# Patient Record
Sex: FEMALE | Race: WHITE | Employment: UNEMPLOYED | URBAN - METROPOLITAN AREA
[De-identification: names, ages, dates, MRNs, and addresses within clinical notes are randomized per-mention and may not be internally consistent; named-entity substitution may affect disease eponyms.]

---

## 2017-03-03 ENCOUNTER — OFFICE VISIT (OUTPATIENT)
Dept: SURGERY | Age: 47
End: 2017-03-03

## 2017-03-03 VITALS
HEIGHT: 58 IN | HEART RATE: 77 BPM | SYSTOLIC BLOOD PRESSURE: 130 MMHG | WEIGHT: 93 LBS | DIASTOLIC BLOOD PRESSURE: 72 MMHG | BODY MASS INDEX: 19.52 KG/M2

## 2017-03-03 DIAGNOSIS — N64.52 BLOODY DISCHARGE FROM LEFT NIPPLE: Primary | ICD-10-CM

## 2017-03-03 DIAGNOSIS — C50.412 MALIGNANT NEOPLASM OF UPPER-OUTER QUADRANT OF LEFT FEMALE BREAST (HCC): ICD-10-CM

## 2017-03-03 NOTE — MR AVS SNAPSHOT
Visit Information Date & Time Provider Department Dept. Phone Encounter #  
 3/3/2017 09:81 AM Ariadna Valdez MD [address] at [address] [place] [address] [id number] Follow-up Instructions Return in about 3 months (around 6/3/2017). Follow-up and Disposition History Your Appointments 6/2/2017  8:30 AM  
Follow Up with MD [address] at [facility] Appt Note: 3 month follow up/ cp$ 3-3-17 LS  
 [address] [facility] [address]  
  
   
 [address] Upcoming Health Maintenance Date Due Pneumococcal 19-64 Highest Risk (1 of 3 - PCV13) 7/9/1991 DTaP/Tdap/Td series (1 - Tdap) 7/9/1993 PAP AKA CERVICAL CYTOLOGY 7/9/1993 INFLUENZA AGE 9 TO ADULT 8/1/2016 Allergies as of 3/3/2017  Review Complete On: 0/4/3675 By: Ariadna Valdez MD  
 No Known Allergies Current Immunizations  Never Reviewed No immunizations on file. Not reviewed this visit You Were Diagnosed With   
  
 Codes Comments Bloody discharge from left nipple    -  Primary ICD-10-CM: N64.52 
ICD-9-CM: 611.79 Malignant neoplasm of upper-outer quadrant of left female breast ([facility] [address]     ICD-10-CM: N96.467 ICD-9-CM: 174.4 Vitals BP  
  
  
  
  
  
 130/72 (BP 1 Location: Right arm, BP Patient Position: Sitting) BMI and BSA Data Body Mass Index Body Surface Area  
 19.44 kg/m 2 1.31 m 2 Preferred Pharmacy Pharmacy Name Phone [id number] [address] [phone] Your Updated Medication List  
  
   
This list is accurate as of: 3/3/17  1:20 PM.  Always use your most recent med list.  
  
  
  
  
 acetaminophen 325 mg tablet Commonly known as:  TYLENOL Take  by mouth every four (4) hours as needed for Pain. hydrALAZINE 100 mg tablet Commonly known as:  APRESOLINE Take 100 mg by mouth. LUTEIN PO Take  by mouth. omega-3 fatty acids-vitamin e 1,000 mg Cap Take 1 Cap by mouth. PROBIOTIC 4X 10-15 mg Tbec Generic drug:  B.infantis-B.ani-B.long-B.bifi Take  by mouth. Follow-up Instructions Return in about 3 months (around 6/3/2017). Please provide this summary of care documentation to your next provider. Your primary care clinician is listed as Balwinder Banegas. If you have any questions after today's visit, please call [phone].

## 2017-03-03 NOTE — COMMUNICATION BODY
HISTORY OF PRESENT ILLNESS  Dagoberto Ramos is a 42 y.o. female. HPI  ESTABLISHED patient LEFT breast mastectomy 12/15/16. She complains of LEFT breast bloody nipple discharge since 3/2/17. Patient states the breast feels more \"tight\" than usual.     09/03/2015: LEFT lumpectomy of 0.3 cm, gr1-2 IDC with gr2 DCIS. ER+95%/MS+30% Her2- (DCIS: ER+95%/MS+5%). Positive margins. pT1a pNx Mx     10/07/2015: LEFT core bx of gr2 IDC with gr2-3 DCIS. ER+100%/MS+50% Her2- (DCIS: ER+100%/MS+20%)     Mammaprint: High risk luminal B     12/01/2015: LEFT SNBx with nipple bx. 0/6 LN involved. Benign breast tissue.     12/15/2015: LEFT mastectomy for 0.4 cm, gr2 IDC with gr3 DCIS. Clear margins. Pt had right breast implant also.     BRCA 2 gene NBN +     Pt refused Tamoxifen. Has yet to f/u with a medical oncologist since last visit with Dr. Lara Ribera on 01/04/2016    Past Medical History:   Diagnosis Date    Breast cancer ([facility] [address]     LT    Cancer ([facility] [address] 9/2015    LEFT breast        Past Surgical History:   Procedure Laterality Date    HX BREAST LUMPECTOMY Left 9/3/2015    LEFT BREAST DUCTAL EXCISON performed by Mirna Solitario MD at [address] HX HEENT Left 1998 x 3    Retinal detachment    HX MASTECTOMY Bilateral 12/15/2015    LEFT BREAST TOTAL SKIN AND NIPPLE SPARING MASTECTOMY, INSERTION LEFT BREAST TISSUE EXPANDER WITH ALLOGRAFT PLACEMENT, INSERTION RIGHT BREAST IMPLANT performed by Mirna Solitario MD at [facility]       Social History     Social History    Marital status:      Spouse name: N/A    Number of children: N/A    Years of education: N/A     Occupational History    Not on file.      Social History Main Topics    Smoking status: Never Smoker    Smokeless tobacco: Never Used    Alcohol use 1.2 oz/week     2 Glasses of wine per week    Drug use: Not on file    Sexual activity: Not on file     Other Topics Concern    Not on file     Social History Narrative       Current Outpatient Prescriptions on File Prior to Visit   Medication Sig Dispense Refill    B.infantis-B.ani-B.long-B.bifi (PROBIOTIC 4X) 10-15 mg TbEC Take  by mouth.  LUTEIN PO Take  by mouth.  acetaminophen (TYLENOL) 325 mg tablet Take  by mouth every four (4) hours as needed for Pain.  omega-3 fatty acids-vitamin e 1,000 mg cap Take 1 Cap by mouth.  hydrALAZINE (APRESOLINE) 100 mg tablet Take 100 mg by mouth. No current facility-administered medications on file prior to visit. No Known Allergies    OB History     Obstetric Comments    Menarche:  8. LMP: 06/02/2015. # of Children:  0. Age at Delivery of First Child:  n/a. Hysterectomy/oophorectomy:  NO/NO. Breast Bx:  no.  Hx of Breast Feeding:  no. BCP:  yes. Hormone therapy:  no.             ROS  Constitutional: Negative    HENT: Negative. Eyes: Negative. Respiratory: Negative. Cardiovascular: Negative. Gastrointestinal: Negative. Genitourinary: Negative. Musculoskeletal: Negative. Skin: Negative. Neurological: Negative. Endo/Heme/Allergies: Negative. Psychiatric/Behavioral: Negative. Physical Exam   Cardiovascular: Normal rate and normal heart sounds. Pulmonary/Chest: Breath sounds normal. Right breast exhibits no inverted nipple, no mass, no nipple discharge, no skin change and no tenderness. Left breast exhibits nipple discharge. Left breast exhibits no inverted nipple, no mass, no skin change and no tenderness. Breasts are symmetrical.       Lymphadenopathy:        Right cervical: No superficial cervical, no deep cervical and no posterior cervical adenopathy present. Left cervical: No superficial cervical, no deep cervical and no posterior cervical adenopathy present. Right axillary: No pectoral and no lateral adenopathy present. Left axillary: No pectoral and no lateral adenopathy present. BREAST ULTRASOUND  Indication: Left  breast discharge nipple  Technique:   The area was scanned using a high-frequency linear-array near-field transducer  Findings: Hypoechoic area lateral to nipple  Impression: Possible papilloma  Disposition: Discussed option including observation and excision. Pt elected for observation. ASSESSMENT and PLAN    ICD-10-CM ICD-9-CM    1. Bloody discharge from left nipple N64.52 611.79    2. Malignant neoplasm of upper-outer quadrant of left female breast ([facility] [address] C50.412 174.4       Pt presents with LT bloody nipple discharge, possibly related to intraductal papilloma or post-op changes. Last MRI looks good. Discussed options including observation and excisional biopsy. Pt elected for observation. F/u in 2-3 months. This plan was reviewed with the patient and patient agrees. All questions were answered.     Written by Wojciech Ward, as dictated by Dr. Love Temple MD.

## 2017-03-03 NOTE — PROGRESS NOTES
HISTORY OF PRESENT ILLNESS  Karla Chauhan is a 42 y.o. female. HPI   ESTABLISHED patient LEFT breast mastectomy 12/15/16. She complains of LEFT breast bloody nipple discharge since 3/2/17. Patient states the breast feels more \"tight\" than usual.   09/03/2015: LEFT lumpectomy of 0.3 cm, gr1-2 IDC with gr2 DCIS. ER+95%/MN+30% Her2- (DCIS: ER+95%/MN+5%). Positive margins. pT1a pNx Mx    10/07/2015: LEFT core bx of gr2 IDC with gr2-3 DCIS. ER+100%/MN+50% Her2- (DCIS: ER+100%/MN+20%)    Mammaprint: High risk luminal B    12/01/2015: LEFT SNBx with nipple bx. 0/6 LN involved. Benign breast tissue. 12/15/2015: LEFT mastectomy for 0.4 cm, gr2 IDC with gr3 DCIS. Clear margins. Pt had right breast implant also. BRCA 2 gene NBN +    Pt refused Tamoxifen.  Has yet to f/u with a medical oncologist since last visit with Dr. Tamara Rodas on 01/04/2016  ROS    Physical Exam    ASSESSMENT and PLAN  {ASSESSMENT/PLAN:[id number]}

## 2017-03-03 NOTE — PROGRESS NOTES
HISTORY OF PRESENT ILLNESS  Devaughn Servin is a 42 y.o. female. HPI  ESTABLISHED patient LEFT breast mastectomy 12/15/16. She complains of LEFT breast bloody nipple discharge since 3/2/17. Patient states the breast feels more \"tight\" than usual.     09/03/2015: LEFT lumpectomy of 0.3 cm, gr1-2 IDC with gr2 DCIS. ER+95%/OH+30% Her2- (DCIS: ER+95%/OH+5%). Positive margins. pT1a pNx Mx     10/07/2015: LEFT core bx of gr2 IDC with gr2-3 DCIS. ER+100%/OH+50% Her2- (DCIS: ER+100%/OH+20%)     Mammaprint: High risk luminal B     12/01/2015: LEFT SNBx with nipple bx. 0/6 LN involved. Benign breast tissue.     12/15/2015: LEFT mastectomy for 0.4 cm, gr2 IDC with gr3 DCIS. Clear margins. Pt had right breast implant also.     BRCA 2 gene NBN +     Pt refused Tamoxifen. Has yet to f/u with a medical oncologist since last visit with Dr. Sebastián Joseph on 01/04/2016    Past Medical History:   Diagnosis Date    Breast cancer ([facility] [address]     LT    Cancer ([facility] [address] 9/2015    LEFT breast        Past Surgical History:   Procedure Laterality Date    HX BREAST LUMPECTOMY Left 9/3/2015    LEFT BREAST DUCTAL EXCISON performed by Rogelio Matute MD at [address] [facility] HEENT Left 1998 x 3    Retinal detachment    HX MASTECTOMY Bilateral 12/15/2015    LEFT BREAST TOTAL SKIN AND NIPPLE SPARING MASTECTOMY, INSERTION LEFT BREAST TISSUE EXPANDER WITH ALLOGRAFT PLACEMENT, INSERTION RIGHT BREAST IMPLANT performed by Rogelio Matute MD at [address] [facility]       Social History     Social History    Marital status:      Spouse name: N/A    Number of children: N/A    Years of education: N/A     Occupational History    Not on file.      Social History Main Topics    Smoking status: Never Smoker    Smokeless tobacco: Never Used    Alcohol use 1.2 oz/week     2 Glasses of wine per week    Drug use: Not on file    Sexual activity: Not on file     Other Topics Concern    Not on file     Social History Narrative       Current Outpatient Prescriptions on File Prior to Visit   Medication Sig Dispense Refill    B.infantis-B.ani-B.long-B.bifi (PROBIOTIC 4X) 10-15 mg TbEC Take  by mouth.  LUTEIN PO Take  by mouth.  acetaminophen (TYLENOL) 325 mg tablet Take  by mouth every four (4) hours as needed for Pain.  omega-3 fatty acids-vitamin e 1,000 mg cap Take 1 Cap by mouth.  hydrALAZINE (APRESOLINE) 100 mg tablet Take 100 mg by mouth. No current facility-administered medications on file prior to visit. No Known Allergies    OB History     Obstetric Comments    Menarche:  8. LMP: 06/02/2015. # of Children:  0. Age at Delivery of First Child:  n/a. Hysterectomy/oophorectomy:  NO/NO. Breast Bx:  no.  Hx of Breast Feeding:  no. BCP:  yes. Hormone therapy:  no.             ROS  Constitutional: Negative    HENT: Negative. Eyes: Negative. Respiratory: Negative. Cardiovascular: Negative. Gastrointestinal: Negative. Genitourinary: Negative. Musculoskeletal: Negative. Skin: Negative. Neurological: Negative. Endo/Heme/Allergies: Negative. Psychiatric/Behavioral: Negative. Physical Exam   Cardiovascular: Normal rate and normal heart sounds. Pulmonary/Chest: Breath sounds normal. Right breast exhibits no inverted nipple, no mass, no nipple discharge, no skin change and no tenderness. Left breast exhibits nipple discharge. Left breast exhibits no inverted nipple, no mass, no skin change and no tenderness. Breasts are symmetrical.       Lymphadenopathy:        Right cervical: No superficial cervical, no deep cervical and no posterior cervical adenopathy present. Left cervical: No superficial cervical, no deep cervical and no posterior cervical adenopathy present. Right axillary: No pectoral and no lateral adenopathy present. Left axillary: No pectoral and no lateral adenopathy present. BREAST ULTRASOUND  Indication: Left  breast discharge nipple  Technique:   The area was scanned using a high-frequency linear-array near-field transducer  Findings: Hypoechoic area lateral to nipple  Impression: Possible papilloma  Disposition: Discussed option including observation and excision. Pt elected for observation. ASSESSMENT and PLAN    ICD-10-CM ICD-9-CM    1. Bloody discharge from left nipple N64.52 611.79    2. Malignant neoplasm of upper-outer quadrant of left female breast ([facility] [address] C50.412 174.4       Pt presents with LT bloody nipple discharge, possibly related to intraductal papilloma or post-op changes. Last MRI looks good. Discussed options including observation and excisional biopsy. Pt elected for observation. F/u in 2-3 months. This plan was reviewed with the patient and patient agrees. All questions were answered.     Written by Gurpreet Santoro, as dictated by Dr. Gerson Arredondo MD.

## 2017-06-09 ENCOUNTER — OFFICE VISIT (OUTPATIENT)
Dept: SURGERY | Age: 47
End: 2017-06-09

## 2017-06-09 VITALS
HEART RATE: 65 BPM | WEIGHT: 94 LBS | SYSTOLIC BLOOD PRESSURE: 123 MMHG | DIASTOLIC BLOOD PRESSURE: 61 MMHG | HEIGHT: 58 IN | BODY MASS INDEX: 19.73 KG/M2

## 2017-06-09 DIAGNOSIS — C50.412 MALIGNANT NEOPLASM OF UPPER-OUTER QUADRANT OF LEFT FEMALE BREAST (HCC): Primary | ICD-10-CM

## 2017-06-09 NOTE — PROGRESS NOTES
HISTORY OF PRESENT ILLNESS  Misha Mccracken is a 42 y.o. female. HPI  ESTABLISHED patient here for 3 month follow up for LEFT breast bloody nipple discharge. Patient has not noticed anymore discharge since last visit on 3/3/17. Not having any pain. Patient is having a procedure on June 30th for uterine polyps. Her doctor is recommending that she take progesterone, but the patient wants to talk to Dr. Salma Souza about this.     09/03/2015: LEFT lumpectomy of 0.3 cm, gr1-2 IDC with gr2 DCIS. ER+95%/UT+30% Her2- (DCIS: ER+95%/UT+5%). Positive margins. pT1a pNx Mx     10/07/2015: LEFT core bx of gr2 IDC with gr2-3 DCIS. ER+100%/UT+50% Her2- (DCIS: ER+100%/UT+20%)     Mammaprint: High risk luminal B     12/01/2015: LEFT SNBx with nipple bx. 0/6 LN involved. Benign breast tissue.     12/15/2015: LEFT mastectomy for 0.4 cm, gr2 IDC with gr3 DCIS. Clear margins. Pt had right breast implant also.     BRCA 2 gene NBN +     Pt refused Tamoxifen. Has yet to f/u with a medical oncologist since last visit with Dr. Sergio Feliz on 01/04/2016    Past Medical History:   Diagnosis Date    Breast cancer ([facility] [address]     LT    Cancer ([facility] [address] 9/2015    LEFT breast        Past Surgical History:   Procedure Laterality Date    HX BREAST LUMPECTOMY Left 9/3/2015    LEFT BREAST DUCTAL EXCISON performed by Ashia Gutierrez MD at [address] HX HEENT Left 1998 x 3    Retinal detachment    HX MASTECTOMY Bilateral 12/15/2015    LEFT BREAST TOTAL SKIN AND NIPPLE SPARING MASTECTOMY, INSERTION LEFT BREAST TISSUE EXPANDER WITH ALLOGRAFT PLACEMENT, INSERTION RIGHT BREAST IMPLANT performed by Ashia Gutierrez MD at [address] [facility]       Social History     Social History    Marital status:      Spouse name: N/A    Number of children: N/A    Years of education: N/A     Occupational History    Not on file.      Social History Main Topics    Smoking status: Never Smoker    Smokeless tobacco: Never Used    Alcohol use 1.2 oz/week     2 Glasses of wine per week    Drug use: Not on file    Sexual activity: Not on file     Other Topics Concern    Not on file     Social History Narrative       Current Outpatient Prescriptions on File Prior to Visit   Medication Sig Dispense Refill    B.infantis-B.ani-B.long-B.bifi (PROBIOTIC 4X) 10-15 mg TbEC Take  by mouth.  LUTEIN PO Take  by mouth.  acetaminophen (TYLENOL) 325 mg tablet Take  by mouth every four (4) hours as needed for Pain.  omega-3 fatty acids-vitamin e 1,000 mg cap Take 1 Cap by mouth. No current facility-administered medications on file prior to visit. No Known Allergies    OB History     Obstetric Comments    Menarche:  8. LMP: 06/02/2015. # of Children:  0. Age at Delivery of First Child:  n/a. Hysterectomy/oophorectomy:  NO/NO. Breast Bx:  no.  Hx of Breast Feeding:  no. BCP:  yes. Hormone therapy:  no.             ROS  Constitutional: Negative    HENT: Negative. Eyes: Negative. Respiratory: Negative. Cardiovascular: Negative. Gastrointestinal: Negative. Genitourinary: Negative. Musculoskeletal: Negative. Skin: Negative. Neurological: Negative. Endo/Heme/Allergies: Negative. Psychiatric/Behavioral: Negative. Physical Exam   Cardiovascular: Normal rate and normal heart sounds. Pulmonary/Chest: Breath sounds normal. Right breast exhibits no inverted nipple, no mass, no nipple discharge, no skin change and no tenderness. Left breast exhibits no inverted nipple, no mass, no nipple discharge, no skin change and no tenderness. Breasts are symmetrical.       Lymphadenopathy:        Right cervical: No superficial cervical, no deep cervical and no posterior cervical adenopathy present. Left cervical: No superficial cervical, no deep cervical and no posterior cervical adenopathy present. Right axillary: No pectoral and no lateral adenopathy present.         Left axillary: No pectoral and no lateral adenopathy present. ASSESSMENT and PLAN    ICD-10-CM ICD-9-CM    1. Malignant neoplasm of upper-outer quadrant of left female breast ([facility]) C50.412 174.4      Pt here to f/u on LT nipple discharge and doing well. Pt states discharge had resolved 1 week following last exam.     Discussed progesterone therapy she was recommended for recently dx uterine polyps. Advised that short term hormone therapy shouldn't increase risk of BC recurrence significantly, while long-term progesterone administration could be worrisome. F/u in 1 year. This plan was reviewed with the patient and patient agrees. All questions were answered.     Written by Elida Hernández, as dictated by Dr. Joe Suero MD.

## 2017-06-09 NOTE — PATIENT INSTRUCTIONS
Breast Self-Exam: Care Instructions  Your Care Instructions  A breast self-exam is when you check your breasts for lumps or changes. This regular exam helps you learn how your breasts normally look and feel. Most breast problems or changes are not because of cancer. Breast self-exam is not a substitute for a mammogram. Having regular breast exams by your doctor and regular mammograms improve your chances of finding any problems with your breasts. Some women set a time each month to do a step-by-step breast self-exam. Other women like a less formal system. They might look at their breasts as they brush their teeth, or feel their breasts once in a while in the shower. If you notice a change in your breast, tell your doctor. Follow-up care is a key part of your treatment and safety. Be sure to make and go to all appointments, and call your doctor if you are having problems. Its also a good idea to know your test results and keep a list of the medicines you take. How do you do a breast self-exam?  · The best time to examine your breasts is usually one week after your menstrual period begins. Your breasts should not be tender then. If you do not have periods, you might do your exam on a day of the month that is easy to remember. · To examine your breasts:  ¨ Remove all your clothes above the waist and lie down. When you are lying down, your breast tissue spreads evenly over your chest wall, which makes it easier to feel all your breast tissue. ¨ Use the pads--not the fingertips--of the 3 middle fingers of your left hand to check your right breast. Move your fingers slowly in small coin-sized circles that overlap. ¨ Use three levels of pressure to feel of all your breast tissue. Use light pressure to feel the tissue close to the skin surface. Use medium pressure to feel a little deeper. Use firm pressure to feel your tissue close to your breastbone and ribs.  Use each pressure level to feel your breast tissue before moving on to the next spot. ¨ Check your entire breast, moving up and down as if following a strip from the collarbone to the bra line, and from the armpit to the ribs. Repeat until you have covered the entire breast.  ¨ Repeat this procedure for your left breast, using the pads of the 3 middle fingers of your right hand. · To examine your breasts while in the shower:  ¨ Place one arm over your head and lightly soap your breast on that side. ¨ Using the pads of your fingers, gently move your hand over your breast (in the strip pattern described above), feeling carefully for any lumps or changes. ¨ Repeat for the other breast.  · Have your doctor inspect anything you notice to see if you need further testing. Where can you learn more? Go to http://rafaela-micah.info/. Enter P148 in the search box to learn more about \"Breast Self-Exam: Care Instructions. \"  Current as of: July 26, 2016  Content Version: 11.2  © 7603-5399 [employer]. Care instructions adapted under license by [employer] (which disclaims liability or warranty for this information). If you have questions about a medical condition or this instruction, always ask your healthcare professional. [place] [address] any warranty or liability for your use of this information.

## 2017-06-09 NOTE — MR AVS SNAPSHOT
Visit Information Date & Time Provider Department Dept. Phone Encounter #  
 6/9/2017  2:10 AM Alexa Badillo MD [address] at [address] [place] [address] [id number] Your Appointments 6/15/2018  8:45 AM  
Follow Up with MD [address] at [facility] Appt Note: 1 year follow up Cp$0 6/9/17 tt  
 [address]  
  
   
 [address] Upcoming Health Maintenance Date Due Pneumococcal 19-64 Highest Risk (1 of 3 - PCV13) 7/9/1991 DTaP/Tdap/Td series (1 - Tdap) 7/9/1993 PAP AKA CERVICAL CYTOLOGY 7/9/1993 INFLUENZA AGE 9 TO ADULT 8/1/2017 Allergies as of 6/9/2017  Review Complete On: 6/9/2017 By: Eliana Ricketts RN No Known Allergies Current Immunizations  Never Reviewed No immunizations on file. Not reviewed this visit You Were Diagnosed With   
  
 Codes Comments Malignant neoplasm of upper-outer quadrant of left female breast ([facility]    -  Primary ICD-10-CM: K39.361 ICD-9-CM: 174.4 Vitals BP Pulse Height(growth percentile) Weight(growth percentile) BMI OB Status 123/61 65 4' 10\" (1.473 m) 94 lb (42.6 kg) 19.65 kg/m2 Having regular periods Smoking Status Never Smoker BMI and BSA Data Body Mass Index Body Surface Area 19.65 kg/m 2 1.32 m 2 Preferred Pharmacy Pharmacy Name Phone [id number] [phone] Your Updated Medication List  
  
   
This list is accurate as of: 6/9/17  9:56 AM.  Always use your most recent med list.  
  
  
  
  
 acetaminophen 325 mg tablet Commonly known as:  TYLENOL Take  by mouth every four (4) hours as needed for Pain. LUTEIN PO Take  by mouth. omega-3 fatty acids-vitamin e 1,000 mg Cap Take 1 Cap by mouth. PROBIOTIC 4X 10-15 mg Tbec Generic drug:  B.infantis-B.ani-B.long-B.bifi Take  by mouth. Patient Instructions Breast Self-Exam: Care Instructions Your Care Instructions A breast self-exam is when you check your breasts for lumps or changes. This regular exam helps you learn how your breasts normally look and feel. Most breast problems or changes are not because of cancer. Breast self-exam is not a substitute for a mammogram. Having regular breast exams by your doctor and regular mammograms improve your chances of finding any problems with your breasts. Some women set a time each month to do a step-by-step breast self-exam. Other women like a less formal system. They might look at their breasts as they brush their teeth, or feel their breasts once in a while in the shower. If you notice a change in your breast, tell your doctor. Follow-up care is a key part of your treatment and safety. Be sure to make and go to all appointments, and call your doctor if you are having problems. Its also a good idea to know your test results and keep a list of the medicines you take. How do you do a breast self-exam? 
· The best time to examine your breasts is usually one week after your menstrual period begins. Your breasts should not be tender then. If you do not have periods, you might do your exam on a day of the month that is easy to remember. · To examine your breasts: ¨ Remove all your clothes above the waist and lie down. When you are lying down, your breast tissue spreads evenly over your chest wall, which makes it easier to feel all your breast tissue. ¨ Use the padsnot the fingertipsof the 3 middle fingers of your left hand to check your right breast. Move your fingers slowly in small coin-sized circles that overlap. ¨ Use three levels of pressure to feel of all your breast tissue. Use light pressure to feel the tissue close to the skin surface.  Use medium pressure to feel a little deeper. Use firm pressure to feel your tissue close to your breastbone and ribs. Use each pressure level to feel your breast tissue before moving on to the next spot. ¨ Check your entire breast, moving up and down as if following a strip from the collarbone to the bra line, and from the armpit to the ribs. Repeat until you have covered the entire breast. 
¨ Repeat this procedure for your left breast, using the pads of the 3 middle fingers of your right hand. · To examine your breasts while in the shower: 
¨ Place one arm over your head and lightly soap your breast on that side. ¨ Using the pads of your fingers, gently move your hand over your breast (in the strip pattern described above), feeling carefully for any lumps or changes. ¨ Repeat for the other breast. 
· Have your doctor inspect anything you notice to see if you need further testing. Where can you learn more? Go to http://rafaela-micah.info/. Enter P148 in the search box to learn more about \"Breast Self-Exam: Care Instructions. \" Current as of: July 26, 2016 Content Version: 11.2 © 0476-5301 [employer]. Care instructions adapted under license by [employer] (which disclaims liability or warranty for this information). If you have questions about a medical condition or this instruction, always ask your healthcare professional. [address] any warranty or liability for your use of this information. Please provide this summary of care documentation to your next provider. Your primary care clinician is listed as Sincere Waggoner. If you have any questions after today's visit, please call [phone].

## 2017-06-09 NOTE — PROGRESS NOTES
HISTORY OF PRESENT ILLNESS  Sania Avelar is a 42 y.o. female. HPI ESTABLISHED patient here for 3 month follow up for LEFT breast bloody nipple discharge. Patient has not noticed anymore discharge since last visit on 3/3/17. Not having any pain. Patient is having a procedure on June 30th for uterine polyps. Her doctor is recommending that she take progesterone, but the patient wants to talk to Dr. Gila De La Rosa about this. 09/03/2015: LEFT lumpectomy of 0.3 cm, gr1-2 IDC with gr2 DCIS. ER+95%/IA+30% Her2- (DCIS: ER+95%/IA+5%). Positive margins. pT1a pNx Mx    10/07/2015: LEFT core bx of gr2 IDC with gr2-3 DCIS. ER+100%/IA+50% Her2- (DCIS: ER+100%/IA+20%)    Mammaprint: High risk luminal B    12/01/2015: LEFT SNBx with nipple bx. 0/6 LN involved. Benign breast tissue. 12/15/2015: LEFT mastectomy for 0.4 cm, gr2 IDC with gr3 DCIS. Clear margins. Pt had right breast implant also. BRCA 2 gene NBN +    Pt refused Tamoxifen.  Has yet to f/u with a medical oncologist since last visit with Dr. Evita Erwin on 01/04/2016    [facility]    Physical Exam    ASSESSMENT and PLAN  {ASSESSMENT/PLAN:67678}

## 2017-06-09 NOTE — COMMUNICATION BODY
HISTORY OF PRESENT ILLNESS  [place] is a 42 y.o. female. HPI  ESTABLISHED patient here for 3 month follow up for LEFT breast bloody nipple discharge. Patient has not noticed anymore discharge since last visit on 3/3/17. Not having any pain. Patient is having a procedure on June 30th for uterine polyps. Her doctor is recommending that she take progesterone, but the patient wants to talk to Dr. Thierry Be about this.     09/03/2015: LEFT lumpectomy of 0.3 cm, gr1-2 IDC with gr2 DCIS. ER+95%/WA+30% Her2- (DCIS: ER+95%/WA+5%). Positive margins. pT1a pNx Mx     10/07/2015: LEFT core bx of gr2 IDC with gr2-3 DCIS. ER+100%/WA+50% Her2- (DCIS: ER+100%/WA+20%)     Mammaprint: High risk luminal B     12/01/2015: LEFT SNBx with nipple bx. 0/6 LN involved. Benign breast tissue.     12/15/2015: LEFT mastectomy for 0.4 cm, gr2 IDC with gr3 DCIS. Clear margins. Pt had right breast implant also.     BRCA 2 gene NBN +     Pt refused Tamoxifen. Has yet to f/u with a medical oncologist since last visit with Dr. Marcelino Carrera on 01/04/2016    Past Medical History:   Diagnosis Date    [facility] ([facility]     LT    Cancer ([facility] 9/2015    LEFT breast        Past Surgical History:   Procedure Laterality Date    HX BREAST LUMPECTOMY Left 9/3/2015    LEFT BREAST DUCTAL EXCISON performed by Micheal Shaffer MD at [address] HX HEENT Left 1998 x 3    Retinal detachment    HX MASTECTOMY Bilateral 12/15/2015    LEFT BREAST TOTAL SKIN AND NIPPLE SPARING MASTECTOMY, INSERTION LEFT BREAST TISSUE EXPANDER WITH ALLOGRAFT PLACEMENT, INSERTION RIGHT BREAST IMPLANT performed by Micheal Shaffer MD at [facility]       Social History     Social History    Marital status:      Spouse name: N/A    Number of children: N/A    Years of education: N/A     Occupational History    Not on file.      Social History Main Topics    Smoking status: Never Smoker    Smokeless tobacco: Never Used    Alcohol use 1.2 oz/week     2 Glasses of wine per week    Drug use: Not on file    Sexual activity: Not on file     Other Topics Concern    Not on file     Social History Narrative       Current Outpatient Prescriptions on File Prior to Visit   Medication Sig Dispense Refill    B.infantis-B.ani-B.long-B.bifi (PROBIOTIC 4X) 10-15 mg TbEC Take  by mouth.  LUTEIN PO Take  by mouth.  acetaminophen (TYLENOL) 325 mg tablet Take  by mouth every four (4) hours as needed for Pain.  omega-3 fatty acids-vitamin e 1,000 mg cap Take 1 Cap by mouth. No current facility-administered medications on file prior to visit. No Known Allergies    OB History     Obstetric Comments    Menarche:  8. LMP: 06/02/2015. # of Children:  0. Age at Delivery of First Child:  n/a. Hysterectomy/oophorectomy:  NO/NO. Breast Bx:  no.  Hx of Breast Feeding:  no. BCP:  yes. Hormone therapy:  no.             ROS  Constitutional: Negative    HENT: Negative. Eyes: Negative. Respiratory: Negative. Cardiovascular: Negative. Gastrointestinal: Negative. Genitourinary: Negative. Musculoskeletal: Negative. Skin: Negative. Neurological: Negative. Endo/Heme/Allergies: Negative. Psychiatric/Behavioral: Negative. Physical Exam   Cardiovascular: Normal rate and normal heart sounds. Pulmonary/Chest: Breath sounds normal. Right breast exhibits no inverted nipple, no mass, no nipple discharge, no skin change and no tenderness. Left breast exhibits no inverted nipple, no mass, no nipple discharge, no skin change and no tenderness. Breasts are symmetrical.       Lymphadenopathy:        Right cervical: No superficial cervical, no deep cervical and no posterior cervical adenopathy present. Left cervical: No superficial cervical, no deep cervical and no posterior cervical adenopathy present. Right axillary: No pectoral and no lateral adenopathy present.         Left axillary: No pectoral and no lateral adenopathy present. ASSESSMENT and PLAN    ICD-10-CM ICD-9-CM    1. Malignant neoplasm of upper-outer quadrant of left female breast (HCC) C50.412 174.4      Pt here to f/u on LT nipple discharge and doing well. Pt states discharge had resolved 1 week following last exam.     Discussed progesterone therapy she was recommended for recently dx uterine polyps. Advised that short term hormone therapy shouldn't increase risk of BC recurrence significantly, while long-term progesterone administration could be worrisome. F/u in 1 year. This plan was reviewed with the patient and patient agrees. All questions were answered.     Written by Pepper Hwang, as dictated by Dr. Iam Padron MD.

## 2018-11-16 ENCOUNTER — OFFICE VISIT (OUTPATIENT)
Dept: SURGERY | Age: 48
End: 2018-11-16

## 2018-11-16 VITALS
DIASTOLIC BLOOD PRESSURE: 76 MMHG | SYSTOLIC BLOOD PRESSURE: 126 MMHG | WEIGHT: 95.2 LBS | BODY MASS INDEX: 19.98 KG/M2 | HEART RATE: 72 BPM | HEIGHT: 58 IN

## 2018-11-16 DIAGNOSIS — Z91.89 AT HIGH RISK FOR BREAST CANCER: Primary | ICD-10-CM

## 2018-11-16 DIAGNOSIS — C50.412 MALIGNANT NEOPLASM OF UPPER-OUTER QUADRANT OF LEFT BREAST IN FEMALE, ESTROGEN RECEPTOR POSITIVE (HCC): ICD-10-CM

## 2018-11-16 DIAGNOSIS — Z17.0 MALIGNANT NEOPLASM OF UPPER-OUTER QUADRANT OF LEFT BREAST IN FEMALE, ESTROGEN RECEPTOR POSITIVE (HCC): ICD-10-CM

## 2018-11-16 NOTE — PROGRESS NOTES
HISTORY OF PRESENT ILLNESS  Tabitha Polanco is a 57 y.o. female. HPI  ESTABLISHED patient here for yearly follow up of LEFT breast cancer. Denies any new palpable lumps, breast changes, skin changes, nipple drainage or inversion. She reports getting MRI's every other year instead of mammograms and is due for this. She has chosen not to take Tamoxifen or other meds and has not followed up with a med onc. She is here with her .     Breast history:  2015: LEFT lumpectomy of 0.3 cm, gr1-2 IDC with gr2 DCIS. ER+95%/CA+30% Her2- (DCIS: ER+95%/CA+5%). Positive margins. pT1a pNx Mx     10/07/2015: LEFT core bx of gr2 IDC with gr2-3 DCIS. ER+100%/CA+50% Her2- (DCIS: ER+100%/CA+20%)     Mammaprint: High risk luminal B     2015: LEFT SNBx with nipple bx. 0/6 LN involved. Benign breast tissue.     12/15/2015: LEFT mastectomy for 0.4 cm, gr2 IDC with gr3 DCIS. Clear margins. Pt had right breast implant also.     BRCA 2 gene NBN +     Pt refused Tamoxifen. Has yet to f/u with a medical oncologist since last visit with Dr. Jim Subramanian on 2016     Last imagin16-Right Breast:  1. BI-RADS Assessment Category 1: Negative. No evidence of breast carcinoma  within the right breast.  2. Intact submuscular implant, with imaging features consistent with saline.     Left Breast:  1. BI-RADS Assessment Category 2: Benign finding. Expected postsurgical changes  of left breast nipple sparing mastectomy with submuscular implant  reconstruction. 2. Intact submuscular implant, with imaging features consistent with silicone. 3. No evidence of breast carcinoma within the left breast. No MR correlate to  the physical exam finding of a left breast mass.     Past Medical History:   Diagnosis Date    Breast cancer ([address]     LT    Cancer ([facility] 2015    LEFT breast        Past Surgical History:   Procedure Laterality Date    HX HEENT Left 1998 x 3    Retinal detachment       Social History     Socioeconomic History    Marital status:      Spouse name: Not on file    Number of children: Not on file    Years of education: Not on file    Highest education level: Not on file   Social Needs    Financial resource strain: Not on file    Food insecurity - worry: Not on file    Food insecurity - inability: Not on file    Transportation needs - medical: Not on file   [employer] needs - non-medical: Not on file   Occupational History    Not on file   Tobacco Use    Smoking status: Never Smoker    Smokeless tobacco: Never Used   Substance and Sexual Activity    Alcohol use: Yes     Alcohol/week: 1.2 oz     Types: 2 Glasses of wine per week    Drug use: No    Sexual activity: Not on file   Other Topics Concern    Not on file   Social History Narrative    Not on file       Current Outpatient Medications on File Prior to Visit   Medication Sig Dispense Refill    TURMERIC PO Take 750 mg by mouth daily.  DIINDOLYLMETHANE, BULK, Take 100 mg by mouth daily.  LUTEIN PO Take 20 mg by mouth daily.  acetaminophen (TYLENOL) 325 mg tablet Take  by mouth every four (4) hours as needed for Pain.      B.infantis-B.ani-B.long-B.bifi (PROBIOTIC 4X) 10-15 mg TbEC Take  by mouth.  omega-3 fatty acids-vitamin e 1,000 mg cap Take 1 Cap by mouth. No current facility-administered medications on file prior to visit. No Known Allergies    OB History     Obstetric Comments    Menarche:  8. LMP: 06/02/2015. # of Children:  0. Age at Delivery of First Child:  n/a. Hysterectomy/oophorectomy:  NO/NO. Breast Bx:  no.  Hx of Breast Feeding:  no. BCP:  yes. Hormone therapy:  no.           ROS    Physical Exam   Cardiovascular: Normal rate and normal heart sounds. Pulmonary/Chest: Breath sounds normal. Right breast exhibits no inverted nipple, no mass, no nipple discharge, no skin change and no tenderness.  Left breast exhibits no inverted nipple, no mass, no nipple discharge, no skin change and no tenderness. Breasts are symmetrical.       Lymphadenopathy:        Right cervical: No superficial cervical, no deep cervical and no posterior cervical adenopathy present. Left cervical: No superficial cervical, no deep cervical and no posterior cervical adenopathy present. Right axillary: No pectoral and no lateral adenopathy present. Left axillary: No pectoral and no lateral adenopathy present. ASSESSMENT and PLAN    ICD-10-CM ICD-9-CM    1. At high risk for breast cancer Z91.89 V49.89 MRI BREAST BI W WO CONT   2. Malignant neoplasm of upper-outer quadrant of left breast in female, estrogen receptor positive ([facility] C50.412 174.4     Z17.0 V86.0       Patient presents to f/u on LEFT breast cancer, and is doing well overall. Physical exam today normal, well healed s/p LEFT skin and nipple sparing mastectomy with implant reconstruction, and RIGHT implant placement, no evidence of recurrence. No US today, as pt will have breast MRI in December. Pt notes recurring cervical polyps, and has procedure scheduled for this for December. Pt reports low progesterone levels, and wonders if she can safely take a pill to correct this. Because her breast cancer was ER+, she should not have prolonged estrogen, but can have short-term hormone therapy with estrogen or progesterone. Short-term course of therapy with pills is preferable to a long-term option such as IUD. Will order for MRI for December. F/U in 1 year. This plan was reviewed with the patient and patient agrees. All questions were answered.     Written by Dario Jacobsen, as dictated by Dr. Eva Mack MD.

## 2018-11-16 NOTE — PATIENT INSTRUCTIONS

## 2018-11-16 NOTE — PROGRESS NOTES
HISTORY OF PRESENT ILLNESS Eamon Lyon is a 57 y.o. female. HPI  ESTABLISHED patient here for yearly follow up of LEFT breast cancer. Denies any new palpable lumps, breast changes, skin changes, nipple drainage or inversion. She reports getting MRI's every other year instead of mammograms and is due for this. She has chosen not to take Tamoxifen or other meds and has not followed up with a med onc. She is here with her . Breast history: 
2015: LEFT lumpectomy of 0.3 cm, gr1-2 IDC with gr2 DCIS. ER+95%/[place]+30% Her2- (DCIS: ER+95%/WV+5%). Positive margins. pT1a pNx Mx 
 
10/07/2015: LEFT core bx of gr2 IDC with gr2-3 DCIS. ER+100%/WV+50% Her2- (DCIS: ER+100%/WV+20%) Mammaprint: High risk luminal B 
 
2015: LEFT SNBx with nipple bx. 0/6 LN involved. Benign breast tissue. 12/15/2015: LEFT mastectomy for 0.4 cm, gr2 IDC with gr3 DCIS. Clear margins. Pt had right breast implant also. BRCA 2 gene NBN + Pt refused Tamoxifen. Has yet to f/u with a medical oncologist since last visit with Dr. Jonah Smith on 2016 Last imagin16-Right Breast: 1. BI-RADS Assessment Category 1: Negative. No evidence of breast carcinoma 
within the right breast. 
2. Intact submuscular implant, with imaging features consistent with saline. 
  
Left Breast: 1. BI-RADS Assessment Category 2: Benign finding. Expected postsurgical changes 
of left breast nipple sparing mastectomy with submuscular implant 
reconstruction. 2. Intact submuscular implant, with imaging features consistent with silicone. 3. No evidence of breast carcinoma within the left breast. No MR correlate to 
the physical exam finding of a left breast mass. 
  
 
[facility] Physical Exam 
 
ASSESSMENT and PLAN 
{ASSESSMENT/PLAN:[id number]}

## 2018-11-30 ENCOUNTER — HOSPITAL ENCOUNTER (OUTPATIENT)
Dept: MRI IMAGING | Age: 48
Discharge: HOME | End: 2018-11-30
Attending: SURGERY
Payer: COMMERCIAL

## 2018-11-30 DIAGNOSIS — Z91.89 AT HIGH RISK FOR BREAST CANCER: ICD-10-CM

## 2018-11-30 PROCEDURE — A9585 GADOBUTROL INJECTION: HCPCS | Performed by: RADIOLOGY

## 2018-11-30 PROCEDURE — 74011250636 HC RX REV CODE- 250/636: Performed by: RADIOLOGY

## 2018-11-30 PROCEDURE — 77059 MRI BREAST BI W WO CONT: CPT

## 2018-11-30 RX ADMIN — GADOBUTROL 5 ML: 604.72 INJECTION INTRAVENOUS at 14:00

## 2018-12-05 ENCOUNTER — TELEPHONE (OUTPATIENT)
Dept: SURGERY | Age: 48
End: 2018-12-05

## 2018-12-05 DIAGNOSIS — R92.8 ABNORMAL MRI, BREAST: Primary | ICD-10-CM

## 2018-12-07 ENCOUNTER — DOCUMENTATION ONLY (OUTPATIENT)
Dept: SURGERY | Age: 48
End: 2018-12-07

## 2018-12-12 DIAGNOSIS — R92.8 ABNORMAL MRI, BREAST: Primary | ICD-10-CM

## 2018-12-20 ENCOUNTER — HOSPITAL ENCOUNTER (OUTPATIENT)
Dept: MAMMOGRAPHY | Age: 48
Discharge: HOME | End: 2018-12-20
Attending: SURGERY
Payer: COMMERCIAL

## 2018-12-20 ENCOUNTER — APPOINTMENT (OUTPATIENT)
Dept: MAMMOGRAPHY | Age: 48
End: 2018-12-20
Payer: COMMERCIAL

## 2018-12-20 DIAGNOSIS — R92.8 ABNORMAL MAMMOGRAM: ICD-10-CM

## 2018-12-20 DIAGNOSIS — R92.8 ABNORMAL MRI, BREAST: ICD-10-CM

## 2018-12-20 PROCEDURE — 76642 ULTRASOUND BREAST LIMITED: CPT

## 2019-12-06 ENCOUNTER — OFFICE VISIT (OUTPATIENT)
Dept: SURGERY | Age: 49
End: 2019-12-06

## 2019-12-06 VITALS
HEART RATE: 64 BPM | SYSTOLIC BLOOD PRESSURE: 141 MMHG | BODY MASS INDEX: 20.06 KG/M2 | DIASTOLIC BLOOD PRESSURE: 78 MMHG | WEIGHT: 96 LBS

## 2019-12-06 DIAGNOSIS — Z17.0 MALIGNANT NEOPLASM OF RIGHT BREAST, STAGE 1, ESTROGEN RECEPTOR POSITIVE (HCC): ICD-10-CM

## 2019-12-06 DIAGNOSIS — C50.911 MALIGNANT NEOPLASM OF RIGHT BREAST, STAGE 1, ESTROGEN RECEPTOR POSITIVE (HCC): ICD-10-CM

## 2019-12-06 NOTE — LETTER
December 6, 2019 [address] [address] [facility] [address] Dear Danya Resendez: 
 
Thank you for requesting access to [employer]. Please follow the instructions below to view your test results, access and download parts of your medical record, view details of your past and upcoming appointments, and view your medications online. How Do I Sign Up? 1. In your internet browser, go to [email or web address] 2. Click on the First Time User? Click Here link in the Sign In box. You will see the New Member Sign Up page. 3. Enter your [employer] Access Code exactly as it appears below. You will not need to use this code after youve completed the sign-up process. If you do not sign up before the expiration date, you must request a new code. · [employer] Access Code: [email or web address] · Expires: 1/20/2020  8:38 AM 
 
4. Enter the last four digits of your Social Security Number (xxxx) and Date of Birth (mm/dd/yyyy) as indicated and click Submit. You will be taken to the next sign-up page. 5. Create a [employer] ID. This will be your [employer] login ID and cannot be changed, so think of one that is secure and easy to remember. 6. Create a [employer] password. You can change your password at any time. 7. Enter your Password Reset Question and Answer. This can be used at a later time if you forget your password. 8. Enter your e-mail address. You will receive e-mail notification when new information is available in [address]. 9. Click Sign Up. You can now view and download portions of your medical record. 10. Click the Download Summary menu link to download a portable copy of your medical information. Additional Information: If you require any assistance or have any questions, please contact [facility] [address] at [phone], email at [email or web address] 
 
 Remember, MyChart is NOT to be used for urgent needs. For medical emergencies, dial 911. Now available from your iPhone and Android! Sincerely, Brain Madsen

## 2019-12-06 NOTE — COMMUNICATION BODY
HISTORY OF PRESENT ILLNESS  Marilyn Chamberlain is a 54 y.o. female. HPIESTABLISHED patient here for annual follow up LEFT breast cancer. She is doing well. Denies any breast lumps or skin changes. Denies pain. Patient states she will  no longer do mammograms.       09/03/2015: LEFT lumpectomy of 0.3 cm, gr1-2 IDC with gr2 DCIS. ER+95%/OK+30% Her2- (DCIS: ER+95%/OK+5%). Positive margins. pT1a pNx Mx     10/07/2015: LEFT core bx of gr2 IDC with gr2-3 DCIS. ER+100%/OK+50% Her2- (DCIS: ER+100%/OK+20%)     Mammaprint: High risk luminal B     12/01/2015: LEFT SNBx with nipple bx. 0/6 LN involved. Benign breast tissue.     12/15/2015: LEFT mastectomy for 0.4 cm, gr2 IDC with gr3 DCIS. Clear margins. Pt had right breast implant also.     BRCA 2 gene NBN +     Pt declined Tamoxifen.  Has yet to f/u with a medical oncologist since last visit with Dr. Dorian Reddy on 01/04/2016      Past Medical History:   Diagnosis Date    Breast cancer ([facility] [address]     LT    Cancer ([facility] [address] 9/2015    LEFT breast        Past Surgical History:   Procedure Laterality Date    HX BREAST LUMPECTOMY Left 9/3/2015    LEFT BREAST DUCTAL EXCISON performed by Kalpana Benavidez MD at [address] HX HEENT Left 1998 x 3    Retinal detachment    HX MASTECTOMY Bilateral 12/15/2015    LEFT BREAST TOTAL SKIN AND NIPPLE SPARING MASTECTOMY, INSERTION LEFT BREAST TISSUE EXPANDER WITH ALLOGRAFT PLACEMENT, INSERTION RIGHT BREAST IMPLANT performed by Kalpana Benavidez MD at [address] History     Socioeconomic History    Marital status:      Spouse name: Not on file    Number of children: Not on file    Years of education: Not on file    Highest education level: Not on file   Occupational History    Not on file   Social Needs    Financial resource strain: Not on file    Food insecurity:     Worry: Not on file     Inability: Not on file    Transportation needs:     Medical: Not on file     Non-medical: Not on file Tobacco Use    Smoking status: Never Smoker    Smokeless tobacco: Never Used   Substance and Sexual Activity    Alcohol use: Yes     Alcohol/week: 2.0 standard drinks     Types: 2 Glasses of wine per week    Drug use: No    Sexual activity: Not on file   Lifestyle    Physical activity:     Days per week: Not on file     Minutes per session: Not on file    Stress: Not on file   Relationships    Social connections:     Talks on phone: Not on file     Gets together: Not on file     Attends [employer] service: Not on file     Active member of club or organization: Not on file     Attends meetings of clubs or organizations: Not on file     Relationship status: Not on file    Intimate partner violence:     Fear of current or ex partner: Not on file     Emotionally abused: Not on file     Physically abused: Not on file     Forced sexual activity: Not on file   Other Topics Concern    Not on file   Social History Narrative    Not on file       Current Outpatient Medications on File Prior to Visit   Medication Sig Dispense Refill    TURMERIC PO Take 750 mg by mouth daily.  DIINDOLYLMETHANE, BULK, Take 100 mg by mouth daily.  B.infantis-B.ani-B.long-B.bifi (PROBIOTIC 4X) 10-15 mg TbEC Take  by mouth.  LUTEIN PO Take 20 mg by mouth daily.  omega-3 fatty acids-vitamin e 1,000 mg cap Take 1 Cap by mouth.  acetaminophen (TYLENOL) 325 mg tablet Take  by mouth every four (4) hours as needed for Pain. No current facility-administered medications on file prior to visit. No Known Allergies    OB History    No obstetric history on file. Obstetric Comments   Menarche:  8. LMP: 06/02/2015. # of Children:  0. Age at Delivery of First Child:  n/a. Hysterectomy/oophorectomy:  NO/NO. Breast Bx:  no.  Hx of Breast Feeding:  no. BCP:  yes. Hormone therapy:  no.                ROS    Physical Exam  Exam conducted with a chaperone present.    Cardiovascular:      Rate and Rhythm: Normal rate and regular rhythm. Heart sounds: Normal heart sounds. Pulmonary:      Breath sounds: Normal breath sounds. Chest:      Breasts: Breasts are symmetrical.         Right: Normal. No swelling, bleeding, inverted nipple, mass, nipple discharge, skin change or tenderness. Left: Normal. No swelling, bleeding, inverted nipple, mass, nipple discharge, skin change or tenderness. Lymphadenopathy:      Cervical:      Right cervical: No superficial, deep or posterior cervical adenopathy. Left cervical: No superficial, deep or posterior cervical adenopathy. Upper Body:      Right upper body: No supraclavicular or axillary adenopathy. Left upper body: No supraclavicular or axillary adenopathy. ASSESSMENT and PLAN    ICD-10-CM ICD-9-CM    1. Malignant neoplasm of right breast, stage 1, estrogen receptor positive (HCC) C50.911 174.9     Z17.0 V86.0       Patient presents to f/u on LEFT breast cancer, and is doing well overall. Well healed incision s/p mastectomy, no evidence of recurrence. Pt encouraged to continue exercise, and may use weights. Pt is 4 years LOVE. Will continue annual follow up. F/U in 1 year. This plan was reviewed with the patient and patient agrees. All questions were answered.     Written by Vashti Kraus, as dictated by Dr. Marvin Acuña MD.

## 2019-12-06 NOTE — PATIENT INSTRUCTIONS

## 2019-12-06 NOTE — PROGRESS NOTES
HISTORY OF PRESENT ILLNESS  Danica Portillo is a 54 y.o. female. HPIESTABLISHED patient here for annual follow up LEFT breast cancer. She is doing well. Denies any breast lumps or skin changes. Denies pain. Patient states she will  no longer do mammograms.       09/03/2015: LEFT lumpectomy of 0.3 cm, gr1-2 IDC with gr2 DCIS. ER+95%/AL+30% Her2- (DCIS: ER+95%/AL+5%). Positive margins. pT1a pNx Mx     10/07/2015: LEFT core bx of gr2 IDC with gr2-3 DCIS. ER+100%/AL+50% Her2- (DCIS: ER+100%/AL+20%)     Mammaprint: High risk luminal B     12/01/2015: LEFT SNBx with nipple bx. 0/6 LN involved. Benign breast tissue.     12/15/2015: LEFT mastectomy for 0.4 cm, gr2 IDC with gr3 DCIS. Clear margins. Pt had right breast implant also.     BRCA 2 gene NBN +     Pt declined Tamoxifen.  Has yet to f/u with a medical oncologist since last visit with Dr. Ryan Romano on 01/04/2016      Past Medical History:   Diagnosis Date    Breast cancer ([facility] [address]     LT    Cancer ([facility] [address] 9/2015    LEFT breast        Past Surgical History:   Procedure Laterality Date    HX BREAST LUMPECTOMY Left 9/3/2015    LEFT BREAST DUCTAL EXCISON performed by Stevie Cheek MD at [address] HX HEENT Left 1998 x 3    Retinal detachment    HX MASTECTOMY Bilateral 12/15/2015    LEFT BREAST TOTAL SKIN AND NIPPLE SPARING MASTECTOMY, INSERTION LEFT BREAST TISSUE EXPANDER WITH ALLOGRAFT PLACEMENT, INSERTION RIGHT BREAST IMPLANT performed by Stevie Cheek MD at [address] History     Socioeconomic History    Marital status:      Spouse name: Not on file    Number of children: Not on file    Years of education: Not on file    Highest education level: Not on file   Occupational History    Not on file   Social Needs    Financial resource strain: Not on file    Food insecurity:     Worry: Not on file     Inability: Not on file    Transportation needs:     Medical: Not on file     Non-medical: Not on file Tobacco Use    Smoking status: Never Smoker    Smokeless tobacco: Never Used   Substance and Sexual Activity    Alcohol use: Yes     Alcohol/week: 2.0 standard drinks     Types: 2 Glasses of wine per week    Drug use: No    Sexual activity: Not on file   Lifestyle    Physical activity:     Days per week: Not on file     Minutes per session: Not on file    Stress: Not on file   Relationships    Social connections:     Talks on phone: Not on file     Gets together: Not on file     Attends Mormon service: Not on file     Active member of club or organization: Not on file     Attends meetings of clubs or organizations: Not on file     Relationship status: Not on file    Intimate partner violence:     Fear of current or ex partner: Not on file     Emotionally abused: Not on file     Physically abused: Not on file     Forced sexual activity: Not on file   Other Topics Concern    Not on file   Social History Narrative    Not on file       Current Outpatient Medications on File Prior to Visit   Medication Sig Dispense Refill    TURMERIC PO Take 750 mg by mouth daily.  DIINDOLYLMETHANE, BULK, Take 100 mg by mouth daily.  B.infantis-B.ani-B.long-B.bifi (PROBIOTIC 4X) 10-15 mg TbEC Take  by mouth.  LUTEIN PO Take 20 mg by mouth daily.  omega-3 fatty acids-vitamin e 1,000 mg cap Take 1 Cap by mouth.  acetaminophen (TYLENOL) 325 mg tablet Take  by mouth every four (4) hours as needed for Pain. No current facility-administered medications on file prior to visit. No Known Allergies    OB History    No obstetric history on file. Obstetric Comments   Menarche:  8. LMP: 06/02/2015. # of Children:  0. Age at Delivery of First Child:  n/a. Hysterectomy/oophorectomy:  NO/NO. Breast Bx:  no.  Hx of Breast Feeding:  no. BCP:  yes. Hormone therapy:  no.                ROS    Physical Exam  Exam conducted with a chaperone present.    Cardiovascular:      Rate and Rhythm: Normal rate and regular rhythm. Heart sounds: Normal heart sounds. Pulmonary:      Breath sounds: Normal breath sounds. Chest:      Breasts: Breasts are symmetrical.         Right: Normal. No swelling, bleeding, inverted nipple, mass, nipple discharge, skin change or tenderness. Left: Normal. No swelling, bleeding, inverted nipple, mass, nipple discharge, skin change or tenderness. Lymphadenopathy:      Cervical:      Right cervical: No superficial, deep or posterior cervical adenopathy. Left cervical: No superficial, deep or posterior cervical adenopathy. Upper Body:      Right upper body: No supraclavicular or axillary adenopathy. Left upper body: No supraclavicular or axillary adenopathy. ASSESSMENT and PLAN    ICD-10-CM ICD-9-CM    1. Malignant neoplasm of right breast, stage 1, estrogen receptor positive (HCC) C50.911 174.9     Z17.0 V86.0       Patient presents to f/u on LEFT breast cancer, and is doing well overall. Well healed incision s/p mastectomy, no evidence of recurrence. Pt encouraged to continue exercise, and may use weights. Pt is 4 years LOVE. Will continue annual follow up. F/U in 1 year. This plan was reviewed with the patient and patient agrees. All questions were answered.     Written by Jen Julian, as dictated by Dr. Arleen Be MD.

## 2019-12-06 NOTE — PROGRESS NOTES
HISTORY OF PRESENT ILLNESS Hany Ortega is a 54 y.o. female. HPI  ESTABLISHED patient here for annual follow up LEFT breast cancer. She is doing well. Denies any breast lumps or skin changes. Denies pain. Patient states she will  no longer do mammograms. 09/03/2015: LEFT lumpectomy of 0.3 cm, gr1-2 IDC with gr2 DCIS. ER+95%/[place]+30% Her2- (DCIS: ER+95%/[place]+5%). Positive margins. pT1a pNx Mx 
 
10/07/2015: LEFT core bx of gr2 IDC with gr2-3 DCIS. ER+100%/[place]+50% Her2- (DCIS: ER+100%/[place]+20%) Mammaprint: High risk luminal B 
 
12/01/2015: LEFT SNBx with nipple bx. 0/6 LN involved. Benign breast tissue. 12/15/2015: LEFT mastectomy for 0.4 cm, gr2 IDC with gr3 DCIS. Clear margins. Pt had right breast implant also. BRCA 2 gene NBN + Pt refused Tamoxifen. Has yet to f/u with a medical oncologist since last visit with Dr. Loral Nageotte on 01/04/2016 ROS Physical Exam 
 
ASSESSMENT and PLAN 
{ASSESSMENT/PLAN:92791}

## 2023-05-17 ENCOUNTER — HOSPITAL ENCOUNTER (OUTPATIENT)
Facility: HOSPITAL | Age: 53
Setting detail: SPECIMEN
Discharge: HOME | End: 2023-05-20

## 2023-05-17 ENCOUNTER — OFFICE VISIT (OUTPATIENT)
Age: 53
End: 2023-05-17
Payer: COMMERCIAL

## 2023-05-17 VITALS — BODY MASS INDEX: 20.57 KG/M2 | HEIGHT: 58 IN | WEIGHT: 98 LBS

## 2023-05-17 DIAGNOSIS — N63.20 MASS OF LEFT BREAST, UNSPECIFIED QUADRANT: Primary | ICD-10-CM

## 2023-05-17 PROCEDURE — 76642 ULTRASOUND BREAST LIMITED: CPT | Performed by: SURGERY

## 2023-05-17 PROCEDURE — 99203 OFFICE O/P NEW LOW 30 MIN: CPT | Performed by: SURGERY

## 2023-05-17 PROCEDURE — 19083 BX BREAST 1ST LESION US IMAG: CPT | Performed by: SURGERY

## 2023-05-17 RX ORDER — ESCITALOPRAM OXALATE 10 MG/1
10 TABLET ORAL DAILY
COMMUNITY
Start: 2023-03-06

## 2023-05-17 RX ORDER — HYDROXYZINE HYDROCHLORIDE 25 MG/1
25 TABLET, FILM COATED ORAL 2 TIMES DAILY PRN
COMMUNITY
Start: 2023-04-10

## 2023-05-17 NOTE — PROGRESS NOTES
HISTORY OF PRESENT ILLNESS  Petr Waldron is a 50 y.o. female. HPI  NEW patient consult referred by  Dr. Elif Hassan  for LEFT  breast lump. Pt says she noticed lump in that area getting bigger. Pt denies any pain, skin changes. Family History:n/a                   Mammogram, 12/20/18, BIRADS 2  IMPRESSION:  1. Fat necrosis in the left breast.  BI-RADS Category 2 - Benign findings. 2. No visible mass or other sonographic abnormality in the inferior aspect of  the right breast. There is a small amount of tissue in this area which allowed  for a very thorough exam with technically excellent images. BI-RADS Category 2 - Benign findings                  Review of Systems   All other systems reviewed and are negative. 09/03/2015: LEFT lumpectomy of 0.3 cm, gr1-2 IDC with gr2 DCIS.   ER+95%/MO+30% Her2- (DCIS: ER+95%/MO+5%). Positive margins. pT1a pNx Mx  10/07/2015: LEFT core bx of gr2 IDC with gr2-3 DCIS. ER+100%/MO+50% Her2-   (DCIS: ER+100%/MO+20%)  Mammaprint: High risk luminal B  12/01/2015: LEFT SNBx with nipple bx. 0/6 LN involved. Benign breast   tissue. 12/15/2015: LEFT mastectomy for 0.4 cm, gr2 IDC with gr3 DCIS. Clear   margins. Pt had right breast implant also. BRCA 2 gene NBN +  Pt declined Tamoxifen.  Has yet to f/u with a medical oncologist since last   visit with Dr. Remi Ga on 01/04/2016           Past Medical History:   Diagnosis Date    Breast cancer ([address]     LT    Cancer ([facility] [address] 9/2015    LEFT breast        Past Surgical History:   Procedure Laterality Date    BREAST LUMPECTOMY Left 9/3/2015    LEFT BREAST DUCTAL EXCISON performed by Phyllis Shipman MD at [address] Left 1998 x 3    Retinal detachment    MASTECTOMY Bilateral 12/15/2015    LEFT BREAST TOTAL SKIN AND NIPPLE SPARING MASTECTOMY, INSERTION LEFT BREAST TISSUE EXPANDER WITH ALLOGRAFT PLACEMENT, INSERTION RIGHT BREAST IMPLANT performed by Phyllis Shipman MD at [facility]

## 2023-05-17 NOTE — PROGRESS NOTES
HISTORY OF PRESENT ILLNESS  Bubba Montanez is a 50 y.o. female     HPI NEW patient consult referred by  Dr. Klever Dinh  for LEFT  breast lump. Pt says she noticed lump in that area getting bigger. Pt denies any pain, skin changes. 09/03/2015: LEFT lumpectomy of 0.3 cm, gr1-2 IDC with gr2 DCIS.   ER+95%/DE+30% Her2- (DCIS: ER+95%/DE+5%). Positive margins. pT1a pNx Mx  10/07/2015: LEFT core bx of gr2 IDC with gr2-3 DCIS. ER+100%/DE+50% Her2-   (DCIS: ER+100%/DE+20%)  Mammaprint: High risk luminal B  12/01/2015: LEFT SNBx with nipple bx. 0/6 LN involved. Benign breast   tissue. 12/15/2015: LEFT mastectomy for 0.4 cm, gr2 IDC with gr3 DCIS. Clear   margins. Pt had right breast implant also. BRCA 2 gene NBN +  Pt declined Tamoxifen. Has yet to f/u with a medical oncologist since last   visit with Dr. Gabrielle Driver on 01/04/2016      Family History:n/a       Mammogram, 12/20/18, BIRADS 2  IMPRESSION:  1. Fat necrosis in the left breast.  BI-RADS Category 2 - Benign findings. 2. No visible mass or other sonographic abnormality in the inferior aspect of  the right breast. There is a small amount of tissue in this area which allowed  for a very thorough exam with technically excellent images. BI-RADS Category 2 - Benign findings      Review of Systems   All other systems reviewed and are negative.       Physical Exam       ASSESSMENT and PLAN  {Assessment and Plan Chronic Disease:[id number]}

## 2023-05-19 ENCOUNTER — TELEPHONE (OUTPATIENT)
Age: 53
End: 2023-05-19

## 2023-05-19 DIAGNOSIS — C50.912 RECURRENT BREAST CANCER, LEFT (HCC): Primary | ICD-10-CM

## 2023-05-22 ENCOUNTER — TELEPHONE (OUTPATIENT)
Age: 53
End: 2023-05-22

## 2023-05-22 NOTE — TELEPHONE ENCOUNTER
Mammaprint TRF faxed to [facility].. Confirmation fax received. Insurance letter mailed to patient.

## 2023-05-23 ENCOUNTER — TELEPHONE (OUTPATIENT)
Facility: HOSPITAL | Age: 53
End: 2023-05-23

## 2023-05-23 NOTE — TELEPHONE ENCOUNTER
[address]  Breast Cancer Nurse Navigator Encounter    Name: Lisbet Riley  Age: 50 y.o.  : 1970  Diagnosis: Left breast IDC; ER+/FL-/HER2-    Encounter type:  [x]Initial Navigator Encounter  []Patient Initiated  []Navigator Follow-up  []Other:     Narrative:   Called pt to introduce self/role of NN and to discuss next steps. No answer at this time. Left VM.        Interdisciplinary Team:  Surg-Onc: Linda Calle MD  Med-Onc:  Rad-Onc:  Plastics:  :   Nurse Navigator: ABAD Vega BSN, RN, VIA [facility]  Breast Cancer Nurse Navigator    [address]  [address]  W: [phone]  F: [phone]  [email or web address]   Good Help to Those in [facility]

## 2023-06-02 ENCOUNTER — TELEPHONE (OUTPATIENT)
Age: 53
End: 2023-06-02

## 2023-06-08 ENCOUNTER — HOSPITAL ENCOUNTER (OUTPATIENT)
Facility: HOSPITAL | Age: 53
Discharge: HOME | End: 2023-06-08
Payer: COMMERCIAL

## 2023-06-08 VITALS — BODY MASS INDEX: 20.48 KG/M2 | WEIGHT: 98 LBS

## 2023-06-08 DIAGNOSIS — C50.912 RECURRENT BREAST CANCER, LEFT (HCC): ICD-10-CM

## 2023-06-08 PROCEDURE — A9585 GADOBUTROL INJECTION: HCPCS | Performed by: RADIOLOGY

## 2023-06-08 PROCEDURE — C8908 MRI W/O FOL W/CONT, BREAST,: HCPCS

## 2023-06-08 PROCEDURE — 6360000004 HC RX CONTRAST MEDICATION: Performed by: RADIOLOGY

## 2023-06-08 RX ADMIN — GADOBUTROL 5 ML: 604.72 INJECTION INTRAVENOUS at 13:11

## 2023-06-14 ENCOUNTER — TELEPHONE (OUTPATIENT)
Age: 53
End: 2023-06-14

## 2023-06-20 ENCOUNTER — CLINICAL DOCUMENTATION (OUTPATIENT)
Age: 53
End: 2023-06-20

## 2023-06-20 ENCOUNTER — CLINICAL DOCUMENTATION (OUTPATIENT)
Facility: HOSPITAL | Age: 53
End: 2023-06-20

## 2023-06-20 ENCOUNTER — INITIAL CONSULT (OUTPATIENT)
Age: 53
End: 2023-06-20
Payer: COMMERCIAL

## 2023-06-20 VITALS
RESPIRATION RATE: 16 BRPM | HEART RATE: 71 BPM | TEMPERATURE: 98.1 F | SYSTOLIC BLOOD PRESSURE: 128 MMHG | OXYGEN SATURATION: 99 % | WEIGHT: 98 LBS | DIASTOLIC BLOOD PRESSURE: 72 MMHG | BODY MASS INDEX: 20.48 KG/M2

## 2023-06-20 DIAGNOSIS — Z17.0 MALIGNANT NEOPLASM OF CENTRAL PORTION OF LEFT BREAST IN FEMALE, ESTROGEN RECEPTOR POSITIVE (HCC): Primary | ICD-10-CM

## 2023-06-20 DIAGNOSIS — C50.112 MALIGNANT NEOPLASM OF CENTRAL PORTION OF LEFT BREAST IN FEMALE, ESTROGEN RECEPTOR POSITIVE (HCC): Primary | ICD-10-CM

## 2023-06-20 PROCEDURE — 99205 OFFICE O/P NEW HI 60 MIN: CPT | Performed by: INTERNAL MEDICINE

## 2023-06-20 NOTE — PROGRESS NOTES
Luis Alberto Maikel is a 50 y.o. female here today for a new patient breast cancer consult
Overall assessment: BI-RADS Assessment Category 4: Suspicious abnormality. 4. Recommendation: Bilateral second look ultrasound and ultrasound-guided  biopsy. MRI guided biopsy of these masses may not be possible with implants in  place. Records reviewed and summarized above. Pathology report(s) reviewed above. Radiology report(s) reviewed above. Assessment/plan:   1. Left breast recurrent cancer, ER +, SC negative, HER 2 negative, gr 1-2  rcT2 cN0 cM1, concern for metastatic disease  High risk mammprint    Discussed that I am very concerned that she may have metastatic disease to lungs and bone. Discussed with Dr. Nayeli Cuellar, he will attempt a bx of the clavicle. Discussed that a negative biopsy does not rule out metastatic disease. Discussed if metastatic disease:  lupron/goserelin + letrozole + ribociclib    We discussed ovarian suppression(such as monthly lupron 3.75 mg IM). Discussed side effects such as menopausal sx    The risks and benefits of aromatase inhibitors (anastrozole, letrozole, and exemestane) were discussed in detail and the patient was informed of the following: Risks include the development of painful muscles and joints (arthralgia/myalgia) and bone loss. Muscle and joint pain can be severe but rarely result in any tissue damage; symptoms usually resolve in several weeks when the medication is stopped. Bone loss is common and a bone density test is recommended as a baseline and then yearly to every several years depending on initial results. The risk of fractures is increased by a few percent in patients taking these drugs, but careful monitoring of bone density and using bone protecting agents when indicated can minimize these risks. Unlike tamoxifen there is no increased risk of blood clots or endometrial cancer. AIs can cause or worsen vaginal dryness. AIs can also cause or increase hot flashes. Any other symptoms should be reported.     Discussed side effects of ribociclib

## 2023-06-20 NOTE — PROGRESS NOTES
[address]  Breast Cancer Nurse Navigator Encounter    Name: Janet Toledo  Age: 50 y.o.  : 1970  Diagnosis: Left breast IDC; ER+/[place]-/HER2-    Encounter type:  [x]Initial Navigator Encounter  []Patient Initiated  []Navigator Follow-up  []Other:     Narrative:   Met with pt and her  while in clinic for her med/onc consult. Introduced self/role of NN. They are hoping to have a better idea of what is going on and what the plan will be. Assured them that they will leave here today with a lot more information than what they came in with and that  Dr. Pepper Love is very thorough. Answered questions about a PET vs bone scan/CT. Explained we don't routinely perform PET for breast cancer and why not. She will discuss with Dr. Pepper Love. They understand there are concerning lung nodules. Discussed that there will likely be discussion about biopsy to determine course of action. Again, I assure them Dr. Pepper Love will provide more information. Provided my contact info and encouraged pt to reach out as needed. Will follow-up.      Interdisciplinary Team:  Surg-Onc: Ama Costa MD  Med-Onc: Dominique Huber MD  Rad-Onc:  Plastics:  :   Nurse Navigator: Bry Davis, RN      Bry Davis, BSN, RN, [facility]IA [facility]  Breast Cancer Nurse Navigator    [address]  [address]  W: [phone]  F: [phone]  [email or web address]   Good Help to Those in [facility]

## 2023-06-21 NOTE — PROGRESS NOTES
Pharmacy Note- Chemotherapy Education    Estela Pires is a  50 y. o.female  diagnosed with breast cancer here today for chemotherapy counseling. Ms. Polk Lipann is considering treatment with lupron/goserelin + letrozole + ribociclib. Met with patient to provide handouts on each type of treatment. We discussed the method of administration for each and some of the more common side effects, such as nausea and fatigue. I also discussed that a lot has changed in the cancer world and we have better ways of managing those side effects than ever before. We reviewed the mechanism of action of each medication so patient could better understand what each is used for. Ms Schuler verbalized understanding and better comfort level after speaking to each of the providers. Patient is English-speaking.     Thank you,    Radha Dear, PharmD, BCPS    For Pharmacy Admin Tracking Only    Program: Medical Group  CPA in place:  Yes  Recommendation Provided To: Patient/Caregiver: 1 via In person  Intervention Detail: New Rx: 4, reason: Needs Additional Therapy  Intervention Accepted By: Patient/Caregiver: 1  Time Spent (min): 20

## 2023-06-30 ENCOUNTER — HOSPITAL ENCOUNTER (OUTPATIENT)
Facility: HOSPITAL | Age: 53
End: 2023-06-30
Attending: INTERNAL MEDICINE
Payer: COMMERCIAL

## 2023-06-30 VITALS
RESPIRATION RATE: 18 BRPM | TEMPERATURE: 98.2 F | WEIGHT: 95 LBS | SYSTOLIC BLOOD PRESSURE: 116 MMHG | OXYGEN SATURATION: 97 % | HEIGHT: 58 IN | BODY MASS INDEX: 19.94 KG/M2 | HEART RATE: 64 BPM | DIASTOLIC BLOOD PRESSURE: 68 MMHG

## 2023-06-30 DIAGNOSIS — C50.112 MALIGNANT NEOPLASM OF CENTRAL PORTION OF LEFT BREAST IN FEMALE, ESTROGEN RECEPTOR POSITIVE (HCC): ICD-10-CM

## 2023-06-30 DIAGNOSIS — Z17.0 MALIGNANT NEOPLASM OF CENTRAL PORTION OF LEFT BREAST IN FEMALE, ESTROGEN RECEPTOR POSITIVE (HCC): ICD-10-CM

## 2023-06-30 PROCEDURE — 88307 TISSUE EXAM BY PATHOLOGIST: CPT

## 2023-06-30 PROCEDURE — 6360000002 HC RX W HCPCS: Performed by: PHYSICAL MEDICINE & REHABILITATION

## 2023-06-30 PROCEDURE — 6360000002 HC RX W HCPCS: Performed by: RADIOLOGY

## 2023-06-30 PROCEDURE — 2580000003 HC RX 258: Performed by: RADIOLOGY

## 2023-06-30 PROCEDURE — 20220 BONE BIOPSY TROCAR/NDL SUPFC: CPT

## 2023-06-30 PROCEDURE — 88342 IMHCHEM/IMCYTCHM 1ST ANTB: CPT

## 2023-06-30 PROCEDURE — 99152 MOD SED SAME PHYS/QHP 5/>YRS: CPT

## 2023-06-30 PROCEDURE — 99153 MOD SED SAME PHYS/QHP EA: CPT

## 2023-06-30 RX ORDER — FENTANYL CITRATE 50 UG/ML
100 INJECTION, SOLUTION INTRAMUSCULAR; INTRAVENOUS AS NEEDED
Status: DISCONTINUED | OUTPATIENT
Start: 2023-06-30 | End: 2023-06-30

## 2023-06-30 RX ORDER — MIDAZOLAM HYDROCHLORIDE 1 MG/ML
5 INJECTION INTRAMUSCULAR; INTRAVENOUS ONCE
Status: COMPLETED | OUTPATIENT
Start: 2023-06-30 | End: 2023-06-30

## 2023-06-30 RX ORDER — MIDAZOLAM HYDROCHLORIDE 1 MG/ML
4 INJECTION INTRAMUSCULAR; INTRAVENOUS AS NEEDED
Status: DISCONTINUED | OUTPATIENT
Start: 2023-06-30 | End: 2023-07-04 | Stop reason: HOSPADM

## 2023-06-30 RX ORDER — SODIUM CHLORIDE 9 MG/ML
INJECTION, SOLUTION INTRAVENOUS CONTINUOUS
Status: DISCONTINUED | OUTPATIENT
Start: 2023-06-30 | End: 2023-07-04 | Stop reason: HOSPADM

## 2023-06-30 RX ORDER — MIDAZOLAM HYDROCHLORIDE 1 MG/ML
5 INJECTION INTRAMUSCULAR; INTRAVENOUS AS NEEDED
Status: DISCONTINUED | OUTPATIENT
Start: 2023-06-30 | End: 2023-06-30

## 2023-06-30 RX ORDER — FENTANYL CITRATE 50 UG/ML
100 INJECTION, SOLUTION INTRAMUSCULAR; INTRAVENOUS AS NEEDED
Status: DISCONTINUED | OUTPATIENT
Start: 2023-06-30 | End: 2023-07-04 | Stop reason: HOSPADM

## 2023-06-30 RX ADMIN — FENTANYL CITRATE 25 MCG: 0.05 INJECTION, SOLUTION INTRAMUSCULAR; INTRAVENOUS at 10:05

## 2023-06-30 RX ADMIN — FENTANYL CITRATE 25 MCG: 0.05 INJECTION, SOLUTION INTRAMUSCULAR; INTRAVENOUS at 10:02

## 2023-06-30 RX ADMIN — MIDAZOLAM HYDROCHLORIDE 1 MG: 1 INJECTION, SOLUTION INTRAMUSCULAR; INTRAVENOUS at 10:00

## 2023-06-30 RX ADMIN — MIDAZOLAM HYDROCHLORIDE 1 MG: 1 INJECTION, SOLUTION INTRAMUSCULAR; INTRAVENOUS at 10:11

## 2023-06-30 RX ADMIN — MIDAZOLAM HYDROCHLORIDE 1 MG: 1 INJECTION, SOLUTION INTRAMUSCULAR; INTRAVENOUS at 10:03

## 2023-06-30 RX ADMIN — SODIUM CHLORIDE 25 ML/HR: 9 INJECTION, SOLUTION INTRAVENOUS at 10:00

## 2023-06-30 RX ADMIN — FENTANYL CITRATE 25 MCG: 0.05 INJECTION, SOLUTION INTRAMUSCULAR; INTRAVENOUS at 10:40

## 2023-06-30 RX ADMIN — MIDAZOLAM HYDROCHLORIDE 1 MG: 1 INJECTION, SOLUTION INTRAMUSCULAR; INTRAVENOUS at 10:41

## 2023-06-30 RX ADMIN — MIDAZOLAM HYDROCHLORIDE 1 MG: 1 INJECTION, SOLUTION INTRAMUSCULAR; INTRAVENOUS at 10:17

## 2023-06-30 RX ADMIN — FENTANYL CITRATE 25 MCG: 0.05 INJECTION, SOLUTION INTRAMUSCULAR; INTRAVENOUS at 10:13

## 2023-06-30 RX ADMIN — FENTANYL CITRATE 25 MCG: 0.05 INJECTION, SOLUTION INTRAMUSCULAR; INTRAVENOUS at 10:09

## 2023-06-30 RX ADMIN — MIDAZOLAM HYDROCHLORIDE 1 MG: 1 INJECTION, SOLUTION INTRAMUSCULAR; INTRAVENOUS at 10:45

## 2023-06-30 RX ADMIN — MIDAZOLAM HYDROCHLORIDE 1 MG: 1 INJECTION, SOLUTION INTRAMUSCULAR; INTRAVENOUS at 10:07

## 2023-06-30 RX ADMIN — FENTANYL CITRATE 25 MCG: 0.05 INJECTION, SOLUTION INTRAMUSCULAR; INTRAVENOUS at 10:25

## 2023-07-03 ENCOUNTER — TELEPHONE (OUTPATIENT)
Age: 53
End: 2023-07-03

## 2023-07-03 NOTE — TELEPHONE ENCOUNTER
Lm with patient to let her know her appointment was pushed out 1 week due to waiting on biopsy results. New appt date is 7/12/23 at 10:30am, gave her office number to call back with any questions.

## 2023-07-11 ENCOUNTER — TELEPHONE (OUTPATIENT)
Age: 53
End: 2023-07-11

## 2023-07-11 NOTE — TELEPHONE ENCOUNTER
Yoan Tabares at [facility]  [phone]    07/11/23 5:25 PM - Called patient and left a message to see if she would be willing to move her appointment time back to 2:45 tomorrow 7/12/23 to help cut down on her wait time and to give her more time to speak with Dr. Kiara Go. Requested a call back.

## 2023-07-12 ENCOUNTER — CLINICAL DOCUMENTATION (OUTPATIENT)
Age: 53
End: 2023-07-12

## 2023-07-12 ENCOUNTER — OFFICE VISIT (OUTPATIENT)
Age: 53
End: 2023-07-12
Payer: COMMERCIAL

## 2023-07-12 VITALS
RESPIRATION RATE: 16 BRPM | DIASTOLIC BLOOD PRESSURE: 85 MMHG | OXYGEN SATURATION: 98 % | TEMPERATURE: 98.2 F | BODY MASS INDEX: 19.65 KG/M2 | SYSTOLIC BLOOD PRESSURE: 145 MMHG | WEIGHT: 94 LBS | HEART RATE: 70 BPM

## 2023-07-12 DIAGNOSIS — Z17.0 MALIGNANT NEOPLASM OF CENTRAL PORTION OF LEFT BREAST IN FEMALE, ESTROGEN RECEPTOR POSITIVE (HCC): Primary | ICD-10-CM

## 2023-07-12 DIAGNOSIS — C50.919 MALIGNANT NEOPLASM OF BREAST, STAGE 1, UNSPECIFIED ESTROGEN RECEPTOR STATUS, UNSPECIFIED LATERALITY (HCC): Primary | ICD-10-CM

## 2023-07-12 DIAGNOSIS — C50.112 MALIGNANT NEOPLASM OF CENTRAL PORTION OF LEFT BREAST IN FEMALE, ESTROGEN RECEPTOR POSITIVE (HCC): Primary | ICD-10-CM

## 2023-07-12 PROCEDURE — 99215 OFFICE O/P EST HI 40 MIN: CPT | Performed by: INTERNAL MEDICINE

## 2023-07-12 RX ORDER — SODIUM CHLORIDE 9 MG/ML
INJECTION, SOLUTION INTRAVENOUS CONTINUOUS
OUTPATIENT
Start: 2023-07-17

## 2023-07-12 RX ORDER — ALBUTEROL SULFATE 90 UG/1
4 AEROSOL, METERED RESPIRATORY (INHALATION) PRN
OUTPATIENT
Start: 2023-07-17

## 2023-07-12 RX ORDER — DIPHENHYDRAMINE HYDROCHLORIDE 50 MG/ML
50 INJECTION INTRAMUSCULAR; INTRAVENOUS
OUTPATIENT
Start: 2023-08-14

## 2023-07-12 RX ORDER — EPINEPHRINE 1 MG/ML
0.3 INJECTION, SOLUTION, CONCENTRATE INTRAVENOUS PRN
OUTPATIENT
Start: 2023-07-17

## 2023-07-12 RX ORDER — SODIUM CHLORIDE 9 MG/ML
INJECTION, SOLUTION INTRAVENOUS CONTINUOUS
OUTPATIENT
Start: 2023-08-14

## 2023-07-12 RX ORDER — SODIUM CHLORIDE 0.9 % (FLUSH) 0.9 %
5-40 SYRINGE (ML) INJECTION PRN
OUTPATIENT
Start: 2023-08-14

## 2023-07-12 RX ORDER — HEPARIN SODIUM 100 [USP'U]/ML
500 INJECTION, SOLUTION INTRAVENOUS PRN
OUTPATIENT
Start: 2023-08-14

## 2023-07-12 RX ORDER — SODIUM CHLORIDE 9 MG/ML
5-250 INJECTION, SOLUTION INTRAVENOUS PRN
OUTPATIENT
Start: 2023-08-14

## 2023-07-12 RX ORDER — EPINEPHRINE 1 MG/ML
0.3 INJECTION, SOLUTION, CONCENTRATE INTRAVENOUS PRN
OUTPATIENT
Start: 2023-08-14

## 2023-07-12 RX ORDER — ACETAMINOPHEN 325 MG/1
650 TABLET ORAL
OUTPATIENT
Start: 2023-08-14

## 2023-07-12 RX ORDER — ZOLEDRONIC ACID 0.04 MG/ML
4 INJECTION, SOLUTION INTRAVENOUS ONCE
OUTPATIENT
Start: 2023-08-14 | End: 2023-08-14

## 2023-07-12 RX ORDER — ONDANSETRON 2 MG/ML
8 INJECTION INTRAMUSCULAR; INTRAVENOUS
OUTPATIENT
Start: 2023-08-14

## 2023-07-12 RX ORDER — ONDANSETRON 2 MG/ML
8 INJECTION INTRAMUSCULAR; INTRAVENOUS
OUTPATIENT
Start: 2023-07-17

## 2023-07-12 RX ORDER — ACETAMINOPHEN 325 MG/1
650 TABLET ORAL
OUTPATIENT
Start: 2023-07-17

## 2023-07-12 RX ORDER — DIPHENHYDRAMINE HYDROCHLORIDE 50 MG/ML
50 INJECTION INTRAMUSCULAR; INTRAVENOUS
OUTPATIENT
Start: 2023-07-17

## 2023-07-12 RX ORDER — ALBUTEROL SULFATE 90 UG/1
4 AEROSOL, METERED RESPIRATORY (INHALATION) PRN
OUTPATIENT
Start: 2023-08-14

## 2023-07-12 NOTE — PROGRESS NOTES
[facility] [place] at [address], [address] [place] [address]  [place]: [phone]  F: [phone]      Reason for Visit:   Unruly Justice is a 48 y.o. female who is seen in consultation at the request of Dr. Dimitri Marmolejo for evaluation of therapy for breast cancer. Treatment History:   9/3/15 left breast lumpectomy:  IDC 0.3 cm, gr 1-2, ER + at 95%, IL + at 30%, HER 2 negative at [facility] 1+; DCIS present, solid, papillary, gr 2; pT1a pNx cM0  10/7/15  left breast core bx:  IDC, gr 2, ER + at 100%, IL + at 50%, HER 2 negative at [facility] 0; DCIS gr 2-3, ER + at 100%, IL + at 20%  Mammaprint shows high risk luminal B 2015  12/1/15:  SLN bx:  0/6 LN  Bruce rodriguez:  NBN pathologic mutation; BRCA2 VUS  12/15/15:  left breast mastectomy:  IDC, 0.4 cm, gr 2, HER 2 negative at [facility] 0; extensive DCIS 1.4 cm, solid, gr 3, no LVI, 0/6 LN, pT1a pN0 cM0  Declined chemotherapy and endocrine therapy  5/17/23 left breast mass core bx:  IDC, gr 1-2, 1.3 cm, ER + at 95%, IL < 1%, HER 2 negative at [facility] 0, ki67 5%; DCIS ER + at 95%, IL negative; + skeletal muscle involvement  Mammaprint shows high risk luminal B (-0.273) (2023)  6/30/23 right clavicle bone lesion:  metastatic breast cancer, ER +, weakly 1-10%, IL negative, HER 2 negative at [facility] 0    History of Present Illness: In 2015, abnormal discharge led to the original pathology. Mamogram 12/2014 was negative. Surgical bx originally. She states she noticed an abnormality in her L breast in 2016, MRI negative that year, MRI shows abnormality in 2018, US called it fat necrosis. Mass grew this year, biopsy above.     Interval history:  gr 1 insomnia      LMP 7/4/23    Past Medical History:   Diagnosis Date    Breast cancer ([address])     LT    Cancer ([address]) 9/2015    LEFT breast       Past Surgical History:   Procedure Laterality Date    BREAST LUMPECTOMY Left 9/3/2015    LEFT BREAST DUCTAL EXCISON performed by Corey Read MD at [facility]

## 2023-07-12 NOTE — PROGRESS NOTES
Ale Shah is a 48 y.o. female here today to follow up for breast cancer       1. Have you been to the ER, urgent care clinic since your last visit? Hospitalized since your last visit? no    2. Have you seen or consulted any other health care providers outside of the [address] [place] [address] since your last visit? Include any pap smears or colon screening.  no

## 2023-07-14 ENCOUNTER — TELEPHONE (OUTPATIENT)
Age: 53
End: 2023-07-14

## 2023-07-14 NOTE — PROGRESS NOTES
Pharmacy Note- Chemotherapy Education    Salbador Mcclellan is a  48 y. o.female  diagnosed with breast cancer here today for chemotherapy counseling. Ms. Martinez Ceballos is considering treatment with lupron/goserelin + letrozole + ribociclib. Side effects and % risk of selected side effects discussed in full. We also reviewed possible supportive medications, including Imodium and antinausea medications. The patient was comfortable with the expected course of treatment, medication purpose, potential side effects and delivery process at the end of our conversation. She verbalized understanding and had no additional questions/concerns.     Thank you,    Marylou Canales, PharmD, [facility]    For Pharmacy Admin Tracking Only    Program: Medical Group  CPA in place:  Yes  Recommendation Provided To: Patient/Caregiver: 1 via In person  Intervention Detail: New Rx: 4, reason: Improve Adherence, Needs Additional Therapy  Intervention Accepted By: Patient/Caregiver: 1  Time Spent (min): 60

## 2023-07-17 ENCOUNTER — HOSPITAL ENCOUNTER (OUTPATIENT)
Facility: HOSPITAL | Age: 53
Setting detail: INFUSION SERIES
End: 2023-07-17
Payer: COMMERCIAL

## 2023-07-17 VITALS
WEIGHT: 95.7 LBS | DIASTOLIC BLOOD PRESSURE: 84 MMHG | HEART RATE: 64 BPM | SYSTOLIC BLOOD PRESSURE: 139 MMHG | BODY MASS INDEX: 20.09 KG/M2 | HEIGHT: 58 IN | OXYGEN SATURATION: 98 % | TEMPERATURE: 98.9 F | RESPIRATION RATE: 16 BRPM

## 2023-07-17 DIAGNOSIS — C50.919 MALIGNANT NEOPLASM OF BREAST, STAGE 1, UNSPECIFIED ESTROGEN RECEPTOR STATUS, UNSPECIFIED LATERALITY (HCC): Primary | ICD-10-CM

## 2023-07-17 LAB
ALBUMIN SERPL-MCNC: 3.7 G/DL (ref 3.5–5)
ALBUMIN/GLOB SERPL: 1 (ref 1.1–2.2)
ALP SERPL-CCNC: 85 U/L (ref 45–117)
ALT SERPL-CCNC: 18 U/L (ref 12–78)
ANION GAP SERPL CALC-SCNC: 5 MMOL/L (ref 5–15)
AST SERPL-CCNC: 13 U/L (ref 15–37)
BASOPHILS # BLD: 0.1 K/UL (ref 0–0.1)
BASOPHILS NFR BLD: 1 % (ref 0–1)
BILIRUB SERPL-MCNC: 0.2 MG/DL (ref 0.2–1)
BUN SERPL-MCNC: 23 MG/DL (ref 6–20)
BUN/CREAT SERPL: 28 (ref 12–20)
CALCIUM SERPL-MCNC: 8.6 MG/DL (ref 8.5–10.1)
CHLORIDE SERPL-SCNC: 110 MMOL/L (ref 97–108)
CO2 SERPL-SCNC: 24 MMOL/L (ref 21–32)
CREAT SERPL-MCNC: 0.81 MG/DL (ref 0.55–1.02)
DIFFERENTIAL METHOD BLD: NORMAL
EOSINOPHIL # BLD: 0.1 K/UL (ref 0–0.4)
EOSINOPHIL NFR BLD: 2 % (ref 0–7)
ERYTHROCYTE [DISTWIDTH] IN BLOOD BY AUTOMATED COUNT: 12.3 % (ref 11.5–14.5)
GLOBULIN SER CALC-MCNC: 3.7 G/DL (ref 2–4)
GLUCOSE SERPL-MCNC: 117 MG/DL (ref 65–100)
HCT VFR BLD AUTO: 40.1 % (ref 35–47)
HGB BLD-MCNC: 13.6 G/DL (ref 11.5–16)
IMM GRANULOCYTES # BLD AUTO: 0 K/UL (ref 0–0.04)
IMM GRANULOCYTES NFR BLD AUTO: 0 % (ref 0–0.5)
LYMPHOCYTES # BLD: 2 K/UL (ref 0.8–3.5)
LYMPHOCYTES NFR BLD: 23 % (ref 12–49)
MCH RBC QN AUTO: 31.6 PG (ref 26–34)
MCHC RBC AUTO-ENTMCNC: 33.9 G/DL (ref 30–36.5)
MCV RBC AUTO: 93.3 FL (ref 80–99)
MONOCYTES # BLD: 0.5 K/UL (ref 0–1)
MONOCYTES NFR BLD: 6 % (ref 5–13)
NEUTS SEG # BLD: 6 K/UL (ref 1.8–8)
NEUTS SEG NFR BLD: 68 % (ref 32–75)
NRBC # BLD: 0 K/UL (ref 0–0.01)
NRBC BLD-RTO: 0 PER 100 WBC
PLATELET # BLD AUTO: 333 K/UL (ref 150–400)
PMV BLD AUTO: 9.7 FL (ref 8.9–12.9)
POTASSIUM SERPL-SCNC: 4.1 MMOL/L (ref 3.5–5.1)
PROT SERPL-MCNC: 7.4 G/DL (ref 6.4–8.2)
RBC # BLD AUTO: 4.3 M/UL (ref 3.8–5.2)
SODIUM SERPL-SCNC: 139 MMOL/L (ref 136–145)
WBC # BLD AUTO: 8.8 K/UL (ref 3.6–11)

## 2023-07-17 PROCEDURE — 6360000002 HC RX W HCPCS: Performed by: INTERNAL MEDICINE

## 2023-07-17 PROCEDURE — 85025 COMPLETE CBC W/AUTO DIFF WBC: CPT

## 2023-07-17 PROCEDURE — 36415 COLL VENOUS BLD VENIPUNCTURE: CPT

## 2023-07-17 PROCEDURE — 80053 COMPREHEN METABOLIC PANEL: CPT

## 2023-07-17 PROCEDURE — 96402 CHEMO HORMON ANTINEOPL SQ/IM: CPT

## 2023-07-17 RX ORDER — EPINEPHRINE 1 MG/ML
0.3 INJECTION, SOLUTION, CONCENTRATE INTRAVENOUS PRN
Status: CANCELLED | OUTPATIENT
Start: 2023-07-24

## 2023-07-17 RX ORDER — ONDANSETRON 2 MG/ML
8 INJECTION INTRAMUSCULAR; INTRAVENOUS
Status: CANCELLED | OUTPATIENT
Start: 2023-07-24

## 2023-07-17 RX ORDER — ALBUTEROL SULFATE 90 UG/1
4 AEROSOL, METERED RESPIRATORY (INHALATION) PRN
Status: CANCELLED | OUTPATIENT
Start: 2023-07-24

## 2023-07-17 RX ORDER — DIPHENHYDRAMINE HYDROCHLORIDE 50 MG/ML
50 INJECTION INTRAMUSCULAR; INTRAVENOUS
Status: CANCELLED | OUTPATIENT
Start: 2023-07-24

## 2023-07-17 RX ORDER — SODIUM CHLORIDE 9 MG/ML
INJECTION, SOLUTION INTRAVENOUS CONTINUOUS
Status: CANCELLED | OUTPATIENT
Start: 2023-07-24

## 2023-07-17 RX ORDER — ACETAMINOPHEN 325 MG/1
650 TABLET ORAL
Status: CANCELLED | OUTPATIENT
Start: 2023-07-24

## 2023-07-17 RX ADMIN — LEUPROLIDE ACETATE 3.75 MG: KIT at 13:51

## 2023-07-17 ASSESSMENT — PAIN SCALES - GENERAL: PAINLEVEL_OUTOF10: 0

## 2023-07-19 NOTE — PROGRESS NOTES
Is This A New Presentation, Or A Follow-Up?: Skin Lesion Sent message to RICKY Butler at [facility] that she can call the patient to schedule U/S and possible biopsies. What Type Of Note Output Would You Prefer (Optional)?: Bullet Format How Severe Is Your Skin Lesion?: moderate Has Your Skin Lesion Been Treated?: not been treated

## 2023-07-20 NOTE — TELEPHONE ENCOUNTER
7/20/23- Per Kriss with [facility] patients order for Rebecca Mike will be delivered to patients home address on 8/11/23 per patients request and the cost of care is $0 copay. No further financial assistance required at this time.

## 2023-08-11 ENCOUNTER — CLINICAL DOCUMENTATION (OUTPATIENT)
Age: 53
End: 2023-08-11

## 2023-08-11 NOTE — PROGRESS NOTES
For Pharmacy Admin Tracking Only    Program: Medical Group  CPA in place:  Yes  Recommendation Provided To: Patient/Caregiver: 1 via Disqus Message  Intervention Detail: Adherence Monitorin  Intervention Accepted By: Patient/Caregiver: 1  Gap Closed?: No   Time Spent (min): 15

## 2023-08-14 ENCOUNTER — HOSPITAL ENCOUNTER (OUTPATIENT)
Facility: HOSPITAL | Age: 53
Setting detail: INFUSION SERIES
End: 2023-08-14
Payer: COMMERCIAL

## 2023-08-14 ENCOUNTER — OFFICE VISIT (OUTPATIENT)
Age: 53
End: 2023-08-14
Payer: COMMERCIAL

## 2023-08-14 VITALS
BODY MASS INDEX: 20.76 KG/M2 | OXYGEN SATURATION: 95 % | DIASTOLIC BLOOD PRESSURE: 75 MMHG | SYSTOLIC BLOOD PRESSURE: 127 MMHG | WEIGHT: 98.9 LBS | TEMPERATURE: 98.7 F | HEART RATE: 62 BPM | HEIGHT: 58 IN | RESPIRATION RATE: 16 BRPM

## 2023-08-14 VITALS
OXYGEN SATURATION: 93 % | WEIGHT: 98.9 LBS | HEART RATE: 69 BPM | RESPIRATION RATE: 16 BRPM | DIASTOLIC BLOOD PRESSURE: 85 MMHG | TEMPERATURE: 98.9 F | BODY MASS INDEX: 20.76 KG/M2 | HEIGHT: 58 IN | SYSTOLIC BLOOD PRESSURE: 130 MMHG

## 2023-08-14 DIAGNOSIS — C50.112 MALIGNANT NEOPLASM OF CENTRAL PORTION OF LEFT BREAST IN FEMALE, ESTROGEN RECEPTOR POSITIVE (HCC): Primary | ICD-10-CM

## 2023-08-14 DIAGNOSIS — C50.919 MALIGNANT NEOPLASM OF BREAST, STAGE 1, UNSPECIFIED ESTROGEN RECEPTOR STATUS, UNSPECIFIED LATERALITY (HCC): Primary | ICD-10-CM

## 2023-08-14 DIAGNOSIS — Z17.0 MALIGNANT NEOPLASM OF CENTRAL PORTION OF LEFT BREAST IN FEMALE, ESTROGEN RECEPTOR POSITIVE (HCC): Primary | ICD-10-CM

## 2023-08-14 LAB
ALBUMIN SERPL-MCNC: 3.6 G/DL (ref 3.5–5)
ALBUMIN/GLOB SERPL: 1 (ref 1.1–2.2)
ALP SERPL-CCNC: 88 U/L (ref 45–117)
ALT SERPL-CCNC: 30 U/L (ref 12–78)
ANION GAP SERPL CALC-SCNC: 5 MMOL/L (ref 5–15)
AST SERPL-CCNC: 22 U/L (ref 15–37)
BASOPHILS # BLD: 0.1 K/UL (ref 0–0.1)
BASOPHILS NFR BLD: 1 % (ref 0–1)
BILIRUB SERPL-MCNC: 0.2 MG/DL (ref 0.2–1)
BUN SERPL-MCNC: 19 MG/DL (ref 6–20)
BUN/CREAT SERPL: 21 (ref 12–20)
CALCIUM SERPL-MCNC: 9 MG/DL (ref 8.5–10.1)
CHLORIDE SERPL-SCNC: 110 MMOL/L (ref 97–108)
CO2 SERPL-SCNC: 26 MMOL/L (ref 21–32)
CREAT SERPL-MCNC: 0.89 MG/DL (ref 0.55–1.02)
DIFFERENTIAL METHOD BLD: NORMAL
EOSINOPHIL # BLD: 0.1 K/UL (ref 0–0.4)
EOSINOPHIL NFR BLD: 2 % (ref 0–7)
ERYTHROCYTE [DISTWIDTH] IN BLOOD BY AUTOMATED COUNT: 12 % (ref 11.5–14.5)
GLOBULIN SER CALC-MCNC: 3.7 G/DL (ref 2–4)
GLUCOSE SERPL-MCNC: 136 MG/DL (ref 65–100)
HCT VFR BLD AUTO: 38.9 % (ref 35–47)
HGB BLD-MCNC: 13 G/DL (ref 11.5–16)
IMM GRANULOCYTES # BLD AUTO: 0 K/UL (ref 0–0.04)
IMM GRANULOCYTES NFR BLD AUTO: 0 % (ref 0–0.5)
LYMPHOCYTES # BLD: 1.8 K/UL (ref 0.8–3.5)
LYMPHOCYTES NFR BLD: 29 % (ref 12–49)
MCH RBC QN AUTO: 31.3 PG (ref 26–34)
MCHC RBC AUTO-ENTMCNC: 33.4 G/DL (ref 30–36.5)
MCV RBC AUTO: 93.7 FL (ref 80–99)
MONOCYTES # BLD: 0.4 K/UL (ref 0–1)
MONOCYTES NFR BLD: 6 % (ref 5–13)
NEUTS SEG # BLD: 3.7 K/UL (ref 1.8–8)
NEUTS SEG NFR BLD: 62 % (ref 32–75)
NRBC # BLD: 0 K/UL (ref 0–0.01)
NRBC BLD-RTO: 0 PER 100 WBC
PLATELET # BLD AUTO: 304 K/UL (ref 150–400)
PMV BLD AUTO: 9.8 FL (ref 8.9–12.9)
POTASSIUM SERPL-SCNC: 3.5 MMOL/L (ref 3.5–5.1)
PROT SERPL-MCNC: 7.3 G/DL (ref 6.4–8.2)
RBC # BLD AUTO: 4.15 M/UL (ref 3.8–5.2)
SODIUM SERPL-SCNC: 141 MMOL/L (ref 136–145)
WBC # BLD AUTO: 6.1 K/UL (ref 3.6–11)

## 2023-08-14 PROCEDURE — 96402 CHEMO HORMON ANTINEOPL SQ/IM: CPT

## 2023-08-14 PROCEDURE — 80053 COMPREHEN METABOLIC PANEL: CPT

## 2023-08-14 PROCEDURE — 96365 THER/PROPH/DIAG IV INF INIT: CPT

## 2023-08-14 PROCEDURE — 2580000003 HC RX 258: Performed by: INTERNAL MEDICINE

## 2023-08-14 PROCEDURE — 85025 COMPLETE CBC W/AUTO DIFF WBC: CPT

## 2023-08-14 PROCEDURE — 99215 OFFICE O/P EST HI 40 MIN: CPT | Performed by: INTERNAL MEDICINE

## 2023-08-14 PROCEDURE — 36415 COLL VENOUS BLD VENIPUNCTURE: CPT

## 2023-08-14 PROCEDURE — 6360000002 HC RX W HCPCS: Performed by: INTERNAL MEDICINE

## 2023-08-14 RX ORDER — SODIUM CHLORIDE 0.9 % (FLUSH) 0.9 %
5-40 SYRINGE (ML) INJECTION PRN
OUTPATIENT
Start: 2023-11-05

## 2023-08-14 RX ORDER — ONDANSETRON 2 MG/ML
8 INJECTION INTRAMUSCULAR; INTRAVENOUS
OUTPATIENT
Start: 2023-11-05

## 2023-08-14 RX ORDER — DIPHENHYDRAMINE HYDROCHLORIDE 50 MG/ML
50 INJECTION INTRAMUSCULAR; INTRAVENOUS
Status: CANCELLED | OUTPATIENT
Start: 2023-08-21

## 2023-08-14 RX ORDER — SODIUM CHLORIDE 9 MG/ML
5-250 INJECTION, SOLUTION INTRAVENOUS PRN
OUTPATIENT
Start: 2023-11-05

## 2023-08-14 RX ORDER — ZOLEDRONIC ACID 0.04 MG/ML
4 INJECTION, SOLUTION INTRAVENOUS ONCE
Status: CANCELLED | OUTPATIENT
Start: 2023-11-05 | End: 2023-11-05

## 2023-08-14 RX ORDER — SODIUM CHLORIDE 9 MG/ML
INJECTION, SOLUTION INTRAVENOUS CONTINUOUS
Status: CANCELLED | OUTPATIENT
Start: 2023-08-21

## 2023-08-14 RX ORDER — ACETAMINOPHEN 325 MG/1
650 TABLET ORAL
OUTPATIENT
Start: 2023-11-05

## 2023-08-14 RX ORDER — SODIUM CHLORIDE 9 MG/ML
INJECTION, SOLUTION INTRAVENOUS CONTINUOUS
OUTPATIENT
Start: 2023-11-05

## 2023-08-14 RX ORDER — ONDANSETRON 2 MG/ML
8 INJECTION INTRAMUSCULAR; INTRAVENOUS
Status: CANCELLED | OUTPATIENT
Start: 2023-08-21

## 2023-08-14 RX ORDER — ALBUTEROL SULFATE 90 UG/1
4 AEROSOL, METERED RESPIRATORY (INHALATION) PRN
OUTPATIENT
Start: 2023-11-05

## 2023-08-14 RX ORDER — EPINEPHRINE 1 MG/ML
0.3 INJECTION, SOLUTION, CONCENTRATE INTRAVENOUS PRN
Status: CANCELLED | OUTPATIENT
Start: 2023-08-21

## 2023-08-14 RX ORDER — SODIUM CHLORIDE 9 MG/ML
5-250 INJECTION, SOLUTION INTRAVENOUS PRN
Status: DISCONTINUED | OUTPATIENT
Start: 2023-08-14 | End: 2023-08-15 | Stop reason: HOSPADM

## 2023-08-14 RX ORDER — DIPHENHYDRAMINE HYDROCHLORIDE 50 MG/ML
50 INJECTION INTRAMUSCULAR; INTRAVENOUS
OUTPATIENT
Start: 2023-11-05

## 2023-08-14 RX ORDER — ALBUTEROL SULFATE 90 UG/1
4 AEROSOL, METERED RESPIRATORY (INHALATION) PRN
Status: CANCELLED | OUTPATIENT
Start: 2023-08-21

## 2023-08-14 RX ORDER — ACETAMINOPHEN 325 MG/1
650 TABLET ORAL
Status: CANCELLED | OUTPATIENT
Start: 2023-08-21

## 2023-08-14 RX ORDER — HEPARIN 100 UNIT/ML
500 SYRINGE INTRAVENOUS PRN
OUTPATIENT
Start: 2023-11-05

## 2023-08-14 RX ORDER — EPINEPHRINE 1 MG/ML
0.3 INJECTION, SOLUTION, CONCENTRATE INTRAVENOUS PRN
OUTPATIENT
Start: 2023-11-05

## 2023-08-14 RX ORDER — SODIUM CHLORIDE 9 MG/ML
5-250 INJECTION, SOLUTION INTRAVENOUS PRN
Status: CANCELLED | OUTPATIENT
Start: 2023-11-05

## 2023-08-14 RX ADMIN — SODIUM CHLORIDE 25 ML/HR: 9 INJECTION, SOLUTION INTRAVENOUS at 15:19

## 2023-08-14 RX ADMIN — ZOLEDRONIC ACID 3.5 MG: 4 INJECTION, SOLUTION, CONCENTRATE INTRAVENOUS at 15:22

## 2023-08-14 RX ADMIN — LEUPROLIDE ACETATE 3.75 MG: KIT at 15:15

## 2023-08-14 ASSESSMENT — PAIN SCALES - GENERAL: PAINLEVEL_OUTOF10: 0

## 2023-08-14 NOTE — PROGRESS NOTES
Kingsley Frank is a 48 y.o. female here for follow-up of breast cancer.
palliative. High risk of death without therapy. I am concerned about the weak ER + on her bone lesion and that her metastatic disease may not respond to endocrine therapy, though I do feel we should try with endocrine therapy first as her tumor is luminal B    Discussed:  lupron/goserelin + letrozole + ribociclib    We discussed ovarian suppression(such as monthly lupron 3.75 mg IM). Discussed side effects such as menopausal sx    The risks and benefits of aromatase inhibitors (anastrozole, letrozole, and exemestane) were discussed in detail and the patient was informed of the following: Risks include the development of painful muscles and joints (arthralgia/myalgia) and bone loss. Muscle and joint pain can be severe but rarely result in any tissue damage; symptoms usually resolve in several weeks when the medication is stopped. Bone loss is common and a bone density test is recommended as a baseline and then yearly to every several years depending on initial results. The risk of fractures is increased by a few percent in patients taking these drugs, but careful monitoring of bone density and using bone protecting agents when indicated can minimize these risks. Unlike tamoxifen there is no increased risk of blood clots or endometrial cancer. AIs can cause or worsen vaginal dryness. AIs can also cause or increase hot flashes. Any other symptoms should be reported. Discussed side effects of ribociclib including but not limited to myelosuppression, fatigue, mouth sores, nausea, long Qtc, rare ILD. After this discussion, she is agreeable to lupron/letrozole/palbociclib, she has signed informed consent. Will start lupron on 7/17/23 and ribo and letrozole after the 2nd inj. Zometa with 2nd inj. Rx ribociclib in    Pill diary given    7/12/23 Qtc:  402    Discussed zometa 4 mg IV q 3 months if + for bone mets.   We discussed side effects such as ONJ and the need to avoid invasive dental procedures while on these

## 2023-08-15 ENCOUNTER — CLINICAL DOCUMENTATION (OUTPATIENT)
Age: 53
End: 2023-08-15

## 2023-08-15 RX ORDER — LETROZOLE 2.5 MG/1
2.5 TABLET, FILM COATED ORAL DAILY
Qty: 30 TABLET | Refills: 3 | Status: SHIPPED | OUTPATIENT
Start: 2023-08-15

## 2023-08-15 NOTE — PROGRESS NOTES
Letrozole refill sent    For Pharmacy Admin Tracking Only    Program: Medical Group  CPA in place:  Yes  Recommendation Provided To: Pharmacy: 1  Intervention Detail: Refill(s) Provided  Intervention Accepted By: Pharmacy: 1  Time Spent (min): 5
- - -

## 2023-08-17 ENCOUNTER — CLINICAL DOCUMENTATION (OUTPATIENT)
Age: 53
End: 2023-08-17

## 2023-08-17 NOTE — PROGRESS NOTES
For Pharmacy Admin Tracking Only    Program: Medical Group  CPA in place:  Yes  Recommendation Provided To: Patient/Caregiver: 1 via Parabel Message  Intervention Detail: Adherence Monitorin  Intervention Accepted By: Patient/Caregiver: 1  Time Spent (min): 60

## 2023-08-22 ENCOUNTER — CLINICAL DOCUMENTATION (OUTPATIENT)
Age: 53
End: 2023-08-22

## 2023-08-22 NOTE — PROGRESS NOTES
For Pharmacy Admin Tracking Only    Program: Medical Group  CPA in place:  Yes  Recommendation Provided To: Patient/Caregiver: 1 via TagSeats Message  Intervention Detail: Adherence Monitorin  Intervention Accepted By: Patient/Caregiver: 1  Time Spent (min): 10

## 2023-08-29 DIAGNOSIS — Z17.0 MALIGNANT NEOPLASM OF CENTRAL PORTION OF LEFT BREAST IN FEMALE, ESTROGEN RECEPTOR POSITIVE (HCC): ICD-10-CM

## 2023-08-29 DIAGNOSIS — C50.112 MALIGNANT NEOPLASM OF CENTRAL PORTION OF LEFT BREAST IN FEMALE, ESTROGEN RECEPTOR POSITIVE (HCC): ICD-10-CM

## 2023-08-29 LAB
ALBUMIN SERPL-MCNC: 3.9 G/DL (ref 3.5–5)
ALBUMIN/GLOB SERPL: 1.2 (ref 1.1–2.2)
ALP SERPL-CCNC: 62 U/L (ref 45–117)
ALT SERPL-CCNC: 23 U/L (ref 12–78)
ANION GAP SERPL CALC-SCNC: 6 MMOL/L (ref 5–15)
AST SERPL-CCNC: 16 U/L (ref 15–37)
BASOPHILS # BLD: 0.1 K/UL (ref 0–0.1)
BASOPHILS NFR BLD: 2 % (ref 0–1)
BILIRUB SERPL-MCNC: 0.3 MG/DL (ref 0.2–1)
BUN SERPL-MCNC: 23 MG/DL (ref 6–20)
BUN/CREAT SERPL: 24 (ref 12–20)
CALCIUM SERPL-MCNC: 9.4 MG/DL (ref 8.5–10.1)
CHLORIDE SERPL-SCNC: 106 MMOL/L (ref 97–108)
CO2 SERPL-SCNC: 26 MMOL/L (ref 21–32)
CREAT SERPL-MCNC: 0.97 MG/DL (ref 0.55–1.02)
DIFFERENTIAL METHOD BLD: ABNORMAL
EOSINOPHIL # BLD: 0.1 K/UL (ref 0–0.4)
EOSINOPHIL NFR BLD: 3 % (ref 0–7)
ERYTHROCYTE [DISTWIDTH] IN BLOOD BY AUTOMATED COUNT: 11.9 % (ref 11.5–14.5)
GLOBULIN SER CALC-MCNC: 3.2 G/DL (ref 2–4)
GLUCOSE SERPL-MCNC: 117 MG/DL (ref 65–100)
HCT VFR BLD AUTO: 38.9 % (ref 35–47)
HGB BLD-MCNC: 12.6 G/DL (ref 11.5–16)
IMM GRANULOCYTES # BLD AUTO: 0 K/UL (ref 0–0.04)
IMM GRANULOCYTES NFR BLD AUTO: 0 % (ref 0–0.5)
LYMPHOCYTES # BLD: 1.7 K/UL (ref 0.8–3.5)
LYMPHOCYTES NFR BLD: 55 % (ref 12–49)
MCH RBC QN AUTO: 31.7 PG (ref 26–34)
MCHC RBC AUTO-ENTMCNC: 32.4 G/DL (ref 30–36.5)
MCV RBC AUTO: 97.7 FL (ref 80–99)
MONOCYTES # BLD: 0.3 K/UL (ref 0–1)
MONOCYTES NFR BLD: 8 % (ref 5–13)
NEUTS SEG # BLD: 1 K/UL (ref 1.8–8)
NEUTS SEG NFR BLD: 32 % (ref 32–75)
NRBC # BLD: 0 K/UL (ref 0–0.01)
NRBC BLD-RTO: 0 PER 100 WBC
PLATELET # BLD AUTO: 337 K/UL (ref 150–400)
PMV BLD AUTO: 9.5 FL (ref 8.9–12.9)
POTASSIUM SERPL-SCNC: 3.9 MMOL/L (ref 3.5–5.1)
PROT SERPL-MCNC: 7.1 G/DL (ref 6.4–8.2)
RBC # BLD AUTO: 3.98 M/UL (ref 3.8–5.2)
SODIUM SERPL-SCNC: 138 MMOL/L (ref 136–145)
WBC # BLD AUTO: 3.1 K/UL (ref 3.6–11)

## 2023-08-30 ENCOUNTER — OFFICE VISIT (OUTPATIENT)
Age: 53
End: 2023-08-30
Payer: COMMERCIAL

## 2023-08-30 VITALS
OXYGEN SATURATION: 99 % | TEMPERATURE: 98.4 F | SYSTOLIC BLOOD PRESSURE: 140 MMHG | BODY MASS INDEX: 20.27 KG/M2 | RESPIRATION RATE: 16 BRPM | WEIGHT: 97 LBS | DIASTOLIC BLOOD PRESSURE: 83 MMHG | HEART RATE: 60 BPM

## 2023-08-30 DIAGNOSIS — C50.112 MALIGNANT NEOPLASM OF CENTRAL PORTION OF LEFT BREAST IN FEMALE, ESTROGEN RECEPTOR POSITIVE (HCC): Primary | ICD-10-CM

## 2023-08-30 DIAGNOSIS — Z17.0 MALIGNANT NEOPLASM OF CENTRAL PORTION OF LEFT BREAST IN FEMALE, ESTROGEN RECEPTOR POSITIVE (HCC): Primary | ICD-10-CM

## 2023-08-30 PROCEDURE — 99215 OFFICE O/P EST HI 40 MIN: CPT | Performed by: INTERNAL MEDICINE

## 2023-08-30 NOTE — PROGRESS NOTES
Luly Cagle is a 48 y.o. female here today to follow up for breast cancer     1. Have you been to the ER, urgent care clinic since your last visit? Hospitalized since your last visit?no    2. Have you seen or consulted any other health care providers outside of the [address] [place] [address] since your last visit? Include any pap smears or colon screening.  no
node measures 7 mm. No pathologically enlarged  mediastinal lymph nodes. GABRIELLA: No mass or lymphadenopathy. THORACIC AORTA: No dissection or aneurysm. MAIN PULMONARY ARTERY: Normal in caliber. TRACHEA/BRONCHI: Patent. ESOPHAGUS: No wall thickening or dilatation. HEART: Normal in size. Coronary artery calcium: absent  PLEURA: No effusion or pneumothorax. LUNGS: 4 mm semisolid nodule in the superior segment of the right lower lobe  (3:22). 5 mm semisolid nodule in the posterior right lower lobe (3:34). 8 mm  semisolid nodule in the lingula (3:41). 7 x 6 mm nodule in the left lower lobe  (3:46). 5 mm solid nodule in the right lower lobe (3:43). LIVER: No mass. BILIARY TREE: Gallbladder is within normal limits. CBD is not dilated. SPLEEN: within normal limits. PANCREAS: No mass or ductal dilatation. ADRENALS: Unremarkable. KIDNEYS: No mass, calculus, or hydronephrosis. STOMACH: Unremarkable. SMALL BOWEL: No dilatation or wall thickening. COLON: No dilatation or wall thickening. APPENDIX: Normal  PERITONEUM: No ascites or pneumoperitoneum. RETROPERITONEUM: No lymphadenopathy or aortic aneurysm. REPRODUCTIVE ORGANS: Enhancing heterogeneous uterus suggestive of small fibroids  with at least one 1.6 cm fibroid in the right mid uterus. URINARY BLADDER: No mass or calculus. BONES: No destructive bone lesion. ABDOMINAL WALL: No mass or hernia. ADDITIONAL COMMENTS: N/A     IMPRESSION:  1.  Multiple lung nodules bilaterally some solid, some semisolid, concerning for  possible metastatic disease. 2.  Left retroareolar breast mass. 3.  No pathologically enlarged mediastinal, abdominal, or pelvic lymph nodes. 4.  Myomatous uterus. 6/8/23 MRI breast  RIGHT BREAST:  Background parenchymal enhancement: Mild   There is no suspicious masslike or nonmasslike enhancement within the right  breast to indicate breast carcinoma.  There is a subpectoral, saline implant     There are no suspicious internal mammary

## 2023-09-11 ENCOUNTER — OFFICE VISIT (OUTPATIENT)
Age: 53
End: 2023-09-11
Payer: COMMERCIAL

## 2023-09-11 ENCOUNTER — HOSPITAL ENCOUNTER (OUTPATIENT)
Facility: HOSPITAL | Age: 53
Setting detail: INFUSION SERIES
End: 2023-09-11
Payer: COMMERCIAL

## 2023-09-11 VITALS
OXYGEN SATURATION: 98 % | WEIGHT: 98.9 LBS | SYSTOLIC BLOOD PRESSURE: 137 MMHG | TEMPERATURE: 97.5 F | DIASTOLIC BLOOD PRESSURE: 71 MMHG | HEIGHT: 58 IN | HEART RATE: 65 BPM | BODY MASS INDEX: 20.76 KG/M2 | RESPIRATION RATE: 18 BRPM

## 2023-09-11 VITALS
BODY MASS INDEX: 20.48 KG/M2 | SYSTOLIC BLOOD PRESSURE: 137 MMHG | TEMPERATURE: 97.5 F | WEIGHT: 98 LBS | DIASTOLIC BLOOD PRESSURE: 71 MMHG | HEART RATE: 65 BPM | RESPIRATION RATE: 17 BRPM | OXYGEN SATURATION: 98 %

## 2023-09-11 DIAGNOSIS — C50.919 MALIGNANT NEOPLASM OF BREAST, STAGE 1, UNSPECIFIED ESTROGEN RECEPTOR STATUS, UNSPECIFIED LATERALITY (HCC): Primary | ICD-10-CM

## 2023-09-11 DIAGNOSIS — C50.112 MALIGNANT NEOPLASM OF CENTRAL PORTION OF LEFT BREAST IN FEMALE, ESTROGEN RECEPTOR POSITIVE (HCC): Primary | ICD-10-CM

## 2023-09-11 DIAGNOSIS — Z17.0 MALIGNANT NEOPLASM OF CENTRAL PORTION OF LEFT BREAST IN FEMALE, ESTROGEN RECEPTOR POSITIVE (HCC): Primary | ICD-10-CM

## 2023-09-11 LAB
ALBUMIN SERPL-MCNC: 3.4 G/DL (ref 3.5–5)
ALBUMIN/GLOB SERPL: 0.9 (ref 1.1–2.2)
ALP SERPL-CCNC: 74 U/L (ref 45–117)
ALT SERPL-CCNC: 22 U/L (ref 12–78)
ANION GAP SERPL CALC-SCNC: 6 MMOL/L (ref 5–15)
AST SERPL-CCNC: 15 U/L (ref 15–37)
BASOPHILS # BLD: 0.1 K/UL (ref 0–0.1)
BASOPHILS NFR BLD: 2 % (ref 0–1)
BILIRUB SERPL-MCNC: 0.2 MG/DL (ref 0.2–1)
BUN SERPL-MCNC: 21 MG/DL (ref 6–20)
BUN/CREAT SERPL: 24 (ref 12–20)
CALCIUM SERPL-MCNC: 9 MG/DL (ref 8.5–10.1)
CHLORIDE SERPL-SCNC: 108 MMOL/L (ref 97–108)
CO2 SERPL-SCNC: 25 MMOL/L (ref 21–32)
CREAT SERPL-MCNC: 0.88 MG/DL (ref 0.55–1.02)
DIFFERENTIAL METHOD BLD: ABNORMAL
EOSINOPHIL # BLD: 0.1 K/UL (ref 0–0.4)
EOSINOPHIL NFR BLD: 4 % (ref 0–7)
ERYTHROCYTE [DISTWIDTH] IN BLOOD BY AUTOMATED COUNT: 12.4 % (ref 11.5–14.5)
GLOBULIN SER CALC-MCNC: 3.8 G/DL (ref 2–4)
GLUCOSE SERPL-MCNC: 103 MG/DL (ref 65–100)
HCT VFR BLD AUTO: 38.6 % (ref 35–47)
HGB BLD-MCNC: 13.2 G/DL (ref 11.5–16)
IMM GRANULOCYTES # BLD AUTO: 0 K/UL (ref 0–0.04)
IMM GRANULOCYTES NFR BLD AUTO: 1 % (ref 0–0.5)
LYMPHOCYTES # BLD: 1.7 K/UL (ref 0.8–3.5)
LYMPHOCYTES NFR BLD: 41 % (ref 12–49)
MCH RBC QN AUTO: 32.2 PG (ref 26–34)
MCHC RBC AUTO-ENTMCNC: 34.2 G/DL (ref 30–36.5)
MCV RBC AUTO: 94.1 FL (ref 80–99)
MONOCYTES # BLD: 0.4 K/UL (ref 0–1)
MONOCYTES NFR BLD: 11 % (ref 5–13)
NEUTS SEG # BLD: 1.6 K/UL (ref 1.8–8)
NEUTS SEG NFR BLD: 41 % (ref 32–75)
NRBC # BLD: 0 K/UL (ref 0–0.01)
NRBC BLD-RTO: 0 PER 100 WBC
PLATELET # BLD AUTO: 266 K/UL (ref 150–400)
PMV BLD AUTO: 9.6 FL (ref 8.9–12.9)
POTASSIUM SERPL-SCNC: 3.7 MMOL/L (ref 3.5–5.1)
PROT SERPL-MCNC: 7.2 G/DL (ref 6.4–8.2)
RBC # BLD AUTO: 4.1 M/UL (ref 3.8–5.2)
SODIUM SERPL-SCNC: 139 MMOL/L (ref 136–145)
WBC # BLD AUTO: 3.9 K/UL (ref 3.6–11)

## 2023-09-11 PROCEDURE — 96402 CHEMO HORMON ANTINEOPL SQ/IM: CPT

## 2023-09-11 PROCEDURE — 85025 COMPLETE CBC W/AUTO DIFF WBC: CPT

## 2023-09-11 PROCEDURE — 99215 OFFICE O/P EST HI 40 MIN: CPT | Performed by: INTERNAL MEDICINE

## 2023-09-11 PROCEDURE — 36415 COLL VENOUS BLD VENIPUNCTURE: CPT

## 2023-09-11 PROCEDURE — 6360000002 HC RX W HCPCS: Performed by: INTERNAL MEDICINE

## 2023-09-11 PROCEDURE — 80053 COMPREHEN METABOLIC PANEL: CPT

## 2023-09-11 RX ORDER — ONDANSETRON 2 MG/ML
8 INJECTION INTRAMUSCULAR; INTRAVENOUS
Status: CANCELLED | OUTPATIENT
Start: 2023-09-18

## 2023-09-11 RX ORDER — ALBUTEROL SULFATE 90 UG/1
4 AEROSOL, METERED RESPIRATORY (INHALATION) PRN
Status: CANCELLED | OUTPATIENT
Start: 2023-09-18

## 2023-09-11 RX ORDER — DIPHENHYDRAMINE HYDROCHLORIDE 50 MG/ML
50 INJECTION INTRAMUSCULAR; INTRAVENOUS
Status: CANCELLED | OUTPATIENT
Start: 2023-09-18

## 2023-09-11 RX ORDER — SODIUM CHLORIDE 9 MG/ML
INJECTION, SOLUTION INTRAVENOUS CONTINUOUS
Status: CANCELLED | OUTPATIENT
Start: 2023-09-18

## 2023-09-11 RX ORDER — ACETAMINOPHEN 325 MG/1
650 TABLET ORAL
Status: CANCELLED | OUTPATIENT
Start: 2023-09-18

## 2023-09-11 RX ORDER — EPINEPHRINE 1 MG/ML
0.3 INJECTION, SOLUTION, CONCENTRATE INTRAVENOUS PRN
Status: CANCELLED | OUTPATIENT
Start: 2023-09-18

## 2023-09-11 RX ADMIN — LEUPROLIDE ACETATE 3.75 MG: KIT at 13:28

## 2023-09-11 ASSESSMENT — PAIN SCALES - GENERAL: PAINLEVEL_OUTOF10: 0

## 2023-09-11 NOTE — PROGRESS NOTES
Venkata Davidson is a 48 y.o. female here today to follow up for breast cancer     1. Have you been to the ER, urgent care clinic since your last visit? Hospitalized since your last visit?no    2. Have you seen or consulted any other health care providers outside of the [address] [place] [address] Avenue since your last visit? Include any pap smears or colon screening.  no
enhancement and extreme fibroglandular  tissue in the right breast.     Left breast:  Post nipple sparing mastectomy and prepectoral implant reconstruction. A 7 mm,  round, irregular mass at 11:00 in the left breast adjacent to the pectoralis  muscle and implant is new from 2016 and shows mixed washout and plateau  kinetics. No axillary or internal mammary chain lymphadenopathy. Right breast:  Post prepectoral saline implant. At 6:00 in the middle to posterior third, and  area of non-masslike, irregular enhancement is new from 2016 and measures 4 x 8  mm. It shows mixed plateau and persistent headaches. No axillary or internal  mammary chain lymphadenopathy. A summary portfolio with key images has been sent from kinetic analysis software  to PACS. IMPRESSION:  IMPRESSION:   1. Left breast mass with washout kinetics (7 mm). BI-RADS 4.  2. Right breast non-masslike enhancement (4 x 8 mm). BI-RADS 4.  3. Overall assessment: BI-RADS Assessment Category 4: Suspicious abnormality. 4. Recommendation: Bilateral second look ultrasound and ultrasound-guided  biopsy. MRI guided biopsy of these masses may not be possible with implants in  place. Records reviewed and summarized above. Pathology report(s) reviewed above. Radiology report(s) reviewed above. Assessment/plan:   1. Left breast recurrent cancer, ER +, NM negative, HER 2 negative, gr 1-2  rcT2 cN0 pM1, + metastatic disease  High risk mammprint    6/30/23 right clavicle bone lesion:  metastatic breast cancer, ER +, weakly 1-10%, NM negative, HER 2 negative at [facility] 0    Discussed that with her metastatic disease, this is treatable, but not curable. Goal of therapy is palliative. High risk of death without therapy.     I am concerned about the weak ER + on her bone lesion and that her metastatic disease may not respond to endocrine therapy, though I do feel we should try with endocrine therapy first as her tumor is luminal B    Discussed:

## 2023-10-05 ENCOUNTER — CLINICAL DOCUMENTATION (OUTPATIENT)
Age: 53
End: 2023-10-05

## 2023-10-05 NOTE — PROGRESS NOTES
For Pharmacy Admin Tracking Only    Program: Medication Management  CPA in place:  Yes  Recommendation Provided To: Patient/Caregiver: 1 via YuMe Message  Intervention Detail: Adherence Monitorin  Intervention Accepted By: Patient/Caregiver: 1  Time Spent (min): 30

## 2023-10-09 ENCOUNTER — OFFICE VISIT (OUTPATIENT)
Age: 53
End: 2023-10-09
Payer: COMMERCIAL

## 2023-10-09 ENCOUNTER — HOSPITAL ENCOUNTER (OUTPATIENT)
Facility: HOSPITAL | Age: 53
Setting detail: INFUSION SERIES
End: 2023-10-09
Payer: COMMERCIAL

## 2023-10-09 VITALS
DIASTOLIC BLOOD PRESSURE: 76 MMHG | WEIGHT: 99 LBS | SYSTOLIC BLOOD PRESSURE: 115 MMHG | TEMPERATURE: 97.9 F | BODY MASS INDEX: 20.69 KG/M2 | OXYGEN SATURATION: 98 % | RESPIRATION RATE: 16 BRPM | HEART RATE: 64 BPM

## 2023-10-09 VITALS
WEIGHT: 99 LBS | DIASTOLIC BLOOD PRESSURE: 76 MMHG | HEART RATE: 64 BPM | RESPIRATION RATE: 18 BRPM | BODY MASS INDEX: 20.69 KG/M2 | TEMPERATURE: 97.9 F | SYSTOLIC BLOOD PRESSURE: 115 MMHG

## 2023-10-09 DIAGNOSIS — Z17.0 MALIGNANT NEOPLASM OF CENTRAL PORTION OF LEFT BREAST IN FEMALE, ESTROGEN RECEPTOR POSITIVE (HCC): Primary | ICD-10-CM

## 2023-10-09 DIAGNOSIS — C50.919 MALIGNANT NEOPLASM OF BREAST, STAGE 1, UNSPECIFIED ESTROGEN RECEPTOR STATUS, UNSPECIFIED LATERALITY (HCC): Primary | ICD-10-CM

## 2023-10-09 DIAGNOSIS — C50.112 MALIGNANT NEOPLASM OF CENTRAL PORTION OF LEFT BREAST IN FEMALE, ESTROGEN RECEPTOR POSITIVE (HCC): Primary | ICD-10-CM

## 2023-10-09 LAB
ALBUMIN SERPL-MCNC: 3.6 G/DL (ref 3.5–5)
ALBUMIN/GLOB SERPL: 0.9 (ref 1.1–2.2)
ALP SERPL-CCNC: 72 U/L (ref 45–117)
ALT SERPL-CCNC: 24 U/L (ref 12–78)
ANION GAP SERPL CALC-SCNC: 2 MMOL/L (ref 5–15)
AST SERPL-CCNC: 18 U/L (ref 15–37)
BASOPHILS # BLD: 0.1 K/UL (ref 0–0.1)
BASOPHILS NFR BLD: 2 % (ref 0–1)
BILIRUB SERPL-MCNC: 0.2 MG/DL (ref 0.2–1)
BUN SERPL-MCNC: 20 MG/DL (ref 6–20)
BUN/CREAT SERPL: 21 (ref 12–20)
CALCIUM SERPL-MCNC: 9 MG/DL (ref 8.5–10.1)
CHLORIDE SERPL-SCNC: 109 MMOL/L (ref 97–108)
CO2 SERPL-SCNC: 29 MMOL/L (ref 21–32)
CREAT SERPL-MCNC: 0.95 MG/DL (ref 0.55–1.02)
DIFFERENTIAL METHOD BLD: ABNORMAL
EOSINOPHIL # BLD: 0.2 K/UL (ref 0–0.4)
EOSINOPHIL NFR BLD: 5 % (ref 0–7)
ERYTHROCYTE [DISTWIDTH] IN BLOOD BY AUTOMATED COUNT: 12.9 % (ref 11.5–14.5)
GLOBULIN SER CALC-MCNC: 3.8 G/DL (ref 2–4)
GLUCOSE SERPL-MCNC: 166 MG/DL (ref 65–100)
HCT VFR BLD AUTO: 39.8 % (ref 35–47)
HGB BLD-MCNC: 13.4 G/DL (ref 11.5–16)
IMM GRANULOCYTES # BLD AUTO: 0 K/UL (ref 0–0.04)
IMM GRANULOCYTES NFR BLD AUTO: 0 % (ref 0–0.5)
LYMPHOCYTES # BLD: 1.9 K/UL (ref 0.8–3.5)
LYMPHOCYTES NFR BLD: 46 % (ref 12–49)
MCH RBC QN AUTO: 32.1 PG (ref 26–34)
MCHC RBC AUTO-ENTMCNC: 33.7 G/DL (ref 30–36.5)
MCV RBC AUTO: 95.4 FL (ref 80–99)
MONOCYTES # BLD: 0.4 K/UL (ref 0–1)
MONOCYTES NFR BLD: 9 % (ref 5–13)
NEUTS SEG # BLD: 1.5 K/UL (ref 1.8–8)
NEUTS SEG NFR BLD: 38 % (ref 32–75)
NRBC # BLD: 0 K/UL (ref 0–0.01)
NRBC BLD-RTO: 0 PER 100 WBC
PLATELET # BLD AUTO: 289 K/UL (ref 150–400)
PMV BLD AUTO: 9.3 FL (ref 8.9–12.9)
POTASSIUM SERPL-SCNC: 3.6 MMOL/L (ref 3.5–5.1)
PROT SERPL-MCNC: 7.4 G/DL (ref 6.4–8.2)
RBC # BLD AUTO: 4.17 M/UL (ref 3.8–5.2)
SODIUM SERPL-SCNC: 140 MMOL/L (ref 136–145)
WBC # BLD AUTO: 4 K/UL (ref 3.6–11)

## 2023-10-09 PROCEDURE — 80053 COMPREHEN METABOLIC PANEL: CPT

## 2023-10-09 PROCEDURE — 85025 COMPLETE CBC W/AUTO DIFF WBC: CPT

## 2023-10-09 PROCEDURE — 96402 CHEMO HORMON ANTINEOPL SQ/IM: CPT

## 2023-10-09 PROCEDURE — 6360000002 HC RX W HCPCS: Performed by: INTERNAL MEDICINE

## 2023-10-09 PROCEDURE — 36415 COLL VENOUS BLD VENIPUNCTURE: CPT

## 2023-10-09 PROCEDURE — 99215 OFFICE O/P EST HI 40 MIN: CPT | Performed by: INTERNAL MEDICINE

## 2023-10-09 RX ORDER — ACETAMINOPHEN 325 MG/1
650 TABLET ORAL
OUTPATIENT
Start: 2023-10-16

## 2023-10-09 RX ORDER — CHLORAL HYDRATE 500 MG
CAPSULE ORAL DAILY
COMMUNITY

## 2023-10-09 RX ORDER — ALBUTEROL SULFATE 90 UG/1
4 AEROSOL, METERED RESPIRATORY (INHALATION) PRN
OUTPATIENT
Start: 2023-10-16

## 2023-10-09 RX ORDER — SODIUM CHLORIDE 9 MG/ML
INJECTION, SOLUTION INTRAVENOUS CONTINUOUS
OUTPATIENT
Start: 2023-10-16

## 2023-10-09 RX ORDER — EPINEPHRINE 1 MG/ML
0.3 INJECTION, SOLUTION, CONCENTRATE INTRAVENOUS PRN
OUTPATIENT
Start: 2023-10-16

## 2023-10-09 RX ORDER — DIPHENHYDRAMINE HYDROCHLORIDE 50 MG/ML
50 INJECTION INTRAMUSCULAR; INTRAVENOUS
OUTPATIENT
Start: 2023-10-16

## 2023-10-09 RX ORDER — ONDANSETRON 2 MG/ML
8 INJECTION INTRAMUSCULAR; INTRAVENOUS
OUTPATIENT
Start: 2023-10-16

## 2023-10-09 RX ADMIN — LEUPROLIDE ACETATE 3.75 MG: KIT at 13:20

## 2023-10-09 ASSESSMENT — PAIN SCALES - GENERAL: PAINLEVEL_OUTOF10: 0

## 2023-10-09 NOTE — PROGRESS NOTES
Simon Rolle is a 48 y.o. female here today to follow up for breast cancer    1. Have you been to the ER, urgent care clinic since your last visit? Hospitalized since your last visit?no    2. Have you seen or consulted any other health care providers outside of the [address] [place] [address] since your last visit? Include any pap smears or colon screening.  no
death without therapy. I am concerned about the weak ER + on her bone lesion and that her metastatic disease may not respond to endocrine therapy, though I do feel we should try with endocrine therapy first as her tumor is luminal B    Discussed:  lupron/goserelin + letrozole + ribociclib    We discussed ovarian suppression(such as monthly lupron 3.75 mg IM). Discussed side effects such as menopausal sx    The risks and benefits of aromatase inhibitors (anastrozole, letrozole, and exemestane) were discussed in detail and the patient was informed of the following: Risks include the development of painful muscles and joints (arthralgia/myalgia) and bone loss. Muscle and joint pain can be severe but rarely result in any tissue damage; symptoms usually resolve in several weeks when the medication is stopped. Bone loss is common and a bone density test is recommended as a baseline and then yearly to every several years depending on initial results. The risk of fractures is increased by a few percent in patients taking these drugs, but careful monitoring of bone density and using bone protecting agents when indicated can minimize these risks. Unlike tamoxifen there is no increased risk of blood clots or endometrial cancer. AIs can cause or worsen vaginal dryness. AIs can also cause or increase hot flashes. Any other symptoms should be reported. Discussed side effects of ribociclib including but not limited to myelosuppression, fatigue, mouth sores, nausea, long Qtc, rare ILD. After this discussion, she is agreeable to lupron/letrozole/palbociclib, she has signed informed consent. Started lupron on 7/17/23 and ribo and letrozole after the 2nd inj. Zometa with 2nd inj. Rx ribociclib in    Pill diary given    7/12/23 Qtc:  402; 8/30/23 QTcF 447; 9/11/23:  QTcF 425    Discussed zometa 4 mg IV q 3 months if + for bone mets.   We discussed side effects such as ONJ and the need to avoid invasive dental procedures while
on these medications, renal damage, and hypocalcemia. Started on 8/14/23    Discussed that if metastatic disease, life expectancy with no therapy would be 6 months to a year. With therapy, could be many years, with preservation of QOL. QOL of life would be severely impacted by progressive metastatic disease. She is opposed to chemotherapy at this time. It would be reasonable though with the receptors on her bone bx for chemotherapy. Not able to perform PD-L1 on bone bx due to scant material.    If we did chemotherapy, would recommend paclitaxel 80 mg/m2 on d1, 8, 15 of 28 d cycle IV. I feel it is warranted to attempt to maintain a response with endocrine therapy + CK4/6 inhibitor first. She has started on letrozole and ribociclib     Due to restart ribo on 10/10/23, will do so    Recommend sending guardant 360 testing -- no detectable ctDNA    We will plan to see the patient in follow up at least once per cycle, or sooner if symptoms warrant. Labs with each cycle for intensive monitoring for toxicity    The duration of this treatment plan will be until progression, intolerable side effects, or patient choice. Scans ordered prior to next visit      2. 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 Pain and fever with zometa:  advised advil prior to next time    5. Neutropenia:  due to ribo, mild      I appreciate the opportunity to participate in Ms. Lyle Schuler's care. Signed By: Steve Hewitt MD      No follow-ups on file.

## 2023-10-19 ENCOUNTER — TELEPHONE (OUTPATIENT)
Age: 53
End: 2023-10-19

## 2023-10-19 NOTE — TELEPHONE ENCOUNTER
Luis from [facility] pharmacy called and stated that a prior authorization for the patients kisquali medication. Jerry can be called to start it.        [facility] # [phone]

## 2023-10-27 RX ORDER — LETROZOLE 2.5 MG/1
2.5 TABLET, FILM COATED ORAL DAILY
Qty: 90 TABLET | Refills: 3 | Status: SHIPPED | OUTPATIENT
Start: 2023-10-27

## 2023-10-30 RX ORDER — SODIUM CHLORIDE 9 MG/ML
5-250 INJECTION, SOLUTION INTRAVENOUS PRN
Status: CANCELLED | OUTPATIENT
Start: 2023-11-06

## 2023-10-30 RX ORDER — ZOLEDRONIC ACID 0.04 MG/ML
4 INJECTION, SOLUTION INTRAVENOUS ONCE
Status: CANCELLED | OUTPATIENT
Start: 2023-11-06 | End: 2023-11-06

## 2023-11-02 ENCOUNTER — HOSPITAL ENCOUNTER (OUTPATIENT)
Facility: HOSPITAL | Age: 53
End: 2023-11-02
Attending: INTERNAL MEDICINE
Payer: COMMERCIAL

## 2023-11-02 ENCOUNTER — HOSPITAL ENCOUNTER (OUTPATIENT)
Facility: HOSPITAL | Age: 53
Discharge: HOME | End: 2023-11-02
Attending: INTERNAL MEDICINE
Payer: COMMERCIAL

## 2023-11-02 DIAGNOSIS — Z17.0 MALIGNANT NEOPLASM OF CENTRAL PORTION OF LEFT BREAST IN FEMALE, ESTROGEN RECEPTOR POSITIVE (HCC): ICD-10-CM

## 2023-11-02 DIAGNOSIS — C50.112 MALIGNANT NEOPLASM OF CENTRAL PORTION OF LEFT BREAST IN FEMALE, ESTROGEN RECEPTOR POSITIVE (HCC): ICD-10-CM

## 2023-11-02 PROCEDURE — 78306 BONE IMAGING WHOLE BODY: CPT | Performed by: INTERNAL MEDICINE

## 2023-11-02 PROCEDURE — 71260 CT THORAX DX C+: CPT

## 2023-11-02 PROCEDURE — 6360000004 HC RX CONTRAST MEDICATION: Performed by: INTERNAL MEDICINE

## 2023-11-02 PROCEDURE — A9503 TC99M MEDRONATE: HCPCS | Performed by: INTERNAL MEDICINE

## 2023-11-02 PROCEDURE — 3430000000 HC RX DIAGNOSTIC RADIOPHARMACEUTICAL: Performed by: INTERNAL MEDICINE

## 2023-11-02 RX ORDER — TC 99M MEDRONATE 20 MG/10ML
24 INJECTION, POWDER, LYOPHILIZED, FOR SOLUTION INTRAVENOUS
Status: COMPLETED | OUTPATIENT
Start: 2023-11-02 | End: 2023-11-02

## 2023-11-02 RX ADMIN — TC 99M MEDRONATE 24 MILLICURIE: 20 INJECTION, POWDER, LYOPHILIZED, FOR SOLUTION INTRAVENOUS at 07:45

## 2023-11-02 RX ADMIN — IOPAMIDOL 100 ML: 755 INJECTION, SOLUTION INTRAVENOUS at 07:58

## 2023-11-06 ENCOUNTER — HOSPITAL ENCOUNTER (OUTPATIENT)
Facility: HOSPITAL | Age: 53
Setting detail: INFUSION SERIES
End: 2023-11-06
Payer: COMMERCIAL

## 2023-11-06 ENCOUNTER — OFFICE VISIT (OUTPATIENT)
Age: 53
End: 2023-11-06
Payer: COMMERCIAL

## 2023-11-06 ENCOUNTER — HOSPITAL ENCOUNTER (OUTPATIENT)
Facility: HOSPITAL | Age: 53
Setting detail: INFUSION SERIES
Discharge: HOME | End: 2023-11-06
Payer: COMMERCIAL

## 2023-11-06 VITALS
BODY MASS INDEX: 20.87 KG/M2 | RESPIRATION RATE: 16 BRPM | OXYGEN SATURATION: 97 % | WEIGHT: 99.4 LBS | DIASTOLIC BLOOD PRESSURE: 64 MMHG | HEIGHT: 58 IN | SYSTOLIC BLOOD PRESSURE: 119 MMHG | HEART RATE: 59 BPM | TEMPERATURE: 98.2 F

## 2023-11-06 VITALS
BODY MASS INDEX: 20.69 KG/M2 | RESPIRATION RATE: 16 BRPM | HEART RATE: 59 BPM | SYSTOLIC BLOOD PRESSURE: 119 MMHG | OXYGEN SATURATION: 97 % | WEIGHT: 99 LBS | DIASTOLIC BLOOD PRESSURE: 64 MMHG | TEMPERATURE: 98 F

## 2023-11-06 DIAGNOSIS — C50.919 MALIGNANT NEOPLASM OF BREAST, STAGE 1, UNSPECIFIED ESTROGEN RECEPTOR STATUS, UNSPECIFIED LATERALITY (HCC): Primary | ICD-10-CM

## 2023-11-06 DIAGNOSIS — Z17.0 MALIGNANT NEOPLASM OF CENTRAL PORTION OF LEFT BREAST IN FEMALE, ESTROGEN RECEPTOR POSITIVE (HCC): Primary | ICD-10-CM

## 2023-11-06 DIAGNOSIS — C50.112 MALIGNANT NEOPLASM OF CENTRAL PORTION OF LEFT BREAST IN FEMALE, ESTROGEN RECEPTOR POSITIVE (HCC): Primary | ICD-10-CM

## 2023-11-06 LAB
ALBUMIN SERPL-MCNC: 3.6 G/DL (ref 3.5–5)
ALBUMIN/GLOB SERPL: 1 (ref 1.1–2.2)
ALP SERPL-CCNC: 65 U/L (ref 45–117)
ALT SERPL-CCNC: 28 U/L (ref 12–78)
ANION GAP SERPL CALC-SCNC: 5 MMOL/L (ref 5–15)
AST SERPL-CCNC: 18 U/L (ref 15–37)
BASOPHILS # BLD: 0.1 K/UL (ref 0–0.1)
BASOPHILS NFR BLD: 1 % (ref 0–1)
BILIRUB SERPL-MCNC: 0.2 MG/DL (ref 0.2–1)
BUN SERPL-MCNC: 24 MG/DL (ref 6–20)
BUN/CREAT SERPL: 35 (ref 12–20)
CALCIUM SERPL-MCNC: 9.1 MG/DL (ref 8.5–10.1)
CHLORIDE SERPL-SCNC: 111 MMOL/L (ref 97–108)
CO2 SERPL-SCNC: 24 MMOL/L (ref 21–32)
CREAT SERPL-MCNC: 0.68 MG/DL (ref 0.55–1.02)
DIFFERENTIAL METHOD BLD: NORMAL
EOSINOPHIL # BLD: 0.1 K/UL (ref 0–0.4)
EOSINOPHIL NFR BLD: 3 % (ref 0–7)
ERYTHROCYTE [DISTWIDTH] IN BLOOD BY AUTOMATED COUNT: 13 % (ref 11.5–14.5)
GLOBULIN SER CALC-MCNC: 3.6 G/DL (ref 2–4)
GLUCOSE SERPL-MCNC: 106 MG/DL (ref 65–100)
HCT VFR BLD AUTO: 38.8 % (ref 35–47)
HGB BLD-MCNC: 13 G/DL (ref 11.5–16)
IMM GRANULOCYTES # BLD AUTO: 0 K/UL (ref 0–0.04)
IMM GRANULOCYTES NFR BLD AUTO: 0 % (ref 0–0.5)
LYMPHOCYTES # BLD: 1.8 K/UL (ref 0.8–3.5)
LYMPHOCYTES NFR BLD: 41 % (ref 12–49)
MCH RBC QN AUTO: 32.3 PG (ref 26–34)
MCHC RBC AUTO-ENTMCNC: 33.5 G/DL (ref 30–36.5)
MCV RBC AUTO: 96.5 FL (ref 80–99)
MONOCYTES # BLD: 0.4 K/UL (ref 0–1)
MONOCYTES NFR BLD: 10 % (ref 5–13)
NEUTS SEG # BLD: 2 K/UL (ref 1.8–8)
NEUTS SEG NFR BLD: 45 % (ref 32–75)
NRBC # BLD: 0 K/UL (ref 0–0.01)
NRBC BLD-RTO: 0 PER 100 WBC
PLATELET # BLD AUTO: 286 K/UL (ref 150–400)
PMV BLD AUTO: 9.6 FL (ref 8.9–12.9)
POTASSIUM SERPL-SCNC: 4 MMOL/L (ref 3.5–5.1)
PROT SERPL-MCNC: 7.2 G/DL (ref 6.4–8.2)
RBC # BLD AUTO: 4.02 M/UL (ref 3.8–5.2)
SODIUM SERPL-SCNC: 140 MMOL/L (ref 136–145)
WBC # BLD AUTO: 4.4 K/UL (ref 3.6–11)

## 2023-11-06 PROCEDURE — 96374 THER/PROPH/DIAG INJ IV PUSH: CPT

## 2023-11-06 PROCEDURE — 36415 COLL VENOUS BLD VENIPUNCTURE: CPT

## 2023-11-06 PROCEDURE — 96375 TX/PRO/DX INJ NEW DRUG ADDON: CPT

## 2023-11-06 PROCEDURE — 85025 COMPLETE CBC W/AUTO DIFF WBC: CPT

## 2023-11-06 PROCEDURE — 99215 OFFICE O/P EST HI 40 MIN: CPT | Performed by: INTERNAL MEDICINE

## 2023-11-06 PROCEDURE — 2580000003 HC RX 258: Performed by: INTERNAL MEDICINE

## 2023-11-06 PROCEDURE — 6360000002 HC RX W HCPCS: Performed by: INTERNAL MEDICINE

## 2023-11-06 PROCEDURE — 96365 THER/PROPH/DIAG IV INF INIT: CPT

## 2023-11-06 PROCEDURE — 80053 COMPREHEN METABOLIC PANEL: CPT

## 2023-11-06 PROCEDURE — 96402 CHEMO HORMON ANTINEOPL SQ/IM: CPT

## 2023-11-06 RX ORDER — EPINEPHRINE 1 MG/ML
0.3 INJECTION, SOLUTION, CONCENTRATE INTRAVENOUS PRN
OUTPATIENT
Start: 2024-01-28

## 2023-11-06 RX ORDER — SODIUM CHLORIDE 9 MG/ML
INJECTION, SOLUTION INTRAVENOUS CONTINUOUS
OUTPATIENT
Start: 2023-12-04

## 2023-11-06 RX ORDER — ALBUTEROL SULFATE 90 UG/1
4 AEROSOL, METERED RESPIRATORY (INHALATION) PRN
OUTPATIENT
Start: 2024-01-28

## 2023-11-06 RX ORDER — ALBUTEROL SULFATE 90 UG/1
4 AEROSOL, METERED RESPIRATORY (INHALATION) PRN
OUTPATIENT
Start: 2023-12-04

## 2023-11-06 RX ORDER — DIPHENHYDRAMINE HYDROCHLORIDE 50 MG/ML
50 INJECTION INTRAMUSCULAR; INTRAVENOUS
OUTPATIENT
Start: 2024-01-28

## 2023-11-06 RX ORDER — SODIUM CHLORIDE 9 MG/ML
5-250 INJECTION, SOLUTION INTRAVENOUS PRN
Status: DISCONTINUED | OUTPATIENT
Start: 2023-11-06 | End: 2023-11-07 | Stop reason: HOSPADM

## 2023-11-06 RX ORDER — SODIUM CHLORIDE 0.9 % (FLUSH) 0.9 %
5-40 SYRINGE (ML) INJECTION PRN
OUTPATIENT
Start: 2024-01-28

## 2023-11-06 RX ORDER — ACETAMINOPHEN 325 MG/1
650 TABLET ORAL
OUTPATIENT
Start: 2023-12-04

## 2023-11-06 RX ORDER — ZOLEDRONIC ACID 0.04 MG/ML
4 INJECTION, SOLUTION INTRAVENOUS ONCE
Status: COMPLETED | OUTPATIENT
Start: 2023-11-06 | End: 2023-11-06

## 2023-11-06 RX ORDER — SODIUM CHLORIDE 9 MG/ML
INJECTION, SOLUTION INTRAVENOUS CONTINUOUS
OUTPATIENT
Start: 2024-01-28

## 2023-11-06 RX ORDER — ONDANSETRON 2 MG/ML
8 INJECTION INTRAMUSCULAR; INTRAVENOUS
OUTPATIENT
Start: 2023-12-04

## 2023-11-06 RX ORDER — ZOLEDRONIC ACID 0.04 MG/ML
4 INJECTION, SOLUTION INTRAVENOUS ONCE
OUTPATIENT
Start: 2024-01-28 | End: 2024-01-28

## 2023-11-06 RX ORDER — ACETAMINOPHEN 325 MG/1
650 TABLET ORAL
OUTPATIENT
Start: 2024-01-28

## 2023-11-06 RX ORDER — DIPHENHYDRAMINE HYDROCHLORIDE 50 MG/ML
50 INJECTION INTRAMUSCULAR; INTRAVENOUS
OUTPATIENT
Start: 2023-12-04

## 2023-11-06 RX ORDER — ONDANSETRON 2 MG/ML
8 INJECTION INTRAMUSCULAR; INTRAVENOUS
OUTPATIENT
Start: 2024-01-28

## 2023-11-06 RX ORDER — EPINEPHRINE 1 MG/ML
0.3 INJECTION, SOLUTION, CONCENTRATE INTRAVENOUS PRN
OUTPATIENT
Start: 2023-12-04

## 2023-11-06 RX ORDER — HEPARIN 100 UNIT/ML
500 SYRINGE INTRAVENOUS PRN
OUTPATIENT
Start: 2024-01-28

## 2023-11-06 RX ORDER — SODIUM CHLORIDE 9 MG/ML
5-250 INJECTION, SOLUTION INTRAVENOUS PRN
OUTPATIENT
Start: 2024-01-28

## 2023-11-06 RX ADMIN — SODIUM CHLORIDE 25 ML/HR: 9 INJECTION, SOLUTION INTRAVENOUS at 12:56

## 2023-11-06 RX ADMIN — ZOLEDRONIC ACID 4 MG: 0.04 INJECTION, SOLUTION INTRAVENOUS at 13:00

## 2023-11-06 RX ADMIN — LEUPROLIDE ACETATE 3.75 MG: KIT at 13:25

## 2023-11-06 ASSESSMENT — PAIN SCALES - GENERAL: PAINLEVEL_OUTOF10: 0

## 2023-11-06 NOTE — PROGRESS NOTES
Salbador Mcclellan is a 48 y.o. female here today to follow up for breast cancer      1. Have you been to the ER, urgent care clinic since your last visit? Hospitalized since your last visit?no    2. Have you seen or consulted any other health care providers outside of the [address] [place] [address] since your last visit? Include any pap smears or colon screening.  no
cm. Prominent left axillary lymph node measuring 8 mm. No subpectoral  lymphadenopathy. THYROID: No nodule. MEDIASTINUM: Precarinal lymph node measures 7 mm. No pathologically enlarged  mediastinal lymph nodes. GABRIELLA: No mass or lymphadenopathy. THORACIC AORTA: No dissection or aneurysm. MAIN PULMONARY ARTERY: Normal in caliber. TRACHEA/BRONCHI: Patent. ESOPHAGUS: No wall thickening or dilatation. HEART: Normal in size. Coronary artery calcium: absent  PLEURA: No effusion or pneumothorax. LUNGS: 4 mm semisolid nodule in the superior segment of the right lower lobe  (3:22). 5 mm semisolid nodule in the posterior right lower lobe (3:34). 8 mm  semisolid nodule in the lingula (3:41). 7 x 6 mm nodule in the left lower lobe  (3:46). 5 mm solid nodule in the right lower lobe (3:43). LIVER: No mass. BILIARY TREE: Gallbladder is within normal limits. CBD is not dilated. SPLEEN: within normal limits. PANCREAS: No mass or ductal dilatation. ADRENALS: Unremarkable. KIDNEYS: No mass, calculus, or hydronephrosis. STOMACH: Unremarkable. SMALL BOWEL: No dilatation or wall thickening. COLON: No dilatation or wall thickening. APPENDIX: Normal  PERITONEUM: No ascites or pneumoperitoneum. RETROPERITONEUM: No lymphadenopathy or aortic aneurysm. REPRODUCTIVE ORGANS: Enhancing heterogeneous uterus suggestive of small fibroids  with at least one 1.6 cm fibroid in the right mid uterus. URINARY BLADDER: No mass or calculus. BONES: No destructive bone lesion. ABDOMINAL WALL: No mass or hernia. ADDITIONAL COMMENTS: N/A     IMPRESSION:  1.  Multiple lung nodules bilaterally some solid, some semisolid, concerning for  possible metastatic disease. 2.  Left retroareolar breast mass. 3.  No pathologically enlarged mediastinal, abdominal, or pelvic lymph nodes. 4.  Myomatous uterus.     6/8/23 MRI breast  RIGHT BREAST:  Background parenchymal enhancement: Mild   There is no suspicious masslike or nonmasslike enhancement

## 2023-12-04 ENCOUNTER — HOSPITAL ENCOUNTER (OUTPATIENT)
Facility: HOSPITAL | Age: 53
Setting detail: INFUSION SERIES
Discharge: HOME | End: 2023-12-04
Payer: COMMERCIAL

## 2023-12-04 ENCOUNTER — OFFICE VISIT (OUTPATIENT)
Age: 53
End: 2023-12-04
Payer: COMMERCIAL

## 2023-12-04 VITALS
SYSTOLIC BLOOD PRESSURE: 139 MMHG | TEMPERATURE: 97.8 F | DIASTOLIC BLOOD PRESSURE: 82 MMHG | BODY MASS INDEX: 20.86 KG/M2 | OXYGEN SATURATION: 98 % | WEIGHT: 99.8 LBS | RESPIRATION RATE: 16 BRPM | HEART RATE: 66 BPM

## 2023-12-04 VITALS
BODY MASS INDEX: 20.69 KG/M2 | TEMPERATURE: 97.8 F | RESPIRATION RATE: 16 BRPM | HEART RATE: 66 BPM | WEIGHT: 99 LBS | SYSTOLIC BLOOD PRESSURE: 139 MMHG | DIASTOLIC BLOOD PRESSURE: 82 MMHG | OXYGEN SATURATION: 98 %

## 2023-12-04 DIAGNOSIS — C50.112 MALIGNANT NEOPLASM OF CENTRAL PORTION OF LEFT BREAST IN FEMALE, ESTROGEN RECEPTOR POSITIVE (HCC): Primary | ICD-10-CM

## 2023-12-04 DIAGNOSIS — C50.919 MALIGNANT NEOPLASM OF BREAST, STAGE 1, UNSPECIFIED ESTROGEN RECEPTOR STATUS, UNSPECIFIED LATERALITY (HCC): Primary | ICD-10-CM

## 2023-12-04 DIAGNOSIS — Z17.0 MALIGNANT NEOPLASM OF CENTRAL PORTION OF LEFT BREAST IN FEMALE, ESTROGEN RECEPTOR POSITIVE (HCC): Primary | ICD-10-CM

## 2023-12-04 LAB
ALBUMIN SERPL-MCNC: 3.8 G/DL (ref 3.5–5)
ALBUMIN/GLOB SERPL: 1 (ref 1.1–2.2)
ALP SERPL-CCNC: 54 U/L (ref 45–117)
ALT SERPL-CCNC: 22 U/L (ref 12–78)
ANION GAP SERPL CALC-SCNC: 2 MMOL/L (ref 5–15)
AST SERPL-CCNC: 19 U/L (ref 15–37)
BASOPHILS # BLD: 0.1 K/UL (ref 0–0.1)
BASOPHILS NFR BLD: 2 % (ref 0–1)
BILIRUB SERPL-MCNC: 0.5 MG/DL (ref 0.2–1)
BUN SERPL-MCNC: 19 MG/DL (ref 6–20)
BUN/CREAT SERPL: 22 (ref 12–20)
CALCIUM SERPL-MCNC: 8.6 MG/DL (ref 8.5–10.1)
CHLORIDE SERPL-SCNC: 107 MMOL/L (ref 97–108)
CO2 SERPL-SCNC: 27 MMOL/L (ref 21–32)
CREAT SERPL-MCNC: 0.86 MG/DL (ref 0.55–1.02)
DIFFERENTIAL METHOD BLD: ABNORMAL
EOSINOPHIL # BLD: 0.1 K/UL (ref 0–0.4)
EOSINOPHIL NFR BLD: 2 % (ref 0–7)
ERYTHROCYTE [DISTWIDTH] IN BLOOD BY AUTOMATED COUNT: 12 % (ref 11.5–14.5)
GLOBULIN SER CALC-MCNC: 3.7 G/DL (ref 2–4)
GLUCOSE SERPL-MCNC: 114 MG/DL (ref 65–100)
HCT VFR BLD AUTO: 39.4 % (ref 35–47)
HGB BLD-MCNC: 13.4 G/DL (ref 11.5–16)
IMM GRANULOCYTES # BLD AUTO: 0 K/UL (ref 0–0.04)
IMM GRANULOCYTES NFR BLD AUTO: 0 % (ref 0–0.5)
LYMPHOCYTES # BLD: 1.8 K/UL (ref 0.8–3.5)
LYMPHOCYTES NFR BLD: 44 % (ref 12–49)
MCH RBC QN AUTO: 32.8 PG (ref 26–34)
MCHC RBC AUTO-ENTMCNC: 34 G/DL (ref 30–36.5)
MCV RBC AUTO: 96.6 FL (ref 80–99)
MONOCYTES # BLD: 0.4 K/UL (ref 0–1)
MONOCYTES NFR BLD: 11 % (ref 5–13)
NEUTS SEG # BLD: 1.6 K/UL (ref 1.8–8)
NEUTS SEG NFR BLD: 41 % (ref 32–75)
NRBC # BLD: 0 K/UL (ref 0–0.01)
NRBC BLD-RTO: 0 PER 100 WBC
PLATELET # BLD AUTO: 267 K/UL (ref 150–400)
PMV BLD AUTO: 9.5 FL (ref 8.9–12.9)
POTASSIUM SERPL-SCNC: 3.8 MMOL/L (ref 3.5–5.1)
PROT SERPL-MCNC: 7.5 G/DL (ref 6.4–8.2)
RBC # BLD AUTO: 4.08 M/UL (ref 3.8–5.2)
SODIUM SERPL-SCNC: 136 MMOL/L (ref 136–145)
WBC # BLD AUTO: 4.1 K/UL (ref 3.6–11)

## 2023-12-04 PROCEDURE — 99215 OFFICE O/P EST HI 40 MIN: CPT | Performed by: INTERNAL MEDICINE

## 2023-12-04 PROCEDURE — 85025 COMPLETE CBC W/AUTO DIFF WBC: CPT

## 2023-12-04 PROCEDURE — 36415 COLL VENOUS BLD VENIPUNCTURE: CPT

## 2023-12-04 PROCEDURE — 80053 COMPREHEN METABOLIC PANEL: CPT

## 2023-12-04 PROCEDURE — 96402 CHEMO HORMON ANTINEOPL SQ/IM: CPT

## 2023-12-04 PROCEDURE — 6360000002 HC RX W HCPCS: Performed by: INTERNAL MEDICINE

## 2023-12-04 RX ORDER — SODIUM CHLORIDE 9 MG/ML
INJECTION, SOLUTION INTRAVENOUS CONTINUOUS
OUTPATIENT
Start: 2024-01-01

## 2023-12-04 RX ORDER — ACETAMINOPHEN 325 MG/1
650 TABLET ORAL
OUTPATIENT
Start: 2024-01-01

## 2023-12-04 RX ORDER — ONDANSETRON 2 MG/ML
8 INJECTION INTRAMUSCULAR; INTRAVENOUS
OUTPATIENT
Start: 2024-01-01

## 2023-12-04 RX ORDER — ALBUTEROL SULFATE 90 UG/1
4 AEROSOL, METERED RESPIRATORY (INHALATION) PRN
OUTPATIENT
Start: 2024-01-01

## 2023-12-04 RX ORDER — DIPHENHYDRAMINE HYDROCHLORIDE 50 MG/ML
50 INJECTION INTRAMUSCULAR; INTRAVENOUS
OUTPATIENT
Start: 2024-01-01

## 2023-12-04 RX ORDER — EPINEPHRINE 1 MG/ML
0.3 INJECTION, SOLUTION, CONCENTRATE INTRAVENOUS PRN
OUTPATIENT
Start: 2024-01-01

## 2023-12-04 RX ADMIN — LEUPROLIDE ACETATE 3.75 MG: KIT at 13:13

## 2023-12-04 ASSESSMENT — PAIN SCALES - GENERAL: PAINLEVEL_OUTOF10: 0

## 2023-12-04 NOTE — PROGRESS NOTES
Griffin Alvarado is a 48 y.o. female here today to follow up for breast cancer    1. Have you been to the ER, urgent care clinic since your last visit? Hospitalized since your last visit? no    2. Have you seen or consulted any other health care providers outside of the [address] [place] [address] Avenue since your last visit? Include any pap smears or colon screening.  no
Additionally, I discussed the possibility of  performing automated breast ultrasound to supplement MRI screening, especially  if screening is only performed every other year with MRI. 11/30/23 MRI breast  FINDINGS:  There is minimal background parenchymal enhancement and extreme fibroglandular  tissue in the right breast.     Left breast:  Post nipple sparing mastectomy and prepectoral implant reconstruction. A 7 mm,  round, irregular mass at 11:00 in the left breast adjacent to the pectoralis  muscle and implant is new from 2016 and shows mixed washout and plateau  kinetics. No axillary or internal mammary chain lymphadenopathy. Right breast:  Post prepectoral saline implant. At 6:00 in the middle to posterior third, and  area of non-masslike, irregular enhancement is new from 2016 and measures 4 x 8  mm. It shows mixed plateau and persistent headaches. No axillary or internal  mammary chain lymphadenopathy. A summary portfolio with key images has been sent from kinetic analysis software  to PACS. IMPRESSION:  IMPRESSION:   1. Left breast mass with washout kinetics (7 mm). BI-RADS 4.  2. Right breast non-masslike enhancement (4 x 8 mm). BI-RADS 4.  3. Overall assessment: BI-RADS Assessment Category 4: Suspicious abnormality. 4. Recommendation: Bilateral second look ultrasound and ultrasound-guided  biopsy. MRI guided biopsy of these masses may not be possible with implants in  place. 11/2/23 bone scan  FINDINGS:   Right clavicular head biopsied metastasis remains intensely active. Cervical focus of activity is increased, likely metastatic rather than  degenerative. Increased activity in the right fourth rib posteriorly is compatible with  metastasis. New foci of activity projecting in the posterior upper chest bilaterally may be  scapular versus ribs, and are compatible with metastases. IMPRESSION:  Increased bone metastases. 11/2/23 CT c/a/p  Chest:     Breasts/chest wall:  There is

## 2023-12-05 ENCOUNTER — CLINICAL DOCUMENTATION (OUTPATIENT)
Age: 53
End: 2023-12-05

## 2023-12-14 ENCOUNTER — TELEPHONE (OUTPATIENT)
Age: 53
End: 2023-12-14

## 2023-12-14 NOTE — TELEPHONE ENCOUNTER
Mychal from [facility] special[facility] pharmacy called and stated that a new prior auth needs to be put in for the patients kisquali 600mg. She stated that the patients insurance informed them that they only approved the previous auth for 1 month die to more scan needing to be done. She also stated that the medications scheduled delivery date is Tuesday.     Insurance # [phone]  # [phone]

## 2024-01-02 ENCOUNTER — HOSPITAL ENCOUNTER (OUTPATIENT)
Facility: HOSPITAL | Age: 54
Setting detail: INFUSION SERIES
Discharge: HOME | End: 2024-01-02
Payer: COMMERCIAL

## 2024-01-02 ENCOUNTER — OFFICE VISIT (OUTPATIENT)
Age: 54
End: 2024-01-02
Payer: COMMERCIAL

## 2024-01-02 VITALS
RESPIRATION RATE: 17 BRPM | DIASTOLIC BLOOD PRESSURE: 85 MMHG | TEMPERATURE: 98 F | SYSTOLIC BLOOD PRESSURE: 132 MMHG | HEART RATE: 60 BPM | OXYGEN SATURATION: 98 %

## 2024-01-02 VITALS
BODY MASS INDEX: 20.9 KG/M2 | SYSTOLIC BLOOD PRESSURE: 132 MMHG | TEMPERATURE: 98 F | OXYGEN SATURATION: 98 % | RESPIRATION RATE: 16 BRPM | HEART RATE: 60 BPM | DIASTOLIC BLOOD PRESSURE: 85 MMHG | WEIGHT: 100 LBS

## 2024-01-02 DIAGNOSIS — Z17.0 MALIGNANT NEOPLASM OF CENTRAL PORTION OF LEFT BREAST IN FEMALE, ESTROGEN RECEPTOR POSITIVE (HCC): Primary | ICD-10-CM

## 2024-01-02 DIAGNOSIS — C50.112 MALIGNANT NEOPLASM OF CENTRAL PORTION OF LEFT BREAST IN FEMALE, ESTROGEN RECEPTOR POSITIVE (HCC): Primary | ICD-10-CM

## 2024-01-02 DIAGNOSIS — C50.919 MALIGNANT NEOPLASM OF BREAST, STAGE 1, UNSPECIFIED ESTROGEN RECEPTOR STATUS, UNSPECIFIED LATERALITY (HCC): Primary | ICD-10-CM

## 2024-01-02 LAB
ALBUMIN SERPL-MCNC: 3.5 G/DL (ref 3.5–5)
ALBUMIN/GLOB SERPL: 0.9 (ref 1.1–2.2)
ALP SERPL-CCNC: 54 U/L (ref 45–117)
ALT SERPL-CCNC: 22 U/L (ref 12–78)
ANION GAP SERPL CALC-SCNC: 3 MMOL/L (ref 5–15)
AST SERPL-CCNC: 17 U/L (ref 15–37)
BASOPHILS # BLD: 0.1 K/UL (ref 0–0.1)
BASOPHILS NFR BLD: 2 % (ref 0–1)
BILIRUB SERPL-MCNC: 0.2 MG/DL (ref 0.2–1)
BUN SERPL-MCNC: 22 MG/DL (ref 6–20)
BUN/CREAT SERPL: 24 (ref 12–20)
CALCIUM SERPL-MCNC: 9.1 MG/DL (ref 8.5–10.1)
CHLORIDE SERPL-SCNC: 109 MMOL/L (ref 97–108)
CO2 SERPL-SCNC: 28 MMOL/L (ref 21–32)
CREAT SERPL-MCNC: 0.9 MG/DL (ref 0.55–1.02)
DIFFERENTIAL METHOD BLD: ABNORMAL
EOSINOPHIL # BLD: 0 K/UL (ref 0–0.4)
EOSINOPHIL NFR BLD: 1 % (ref 0–7)
ERYTHROCYTE [DISTWIDTH] IN BLOOD BY AUTOMATED COUNT: 12.1 % (ref 11.5–14.5)
GLOBULIN SER CALC-MCNC: 3.7 G/DL (ref 2–4)
GLUCOSE SERPL-MCNC: 126 MG/DL (ref 65–100)
HCT VFR BLD AUTO: 38.5 % (ref 35–47)
HGB BLD-MCNC: 13.2 G/DL (ref 11.5–16)
IMM GRANULOCYTES # BLD AUTO: 0 K/UL (ref 0–0.04)
IMM GRANULOCYTES NFR BLD AUTO: 0 % (ref 0–0.5)
LYMPHOCYTES # BLD: 1.5 K/UL (ref 0.8–3.5)
LYMPHOCYTES NFR BLD: 41 % (ref 12–49)
MCH RBC QN AUTO: 33.2 PG (ref 26–34)
MCHC RBC AUTO-ENTMCNC: 34.3 G/DL (ref 30–36.5)
MCV RBC AUTO: 97 FL (ref 80–99)
MONOCYTES # BLD: 0.3 K/UL (ref 0–1)
MONOCYTES NFR BLD: 9 % (ref 5–13)
NEUTS SEG # BLD: 1.7 K/UL (ref 1.8–8)
NEUTS SEG NFR BLD: 47 % (ref 32–75)
NRBC # BLD: 0 K/UL (ref 0–0.01)
NRBC BLD-RTO: 0 PER 100 WBC
PLATELET # BLD AUTO: 287 K/UL (ref 150–400)
PMV BLD AUTO: 9.2 FL (ref 8.9–12.9)
POTASSIUM SERPL-SCNC: 3.7 MMOL/L (ref 3.5–5.1)
PROT SERPL-MCNC: 7.2 G/DL (ref 6.4–8.2)
RBC # BLD AUTO: 3.97 M/UL (ref 3.8–5.2)
SODIUM SERPL-SCNC: 140 MMOL/L (ref 136–145)
WBC # BLD AUTO: 3.6 K/UL (ref 3.6–11)

## 2024-01-02 PROCEDURE — 80053 COMPREHEN METABOLIC PANEL: CPT

## 2024-01-02 PROCEDURE — 6360000002 HC RX W HCPCS: Performed by: INTERNAL MEDICINE

## 2024-01-02 PROCEDURE — 36415 COLL VENOUS BLD VENIPUNCTURE: CPT

## 2024-01-02 PROCEDURE — 85025 COMPLETE CBC W/AUTO DIFF WBC: CPT

## 2024-01-02 PROCEDURE — 96402 CHEMO HORMON ANTINEOPL SQ/IM: CPT

## 2024-01-02 PROCEDURE — 99215 OFFICE O/P EST HI 40 MIN: CPT | Performed by: INTERNAL MEDICINE

## 2024-01-02 RX ORDER — DIPHENHYDRAMINE HYDROCHLORIDE 50 MG/ML
50 INJECTION INTRAMUSCULAR; INTRAVENOUS
OUTPATIENT
Start: 2024-01-28

## 2024-01-02 RX ORDER — SODIUM CHLORIDE 9 MG/ML
INJECTION, SOLUTION INTRAVENOUS CONTINUOUS
OUTPATIENT
Start: 2024-01-28

## 2024-01-02 RX ORDER — ACETAMINOPHEN 325 MG/1
650 TABLET ORAL
OUTPATIENT
Start: 2024-01-28

## 2024-01-02 RX ORDER — EPINEPHRINE 1 MG/ML
0.3 INJECTION, SOLUTION INTRAMUSCULAR; SUBCUTANEOUS PRN
OUTPATIENT
Start: 2024-01-28

## 2024-01-02 RX ORDER — FAMOTIDINE 10 MG/ML
20 INJECTION, SOLUTION INTRAVENOUS
OUTPATIENT
Start: 2024-01-28

## 2024-01-02 RX ORDER — ONDANSETRON 2 MG/ML
8 INJECTION INTRAMUSCULAR; INTRAVENOUS
OUTPATIENT
Start: 2024-01-28

## 2024-01-02 RX ORDER — ALBUTEROL SULFATE 90 UG/1
4 AEROSOL, METERED RESPIRATORY (INHALATION) PRN
OUTPATIENT
Start: 2024-01-28

## 2024-01-02 RX ORDER — RIBOCICLIB 200 MG/1
TABLET, FILM COATED ORAL
Qty: 63 EACH | Refills: 5 | Status: ACTIVE | OUTPATIENT
Start: 2024-01-02

## 2024-01-02 RX ADMIN — LEUPROLIDE ACETATE 3.75 MG: KIT at 13:13

## 2024-01-02 ASSESSMENT — PAIN SCALES - GENERAL: PAINLEVEL_OUTOF10: 0

## 2024-01-02 NOTE — PROGRESS NOTES
[facility] Chemo Progress Note    Date: January 2, 2024        1300: Ms. Schuler Arrived to [facility] for  lupron injection ambulatory in stable condition.  Assessment was completed labs drawn peripherally in Right AC and sent for processing. Criteria for treatment was met. Patient stated there was no chance she could be pregnant today. Went to provider appointment with Medical Oncology.      Ms. Schuler's vitals were reviewed.  Patient Vitals for the past 12 hrs:   Temp Pulse Resp BP SpO2   01/02/24 1302 98 °F (36.7 °C) 60 17 132/85 98 %       Pre-medications  were administered as ordered and chemotherapy was initiated.  Medications Administered         leuprolide (LUPRON) injection 3.75 mg Admin Date  01/02/2024 Action  Given Dose  3.75 mg Route  IntraMUSCular Administered By  Nena Peraza, RN              Two nurses verified prior to administering: Drug name, Drug dose, Infusion volume or drug volume when prepared in a syringe, Rate of administration, Route of administration, Expiration dates and/or times, Appearance and physical integrity of the drugs, Rate set on infusion pump, when used, and Sequencing of drug administration.    1318 Patient tolerated treatment well. Patient was discharged in stable condition. Patient is aware of next scheduled [facility] appointment on 1/29/24 at 1:00 PM.    Future Appointments   Date Time Provider Department Center   1/2/2024  1:30 PM Brandon Saldana MD [facility] BS AMB   1/29/2024  1:00 PM SS INF5 CH4 <1H [facility]   2/26/2024  1:00 PM SS INF4 CH4 <1H [facility]         Nena Peraza RN, RN  January 2, 2024

## 2024-01-02 NOTE — PROGRESS NOTES
Lindsay Schuler is a 53 y.o. female here today to follow up for breast cancer    1. Have you been to the ER, urgent care clinic since your last visit?  Hospitalized since your last visit? no    2. Have you seen or consulted any other health care providers outside of the [facility] since your last visit?  Include any pap smears or colon screening. no

## 2024-01-02 NOTE — PROGRESS NOTES
[facility] [place] at   [address] [facility] [address], [address] [facility] [address]  W: [phone]  F: [phone]      Reason for Visit:   Lindsay Schuler is a 53 y.o. female who is seen in follow up for breast cancer.    Breast Surgeon: Dr. Veras    Treatment History:   9/3/15 left breast lumpectomy:  IDC 0.3 cm, gr 1-2, ER + at 95%, NC + at 30%, HER 2 negative at IHC 1+; DCIS present, solid, papillary, gr 2; pT1a pNx cM0  10/7/15  left breast core bx:  IDC, gr 2, ER + at 100%, NC + at 50%, HER 2 negative at IHC 0; DCIS gr 2-3, ER + at 100%, NC + at 20%  Mammaprint shows high risk luminal B 2015  12/1/15:  SLN bx:  0/6 LN  [facility]:  NBN pathologic mutation; BRCA2 VUS  12/15/15:  left breast mastectomy:  IDC, 0.4 cm, gr 2, HER 2 negative at IHC 0; extensive DCIS 1.4 cm, solid, gr 3, no LVI, 0/6 LN, pT1a pN0 cM0  Declined chemotherapy and endocrine therapy  5/17/23 left breast mass core bx:  IDC, gr 1-2, 1.3 cm, ER + at 95%, NC < 1%, HER 2 negative at IHC 0, ki67 5%; DCIS ER + at 95%, NC negative; + skeletal muscle involvement  Mammaprint shows high risk luminal B (-0.273) (2023)  6/30/23 right clavicle bone lesion:  metastatic breast cancer, ER +, weakly 1-10%, NC negative, HER 2 negative at IHC 0  7/17/24- lupron  Zometa 8/14/23- current  Letrozole 8/15/23- current  Ribociclib 400 mg (pt preference) 8/15/23- current    History of Present Illness:   In 2015, abnormal discharge led to the original pathology.  Mamogram 12/2014 was negative.  Surgical bx originally.  She states she noticed an abnormality in her L breast in 2016, MRI negative that year, MRI shows abnormality in 2018, US called it fat necrosis.  Mass grew this year, biopsy above.    Interval history: Patient presents today for follow up on letrozole and ribociclib. Reports gr 1 fatigue, gr 2 hot flashes, gr 1 insomnia, gr 1 loss of concentration, gr 1 anxiety, gr 1 pain,       LMP 7/4/23    Past Medical History:

## 2024-01-22 RX ORDER — SODIUM CHLORIDE 9 MG/ML
5-250 INJECTION, SOLUTION INTRAVENOUS PRN
OUTPATIENT
Start: 2024-01-29

## 2024-01-22 RX ORDER — ZOLEDRONIC ACID 0.04 MG/ML
4 INJECTION, SOLUTION INTRAVENOUS ONCE
OUTPATIENT
Start: 2024-01-29 | End: 2024-01-29

## 2024-01-29 ENCOUNTER — HOSPITAL ENCOUNTER (OUTPATIENT)
Facility: HOSPITAL | Age: 54
Setting detail: INFUSION SERIES
Discharge: HOME | End: 2024-01-29
Payer: COMMERCIAL

## 2024-01-29 ENCOUNTER — OFFICE VISIT (OUTPATIENT)
Age: 54
End: 2024-01-29
Payer: COMMERCIAL

## 2024-01-29 VITALS
SYSTOLIC BLOOD PRESSURE: 147 MMHG | WEIGHT: 98 LBS | HEART RATE: 61 BPM | DIASTOLIC BLOOD PRESSURE: 81 MMHG | RESPIRATION RATE: 16 BRPM | OXYGEN SATURATION: 99 % | TEMPERATURE: 97.9 F | BODY MASS INDEX: 20.49 KG/M2

## 2024-01-29 VITALS
RESPIRATION RATE: 18 BRPM | BODY MASS INDEX: 20.68 KG/M2 | OXYGEN SATURATION: 96 % | WEIGHT: 98.5 LBS | DIASTOLIC BLOOD PRESSURE: 87 MMHG | HEIGHT: 58 IN | TEMPERATURE: 97.7 F | HEART RATE: 66 BPM | SYSTOLIC BLOOD PRESSURE: 164 MMHG

## 2024-01-29 DIAGNOSIS — Z17.0 MALIGNANT NEOPLASM OF CENTRAL PORTION OF LEFT BREAST IN FEMALE, ESTROGEN RECEPTOR POSITIVE (HCC): Primary | ICD-10-CM

## 2024-01-29 DIAGNOSIS — C50.919 MALIGNANT NEOPLASM OF BREAST, STAGE 1, UNSPECIFIED ESTROGEN RECEPTOR STATUS, UNSPECIFIED LATERALITY (HCC): Primary | ICD-10-CM

## 2024-01-29 DIAGNOSIS — C79.51 CARCINOMA OF BREAST METASTATIC TO BONE, UNSPECIFIED LATERALITY (HCC): ICD-10-CM

## 2024-01-29 DIAGNOSIS — C50.112 MALIGNANT NEOPLASM OF CENTRAL PORTION OF LEFT BREAST IN FEMALE, ESTROGEN RECEPTOR POSITIVE (HCC): Primary | ICD-10-CM

## 2024-01-29 DIAGNOSIS — C50.919 CARCINOMA OF BREAST METASTATIC TO BONE, UNSPECIFIED LATERALITY (HCC): ICD-10-CM

## 2024-01-29 LAB
ALBUMIN SERPL-MCNC: 3.6 G/DL (ref 3.5–5)
ALBUMIN/GLOB SERPL: 1 (ref 1.1–2.2)
ALP SERPL-CCNC: 45 U/L (ref 45–117)
ALT SERPL-CCNC: 20 U/L (ref 12–78)
ANION GAP SERPL CALC-SCNC: 4 MMOL/L (ref 5–15)
AST SERPL-CCNC: 18 U/L (ref 15–37)
BASOPHILS # BLD: 0 K/UL (ref 0–0.1)
BASOPHILS NFR BLD: 0 % (ref 0–1)
BILIRUB SERPL-MCNC: 0.3 MG/DL (ref 0.2–1)
BUN SERPL-MCNC: 13 MG/DL (ref 6–20)
BUN/CREAT SERPL: 18 (ref 12–20)
CALCIUM SERPL-MCNC: 9.1 MG/DL (ref 8.5–10.1)
CHLORIDE SERPL-SCNC: 110 MMOL/L (ref 97–108)
CO2 SERPL-SCNC: 26 MMOL/L (ref 21–32)
CREAT SERPL-MCNC: 0.74 MG/DL (ref 0.55–1.02)
DIFFERENTIAL METHOD BLD: ABNORMAL
EOSINOPHIL # BLD: 0 K/UL (ref 0–0.4)
EOSINOPHIL NFR BLD: 0 % (ref 0–7)
ERYTHROCYTE [DISTWIDTH] IN BLOOD BY AUTOMATED COUNT: 11.6 % (ref 11.5–14.5)
GLOBULIN SER CALC-MCNC: 3.7 G/DL (ref 2–4)
GLUCOSE SERPL-MCNC: 117 MG/DL (ref 65–100)
HCT VFR BLD AUTO: 36 % (ref 35–47)
HGB BLD-MCNC: 12.8 G/DL (ref 11.5–16)
IMM GRANULOCYTES # BLD AUTO: 0 K/UL (ref 0–0.04)
IMM GRANULOCYTES NFR BLD AUTO: 0 % (ref 0–0.5)
LYMPHOCYTES # BLD: 1.3 K/UL (ref 0.8–3.5)
LYMPHOCYTES NFR BLD: 47 % (ref 12–49)
MCH RBC QN AUTO: 33.2 PG (ref 26–34)
MCHC RBC AUTO-ENTMCNC: 35.6 G/DL (ref 30–36.5)
MCV RBC AUTO: 93.3 FL (ref 80–99)
METAMYELOCYTES NFR BLD MANUAL: 1 %
MONOCYTES # BLD: 0.1 K/UL (ref 0–1)
MONOCYTES NFR BLD: 4 % (ref 5–13)
NEUTS SEG # BLD: 1.3 K/UL (ref 1.8–8)
NEUTS SEG NFR BLD: 48 % (ref 32–75)
NRBC # BLD: 0 K/UL (ref 0–0.01)
NRBC BLD-RTO: 0 PER 100 WBC
PLATELET # BLD AUTO: 220 K/UL (ref 150–400)
PMV BLD AUTO: 9.5 FL (ref 8.9–12.9)
POTASSIUM SERPL-SCNC: 3.8 MMOL/L (ref 3.5–5.1)
PROT SERPL-MCNC: 7.3 G/DL (ref 6.4–8.2)
RBC # BLD AUTO: 3.86 M/UL (ref 3.8–5.2)
RBC MORPH BLD: ABNORMAL
SODIUM SERPL-SCNC: 140 MMOL/L (ref 136–145)
WBC # BLD AUTO: 2.7 K/UL (ref 3.6–11)

## 2024-01-29 PROCEDURE — 96402 CHEMO HORMON ANTINEOPL SQ/IM: CPT

## 2024-01-29 PROCEDURE — 99215 OFFICE O/P EST HI 40 MIN: CPT | Performed by: INTERNAL MEDICINE

## 2024-01-29 PROCEDURE — 6360000002 HC RX W HCPCS: Performed by: INTERNAL MEDICINE

## 2024-01-29 PROCEDURE — 80053 COMPREHEN METABOLIC PANEL: CPT

## 2024-01-29 PROCEDURE — 36415 COLL VENOUS BLD VENIPUNCTURE: CPT

## 2024-01-29 PROCEDURE — 85025 COMPLETE CBC W/AUTO DIFF WBC: CPT

## 2024-01-29 RX ORDER — DIPHENHYDRAMINE HYDROCHLORIDE 50 MG/ML
50 INJECTION INTRAMUSCULAR; INTRAVENOUS
OUTPATIENT
Start: 2024-02-27

## 2024-01-29 RX ORDER — EPINEPHRINE 1 MG/ML
0.3 INJECTION, SOLUTION INTRAMUSCULAR; SUBCUTANEOUS PRN
OUTPATIENT
Start: 2024-02-27

## 2024-01-29 RX ORDER — SODIUM CHLORIDE 9 MG/ML
INJECTION, SOLUTION INTRAVENOUS CONTINUOUS
OUTPATIENT
Start: 2024-02-27

## 2024-01-29 RX ORDER — FAMOTIDINE 10 MG/ML
20 INJECTION, SOLUTION INTRAVENOUS
OUTPATIENT
Start: 2024-02-27

## 2024-01-29 RX ORDER — ALBUTEROL SULFATE 90 UG/1
4 AEROSOL, METERED RESPIRATORY (INHALATION) PRN
OUTPATIENT
Start: 2024-02-27

## 2024-01-29 RX ORDER — ACETAMINOPHEN 325 MG/1
650 TABLET ORAL
OUTPATIENT
Start: 2024-02-27

## 2024-01-29 RX ORDER — ONDANSETRON 2 MG/ML
8 INJECTION INTRAMUSCULAR; INTRAVENOUS
OUTPATIENT
Start: 2024-02-27

## 2024-01-29 RX ADMIN — LEUPROLIDE ACETATE 3.75 MG: KIT at 13:04

## 2024-01-29 ASSESSMENT — PAIN SCALES - GENERAL: PAINLEVEL_OUTOF10: 0

## 2024-01-29 NOTE — PROGRESS NOTES
[facility] Progress Note    Date: 2024    Name: Lindsay Schuler    MRN: [id number]         : 1970    Ms. Schuler Arrived ambulatory and in no distress for Lupron(LGM) Injection. And Zometa (Zometa held). Assessment was completed, no acute issues at this time, no new complaints voiced. PT states she was sick last week with high fevers, congestion, and sore throat. Pt states she tested negative for covid and flu. Per pt request, she would like to delay zometa until next visit as after her first tx she had body aches/flu like symptoms and is still recovering from her illness last week. NP states this is ok. Labs drawn per orders from pts right arm without difficulty and sent for processing.       Ms. Schuler's vitals were reviewed.  Vitals:    24 1247   BP: (!) 164/87   Pulse: 66   Resp: 18   Temp: 97.7 °F (36.5 °C)   SpO2: 96%         Medications:  Medications Administered         leuprolide (LUPRON) injection 3.75 mg Admin Date  2024 Action  Given Dose  3.75 mg Route  IntraMUSCular Administered By  Ivonne Tejeda, ABAD             Ms. Schuler tolerated treatment well and [facility].   Patient is aware of future appointments.    Future Appointments   Date Time Provider Department Center   2024  1:30 PM Brandon Saldana MD [facility]   2024  1:00 PM SS INF4 CH4 <1H [facility]       Ivonne Tejeda RN  2024

## 2024-01-29 NOTE — PROGRESS NOTES
Lindsay Schuler is a 53 y.o. female here today to follow up for breast cancer    1. Have you been to the ER, urgent care clinic since your last visit?  Hospitalized since your last visit?no    2. Have you seen or consulted any other health care providers outside of the [facility] since your last visit?  Include any pap smears or colon screening. no    
excellent images.  BI-RADS Category 2 - Benign findings.   .  RECOMMENDATION:  Given the patient's adamant refusal for mammography, recommend yearly screening  MRI performed given her high risk. Additionally, I discussed the possibility of  performing automated breast ultrasound to supplement MRI screening, especially  if screening is only performed every other year with MRI.     11/30/23 MRI breast  FINDINGS:  There is minimal background parenchymal enhancement and extreme fibroglandular  tissue in the right breast.     Left breast:  Post nipple sparing mastectomy and prepectoral implant reconstruction. A 7 mm,  round, irregular mass at 11:00 in the left breast adjacent to the pectoralis  muscle and implant is new from 2016 and shows mixed washout and plateau  kinetics. No axillary or internal mammary chain lymphadenopathy.     Right breast:  Post prepectoral saline implant. At 6:00 in the middle to posterior third, and  area of non-masslike, irregular enhancement is new from 2016 and measures 4 x 8  mm. It shows mixed plateau and persistent headaches. No axillary or internal  mammary chain lymphadenopathy.     A summary portfolio with key images has been sent from kinetic analysis software  to PACS.     IMPRESSION:  IMPRESSION:   1. Left breast mass with washout kinetics (7 mm). BI-RADS 4.  2. Right breast non-masslike enhancement (4 x 8 mm). BI-RADS 4.  3. Overall assessment: BI-RADS Assessment Category 4: Suspicious abnormality.   4. Recommendation: Bilateral second look ultrasound and ultrasound-guided  biopsy. MRI guided biopsy of these masses may not be possible with implants in  place.    11/2/23 bone scan  FINDINGS:   Right clavicular head biopsied metastasis remains intensely active.  Cervical focus of activity is increased, likely metastatic rather than  degenerative.  Increased activity in the right fourth rib posteriorly is compatible with  metastasis.  New foci of activity projecting in the posterior

## 2024-02-19 ENCOUNTER — HOSPITAL ENCOUNTER (OUTPATIENT)
Facility: HOSPITAL | Age: 54
Discharge: HOME | End: 2024-02-22
Payer: COMMERCIAL

## 2024-02-19 DIAGNOSIS — C79.51 CARCINOMA OF BREAST METASTATIC TO BONE, UNSPECIFIED LATERALITY (HCC): ICD-10-CM

## 2024-02-19 DIAGNOSIS — C50.112 MALIGNANT NEOPLASM OF CENTRAL PORTION OF LEFT BREAST IN FEMALE, ESTROGEN RECEPTOR POSITIVE (HCC): ICD-10-CM

## 2024-02-19 DIAGNOSIS — C50.919 CARCINOMA OF BREAST METASTATIC TO BONE, UNSPECIFIED LATERALITY (HCC): ICD-10-CM

## 2024-02-19 DIAGNOSIS — Z17.0 MALIGNANT NEOPLASM OF CENTRAL PORTION OF LEFT BREAST IN FEMALE, ESTROGEN RECEPTOR POSITIVE (HCC): ICD-10-CM

## 2024-02-19 PROCEDURE — 78306 BONE IMAGING WHOLE BODY: CPT | Performed by: NURSE PRACTITIONER

## 2024-02-19 PROCEDURE — 71260 CT THORAX DX C+: CPT

## 2024-02-19 PROCEDURE — A9503 TC99M MEDRONATE: HCPCS | Performed by: NURSE PRACTITIONER

## 2024-02-19 PROCEDURE — 3430000000 HC RX DIAGNOSTIC RADIOPHARMACEUTICAL: Performed by: NURSE PRACTITIONER

## 2024-02-19 PROCEDURE — 6360000004 HC RX CONTRAST MEDICATION: Performed by: NURSE PRACTITIONER

## 2024-02-19 RX ORDER — TC 99M MEDRONATE 20 MG/10ML
24 INJECTION, POWDER, LYOPHILIZED, FOR SOLUTION INTRAVENOUS
Status: COMPLETED | OUTPATIENT
Start: 2024-02-19 | End: 2024-02-19

## 2024-02-19 RX ADMIN — TC 99M MEDRONATE 24 MILLICURIE: 20 INJECTION, POWDER, LYOPHILIZED, FOR SOLUTION INTRAVENOUS at 07:45

## 2024-02-19 RX ADMIN — IOPAMIDOL 100 ML: 755 INJECTION, SOLUTION INTRAVENOUS at 08:13

## 2024-02-26 ENCOUNTER — TELEPHONE (OUTPATIENT)
Age: 54
End: 2024-02-26

## 2024-02-26 ENCOUNTER — OFFICE VISIT (OUTPATIENT)
Age: 54
End: 2024-02-26
Payer: COMMERCIAL

## 2024-02-26 ENCOUNTER — HOSPITAL ENCOUNTER (OUTPATIENT)
Facility: HOSPITAL | Age: 54
Setting detail: INFUSION SERIES
Discharge: HOME | End: 2024-02-26
Payer: COMMERCIAL

## 2024-02-26 VITALS
RESPIRATION RATE: 16 BRPM | BODY MASS INDEX: 20.91 KG/M2 | DIASTOLIC BLOOD PRESSURE: 71 MMHG | HEART RATE: 66 BPM | TEMPERATURE: 98 F | OXYGEN SATURATION: 98 % | WEIGHT: 100 LBS | SYSTOLIC BLOOD PRESSURE: 137 MMHG

## 2024-02-26 VITALS
WEIGHT: 100.8 LBS | RESPIRATION RATE: 16 BRPM | HEART RATE: 62 BPM | BODY MASS INDEX: 21.07 KG/M2 | DIASTOLIC BLOOD PRESSURE: 76 MMHG | SYSTOLIC BLOOD PRESSURE: 131 MMHG | TEMPERATURE: 98 F | OXYGEN SATURATION: 99 %

## 2024-02-26 DIAGNOSIS — Z17.0 MALIGNANT NEOPLASM OF CENTRAL PORTION OF LEFT BREAST IN FEMALE, ESTROGEN RECEPTOR POSITIVE (HCC): Primary | ICD-10-CM

## 2024-02-26 DIAGNOSIS — C50.919 MALIGNANT NEOPLASM OF BREAST, STAGE 1, UNSPECIFIED ESTROGEN RECEPTOR STATUS, UNSPECIFIED LATERALITY (HCC): Primary | ICD-10-CM

## 2024-02-26 DIAGNOSIS — C50.112 MALIGNANT NEOPLASM OF CENTRAL PORTION OF LEFT BREAST IN FEMALE, ESTROGEN RECEPTOR POSITIVE (HCC): Primary | ICD-10-CM

## 2024-02-26 DIAGNOSIS — F33.9 RECURRENT DEPRESSION (HCC): ICD-10-CM

## 2024-02-26 LAB
ALBUMIN SERPL-MCNC: 4 G/DL (ref 3.5–5)
ALBUMIN/GLOB SERPL: 1.1 (ref 1.1–2.2)
ALP SERPL-CCNC: 68 U/L (ref 45–117)
ALT SERPL-CCNC: 23 U/L (ref 12–78)
ANION GAP SERPL CALC-SCNC: 0 MMOL/L (ref 5–15)
AST SERPL-CCNC: 28 U/L (ref 15–37)
BASOPHILS # BLD: 0.1 K/UL (ref 0–0.1)
BASOPHILS NFR BLD: 1 % (ref 0–1)
BILIRUB SERPL-MCNC: 0.4 MG/DL (ref 0.2–1)
BUN SERPL-MCNC: 18 MG/DL (ref 6–20)
BUN/CREAT SERPL: 21 (ref 12–20)
CALCIUM SERPL-MCNC: 9.5 MG/DL (ref 8.5–10.1)
CHLORIDE SERPL-SCNC: 109 MMOL/L (ref 97–108)
CO2 SERPL-SCNC: 29 MMOL/L (ref 21–32)
CREAT SERPL-MCNC: 0.86 MG/DL (ref 0.55–1.02)
DIFFERENTIAL METHOD BLD: NORMAL
EOSINOPHIL # BLD: 0.1 K/UL (ref 0–0.4)
EOSINOPHIL NFR BLD: 1 % (ref 0–7)
ERYTHROCYTE [DISTWIDTH] IN BLOOD BY AUTOMATED COUNT: 12.7 % (ref 11.5–14.5)
GLOBULIN SER CALC-MCNC: 3.8 G/DL (ref 2–4)
GLUCOSE SERPL-MCNC: 122 MG/DL (ref 65–100)
HCT VFR BLD AUTO: 39.3 % (ref 35–47)
HGB BLD-MCNC: 13.5 G/DL (ref 11.5–16)
IMM GRANULOCYTES # BLD AUTO: 0 K/UL (ref 0–0.04)
IMM GRANULOCYTES NFR BLD AUTO: 0 % (ref 0–0.5)
LYMPHOCYTES # BLD: 1 K/UL (ref 0.8–3.5)
LYMPHOCYTES NFR BLD: 19 % (ref 12–49)
MCH RBC QN AUTO: 33.4 PG (ref 26–34)
MCHC RBC AUTO-ENTMCNC: 34.4 G/DL (ref 30–36.5)
MCV RBC AUTO: 97.3 FL (ref 80–99)
MONOCYTES # BLD: 0.3 K/UL (ref 0–1)
MONOCYTES NFR BLD: 6 % (ref 5–13)
NEUTS SEG # BLD: 3.9 K/UL (ref 1.8–8)
NEUTS SEG NFR BLD: 73 % (ref 32–75)
NRBC # BLD: 0 K/UL (ref 0–0.01)
NRBC BLD-RTO: 0 PER 100 WBC
PLATELET # BLD AUTO: 255 K/UL (ref 150–400)
PMV BLD AUTO: 9.4 FL (ref 8.9–12.9)
POTASSIUM SERPL-SCNC: 4.7 MMOL/L (ref 3.5–5.1)
PROT SERPL-MCNC: 7.8 G/DL (ref 6.4–8.2)
RBC # BLD AUTO: 4.04 M/UL (ref 3.8–5.2)
SODIUM SERPL-SCNC: 138 MMOL/L (ref 136–145)
WBC # BLD AUTO: 5.3 K/UL (ref 3.6–11)

## 2024-02-26 PROCEDURE — 2580000003 HC RX 258: Performed by: INTERNAL MEDICINE

## 2024-02-26 PROCEDURE — 96367 TX/PROPH/DG ADDL SEQ IV INF: CPT

## 2024-02-26 PROCEDURE — 85025 COMPLETE CBC W/AUTO DIFF WBC: CPT

## 2024-02-26 PROCEDURE — 99215 OFFICE O/P EST HI 40 MIN: CPT | Performed by: INTERNAL MEDICINE

## 2024-02-26 PROCEDURE — 36415 COLL VENOUS BLD VENIPUNCTURE: CPT

## 2024-02-26 PROCEDURE — 80053 COMPREHEN METABOLIC PANEL: CPT

## 2024-02-26 PROCEDURE — 96402 CHEMO HORMON ANTINEOPL SQ/IM: CPT

## 2024-02-26 PROCEDURE — 96365 THER/PROPH/DIAG IV INF INIT: CPT

## 2024-02-26 PROCEDURE — 6360000002 HC RX W HCPCS: Performed by: INTERNAL MEDICINE

## 2024-02-26 RX ORDER — SODIUM CHLORIDE 9 MG/ML
INJECTION, SOLUTION INTRAVENOUS CONTINUOUS
OUTPATIENT
Start: 2024-03-25

## 2024-02-26 RX ORDER — DIPHENHYDRAMINE HYDROCHLORIDE 50 MG/ML
50 INJECTION INTRAMUSCULAR; INTRAVENOUS
OUTPATIENT
Start: 2024-03-25

## 2024-02-26 RX ORDER — ONDANSETRON 2 MG/ML
8 INJECTION INTRAMUSCULAR; INTRAVENOUS
OUTPATIENT
Start: 2024-03-25

## 2024-02-26 RX ORDER — ALBUTEROL SULFATE 90 UG/1
4 AEROSOL, METERED RESPIRATORY (INHALATION) PRN
OUTPATIENT
Start: 2024-05-20

## 2024-02-26 RX ORDER — SODIUM CHLORIDE 0.9 % (FLUSH) 0.9 %
5-40 SYRINGE (ML) INJECTION PRN
OUTPATIENT
Start: 2024-05-20

## 2024-02-26 RX ORDER — FAMOTIDINE 10 MG/ML
20 INJECTION, SOLUTION INTRAVENOUS
OUTPATIENT
Start: 2024-05-20

## 2024-02-26 RX ORDER — DULOXETIN HYDROCHLORIDE 20 MG/1
20 CAPSULE, DELAYED RELEASE ORAL DAILY
Qty: 30 CAPSULE | Refills: 3 | Status: SHIPPED | OUTPATIENT
Start: 2024-02-26

## 2024-02-26 RX ORDER — DIPHENHYDRAMINE HYDROCHLORIDE 50 MG/ML
50 INJECTION INTRAMUSCULAR; INTRAVENOUS
OUTPATIENT
Start: 2024-05-20

## 2024-02-26 RX ORDER — SODIUM CHLORIDE 9 MG/ML
5-250 INJECTION, SOLUTION INTRAVENOUS PRN
OUTPATIENT
Start: 2024-05-20

## 2024-02-26 RX ORDER — ONDANSETRON 2 MG/ML
8 INJECTION INTRAMUSCULAR; INTRAVENOUS
OUTPATIENT
Start: 2024-05-20

## 2024-02-26 RX ORDER — FAMOTIDINE 10 MG/ML
20 INJECTION, SOLUTION INTRAVENOUS
OUTPATIENT
Start: 2024-03-25

## 2024-02-26 RX ORDER — ALBUTEROL SULFATE 90 UG/1
4 AEROSOL, METERED RESPIRATORY (INHALATION) PRN
OUTPATIENT
Start: 2024-03-25

## 2024-02-26 RX ORDER — SODIUM CHLORIDE 9 MG/ML
5-250 INJECTION, SOLUTION INTRAVENOUS PRN
Status: DISCONTINUED | OUTPATIENT
Start: 2024-02-26 | End: 2024-02-27 | Stop reason: HOSPADM

## 2024-02-26 RX ORDER — HEPARIN 100 UNIT/ML
500 SYRINGE INTRAVENOUS PRN
OUTPATIENT
Start: 2024-05-20

## 2024-02-26 RX ORDER — EPINEPHRINE 1 MG/ML
0.3 INJECTION, SOLUTION INTRAMUSCULAR; SUBCUTANEOUS PRN
OUTPATIENT
Start: 2024-03-25

## 2024-02-26 RX ORDER — ACETAMINOPHEN 325 MG/1
650 TABLET ORAL
OUTPATIENT
Start: 2024-05-20

## 2024-02-26 RX ORDER — EPINEPHRINE 1 MG/ML
0.3 INJECTION, SOLUTION INTRAMUSCULAR; SUBCUTANEOUS PRN
OUTPATIENT
Start: 2024-05-20

## 2024-02-26 RX ORDER — ZOLEDRONIC ACID 0.04 MG/ML
4 INJECTION, SOLUTION INTRAVENOUS ONCE
OUTPATIENT
Start: 2024-05-20 | End: 2024-05-20

## 2024-02-26 RX ORDER — ACETAMINOPHEN 325 MG/1
650 TABLET ORAL
OUTPATIENT
Start: 2024-03-25

## 2024-02-26 RX ORDER — SODIUM CHLORIDE 9 MG/ML
INJECTION, SOLUTION INTRAVENOUS CONTINUOUS
OUTPATIENT
Start: 2024-05-20

## 2024-02-26 RX ADMIN — LEUPROLIDE ACETATE 3.75 MG: KIT at 13:21

## 2024-02-26 RX ADMIN — ZOLEDRONIC ACID 3.5 MG: 4 INJECTION, SOLUTION, CONCENTRATE INTRAVENOUS at 14:56

## 2024-02-26 RX ADMIN — SODIUM CHLORIDE 25 ML/HR: 9 INJECTION, SOLUTION INTRAVENOUS at 14:55

## 2024-02-26 ASSESSMENT — PAIN SCALES - GENERAL: PAINLEVEL_OUTOF10: 0

## 2024-02-26 NOTE — PROGRESS NOTES
Cancer [place] at [facility]  [address], [address] [place] [address]  W: [phone]  F: [phone]      Reason for Visit:   Lindsay Schuler is a 53 y.o. female who is seen in follow up for breast cancer.    Breast Surgeon: Dr. Veras    Treatment History:   9/3/15 left breast lumpectomy:  IDC 0.3 cm, gr 1-2, ER + at 95%, IN + at 30%, HER 2 negative at IHC 1+; DCIS present, solid, papillary, gr 2; pT1a pNx cM0  10/7/15  left breast core bx:  IDC, gr 2, ER + at 100%, IN + at 50%, HER 2 negative at IHC 0; DCIS gr 2-3, ER + at 100%, IN + at 20%  Mammaprint shows high risk luminal B 2015  12/1/15:  SLN bx:  0/6 LN  [facility]:  NBN pathologic mutation; BRCA2 VUS  12/15/15:  left breast mastectomy:  IDC, 0.4 cm, gr 2, HER 2 negative at IHC 0; extensive DCIS 1.4 cm, solid, gr 3, no LVI, 0/6 LN, pT1a pN0 cM0  Declined chemotherapy and endocrine therapy  5/17/23 left breast mass core bx:  IDC, gr 1-2, 1.3 cm, ER + at 95%, IN < 1%, HER 2 negative at IHC 0, ki67 5%; DCIS ER + at 95%, IN negative; + skeletal muscle involvement  Mammaprint shows high risk luminal B (-0.273) (2023)  6/30/23 right clavicle bone lesion:  metastatic breast cancer, ER +, weakly 1-10%, IN negative, HER 2 negative at IHC 0  7/17/24- lupron  Zometa 8/14/23- current  Letrozole 8/15/23- current  Ribociclib 400 mg (pt preference) 8/15/23- current    History of Present Illness:   In 2015, abnormal discharge led to the original pathology.  Mamogram 12/2014 was negative.  Surgical bx originally.  She states she noticed an abnormality in her L breast in 2016, MRI negative that year, MRI shows abnormality in 2018, US called it fat necrosis.  Mass grew this year, biopsy above.    Interval history: Patient presents today for follow up on letrozole and ribociclib. Reports gr 1 fatigue, gr 2 hot flashes, gr 1 insomnia, 5/10 finger pain, gr 1 LE edema    She had a fever she attributes to a respiratory virus last week.

## 2024-02-26 NOTE — TELEPHONE ENCOUNTER
[facility] [place] at [facility]  [phone]    02/26/24 9:09 AM EST - Called and left a message requesting a call back to see how the patient was doing and to see if she would be willing to still come in today if we held off the zometa.

## 2024-02-26 NOTE — PROGRESS NOTES
Lindsay Schuler is a 53 y.o. female here today to follow up for breast cancer    1. Have you been to the ER, urgent care clinic since your last visit?  Hospitalized since your last visit? no    2. Have you seen or consulted any other health care providers outside of the [facility] since your last visit?  Include any pap smears or colon screening. no

## 2024-02-26 NOTE — PROGRESS NOTES
[facility] Short Visit Progress Note    Ms. Schuler admitted to \[facility]\"" for Lupron and Zometa ambulatory in stable condition. Assessment completed. No new concerns voiced, some swelling in hands noted.     Vital Signs:  /71   Pulse 66   Temp 98 °F (36.7 °C) (Temporal)   Resp 16   Wt 45.7 kg (100 lb 12.8 oz)   SpO2 99%   BMI 21.07 kg/m²       R arm PIV with positive blood return.   Lab Results:  Recent Results (from the past 12 hour(s))   CBC with Auto Differential    Collection Time: 02/26/24  1:10 PM   Result Value Ref Range    WBC 5.3 3.6 - 11.0 K/uL    RBC 4.04 3.80 - 5.20 M/uL    Hemoglobin 13.5 11.5 - 16.0 g/dL    Hematocrit 39.3 35.0 - 47.0 %    MCV 97.3 80.0 - 99.0 FL    MCH 33.4 26.0 - 34.0 PG    MCHC 34.4 30.0 - 36.5 g/dL    RDW 12.7 11.5 - 14.5 %    Platelets 255 150 - 400 K/uL    MPV 9.4 8.9 - 12.9 FL    Nucleated RBCs 0.0 0  WBC    nRBC 0.00 0.00 - 0.01 K/uL    Neutrophils % 73 32 - 75 %    Lymphocytes % 19 12 - 49 %    Monocytes % 6 5 - 13 %    Eosinophils % 1 0 - 7 %    Basophils % 1 0 - 1 %    Immature Granulocytes 0 0.0 - 0.5 %    Neutrophils Absolute 3.9 1.8 - 8.0 K/UL    Lymphocytes Absolute 1.0 0.8 - 3.5 K/UL    Monocytes Absolute 0.3 0.0 - 1.0 K/UL    Eosinophils Absolute 0.1 0.0 - 0.4 K/UL    Basophils Absolute 0.1 0.0 - 0.1 K/UL    Absolute Immature Granulocyte 0.0 0.00 - 0.04 K/UL    Differential Type AUTOMATED     Comprehensive Metabolic Panel    Collection Time: 02/26/24  1:10 PM   Result Value Ref Range    Sodium 138 136 - 145 mmol/L    Potassium 4.7 3.5 - 5.1 mmol/L    Chloride 109 (H) 97 - 108 mmol/L    CO2 29 21 - 32 mmol/L    Anion Gap 0 (L) 5 - 15 mmol/L    Glucose 122 (H) 65 - 100 mg/dL    BUN 18 6 - 20 MG/DL    Creatinine 0.86 0.55 - 1.02 MG/DL    Bun/Cre Ratio 21 (H) 12 - 20      Est, Glom Filt Rate >60 >60 ml/min/1.73m2    Calcium 9.5 8.5 - 10.1 MG/DL    Total Bilirubin 0.4 0.2 - 1.0 MG/DL    ALT 23 12 - 78 U/L    AST 28 15 - 37 U/L    Alk Phosphatase  68 45 - 117 U/L    Total Protein 7.8 6.4 - 8.2 g/dL    Albumin 4.0 3.5 - 5.0 g/dL    Globulin 3.8 2.0 - 4.0 g/dL    Albumin/Globulin Ratio 1.1 1.1 - 2.2             Medications:      Medications Administered         0.9 % sodium chloride infusion Admin Date  02/26/2024 Action  New Bag Dose  25 mL/hr Rate  25 mL/hr Route  IntraVENous Administered By  Perri Mariee RN        leuprolide (LUPRON) injection 3.75 mg Admin Date  02/26/2024 Action  Given Dose  3.75 mg Rate   Route  IntraMUSCular Administered By  Perri Mariee RN        zoledronic acid (ZOMETA) 3.5 mg in sodium chloride 0.9 % 100 mL IVPB Admin Date  02/26/2024 Action  New Bag Dose  3.5 mg Rate  343.2 mL/hr Route  IntraVENous Administered By  Perri Mariee RN           Lupron given RGM        Patient tolerated treatment well. Patient discharged from [facility] ambulatory in no distress. Patient aware of next appointment.    Perri Mariee RN  February 26, 2024    Future Appointments   Date Time Provider Department Center   3/25/2024  1:00 PM SS INF7 CH3 <1H [facility]   3/25/2024  1:30 PM Brandon Saldana MD [facility] BS [facility]   4/22/2024  1:00 PM SS INF5 CH4 <1H [facility]   5/20/2024  1:00 PM [facility]

## 2024-02-26 NOTE — TELEPHONE ENCOUNTER
[facility] [place] at [facility]  [phone]    02/26/24 9:38 AM EST - Patient called back. She stated that she is congested and has a slight sore throat. No fevers and no other symptoms. Both her and her  tested for COVID and it was negative. Patient stated that if we were ok with her coming in, she would come and get treatment. Ok to come in per Trupti Krishnan NP.

## 2024-03-25 ENCOUNTER — OFFICE VISIT (OUTPATIENT)
Age: 54
End: 2024-03-25
Payer: COMMERCIAL

## 2024-03-25 ENCOUNTER — HOSPITAL ENCOUNTER (OUTPATIENT)
Facility: HOSPITAL | Age: 54
Setting detail: INFUSION SERIES
Discharge: HOME | End: 2024-03-25
Payer: COMMERCIAL

## 2024-03-25 VITALS
TEMPERATURE: 98.2 F | HEART RATE: 52 BPM | BODY MASS INDEX: 21.56 KG/M2 | SYSTOLIC BLOOD PRESSURE: 148 MMHG | OXYGEN SATURATION: 98 % | RESPIRATION RATE: 16 BRPM | HEIGHT: 58 IN | WEIGHT: 102.7 LBS | DIASTOLIC BLOOD PRESSURE: 90 MMHG

## 2024-03-25 VITALS
DIASTOLIC BLOOD PRESSURE: 83 MMHG | WEIGHT: 102 LBS | TEMPERATURE: 98 F | OXYGEN SATURATION: 98 % | HEART RATE: 61 BPM | RESPIRATION RATE: 16 BRPM | SYSTOLIC BLOOD PRESSURE: 144 MMHG | BODY MASS INDEX: 21.32 KG/M2

## 2024-03-25 DIAGNOSIS — M65.311 TRIGGER THUMB OF BOTH THUMBS: ICD-10-CM

## 2024-03-25 DIAGNOSIS — M65.312 TRIGGER THUMB OF BOTH THUMBS: ICD-10-CM

## 2024-03-25 DIAGNOSIS — Z17.0 MALIGNANT NEOPLASM OF CENTRAL PORTION OF LEFT BREAST IN FEMALE, ESTROGEN RECEPTOR POSITIVE (HCC): Primary | ICD-10-CM

## 2024-03-25 DIAGNOSIS — C50.919 MALIGNANT NEOPLASM OF BREAST, STAGE 1, UNSPECIFIED ESTROGEN RECEPTOR STATUS, UNSPECIFIED LATERALITY (HCC): Primary | ICD-10-CM

## 2024-03-25 DIAGNOSIS — C50.112 MALIGNANT NEOPLASM OF CENTRAL PORTION OF LEFT BREAST IN FEMALE, ESTROGEN RECEPTOR POSITIVE (HCC): Primary | ICD-10-CM

## 2024-03-25 LAB
ALBUMIN SERPL-MCNC: 3.6 G/DL (ref 3.5–5)
ALBUMIN/GLOB SERPL: 0.9 (ref 1.1–2.2)
ALP SERPL-CCNC: 62 U/L (ref 45–117)
ALT SERPL-CCNC: 28 U/L (ref 12–78)
ANION GAP SERPL CALC-SCNC: 5 MMOL/L (ref 5–15)
AST SERPL-CCNC: 20 U/L (ref 15–37)
BASOPHILS # BLD: 0.1 K/UL (ref 0–0.1)
BASOPHILS NFR BLD: 1 % (ref 0–1)
BILIRUB SERPL-MCNC: 0.2 MG/DL (ref 0.2–1)
BUN SERPL-MCNC: 25 MG/DL (ref 6–20)
BUN/CREAT SERPL: 30 (ref 12–20)
CALCIUM SERPL-MCNC: 9.5 MG/DL (ref 8.5–10.1)
CHLORIDE SERPL-SCNC: 107 MMOL/L (ref 97–108)
CO2 SERPL-SCNC: 27 MMOL/L (ref 21–32)
CREAT SERPL-MCNC: 0.84 MG/DL (ref 0.55–1.02)
DIFFERENTIAL METHOD BLD: ABNORMAL
EOSINOPHIL # BLD: 0.1 K/UL (ref 0–0.4)
EOSINOPHIL NFR BLD: 3 % (ref 0–7)
ERYTHROCYTE [DISTWIDTH] IN BLOOD BY AUTOMATED COUNT: 13.2 % (ref 11.5–14.5)
GLOBULIN SER CALC-MCNC: 3.8 G/DL (ref 2–4)
GLUCOSE SERPL-MCNC: 130 MG/DL (ref 65–100)
HCT VFR BLD AUTO: 37.7 % (ref 35–47)
HGB BLD-MCNC: 12.9 G/DL (ref 11.5–16)
IMM GRANULOCYTES # BLD AUTO: 0 K/UL (ref 0–0.04)
IMM GRANULOCYTES NFR BLD AUTO: 0 % (ref 0–0.5)
LYMPHOCYTES # BLD: 1.5 K/UL (ref 0.8–3.5)
LYMPHOCYTES NFR BLD: 40 % (ref 12–49)
MCH RBC QN AUTO: 33.3 PG (ref 26–34)
MCHC RBC AUTO-ENTMCNC: 34.2 G/DL (ref 30–36.5)
MCV RBC AUTO: 97.4 FL (ref 80–99)
MONOCYTES # BLD: 0.4 K/UL (ref 0–1)
MONOCYTES NFR BLD: 10 % (ref 5–13)
NEUTS SEG # BLD: 1.7 K/UL (ref 1.8–8)
NEUTS SEG NFR BLD: 46 % (ref 32–75)
NRBC # BLD: 0 K/UL (ref 0–0.01)
NRBC BLD-RTO: 0 PER 100 WBC
PLATELET # BLD AUTO: 314 K/UL (ref 150–400)
PMV BLD AUTO: 9.5 FL (ref 8.9–12.9)
POTASSIUM SERPL-SCNC: 3.7 MMOL/L (ref 3.5–5.1)
PROT SERPL-MCNC: 7.4 G/DL (ref 6.4–8.2)
RBC # BLD AUTO: 3.87 M/UL (ref 3.8–5.2)
SODIUM SERPL-SCNC: 139 MMOL/L (ref 136–145)
WBC # BLD AUTO: 3.8 K/UL (ref 3.6–11)

## 2024-03-25 PROCEDURE — 36415 COLL VENOUS BLD VENIPUNCTURE: CPT

## 2024-03-25 PROCEDURE — 80053 COMPREHEN METABOLIC PANEL: CPT

## 2024-03-25 PROCEDURE — 6360000002 HC RX W HCPCS: Performed by: INTERNAL MEDICINE

## 2024-03-25 PROCEDURE — 85025 COMPLETE CBC W/AUTO DIFF WBC: CPT

## 2024-03-25 PROCEDURE — 99215 OFFICE O/P EST HI 40 MIN: CPT | Performed by: INTERNAL MEDICINE

## 2024-03-25 PROCEDURE — 96402 CHEMO HORMON ANTINEOPL SQ/IM: CPT

## 2024-03-25 RX ORDER — FAMOTIDINE 10 MG/ML
20 INJECTION, SOLUTION INTRAVENOUS
OUTPATIENT
Start: 2024-04-22

## 2024-03-25 RX ORDER — DIPHENHYDRAMINE HYDROCHLORIDE 50 MG/ML
50 INJECTION INTRAMUSCULAR; INTRAVENOUS
OUTPATIENT
Start: 2024-04-22

## 2024-03-25 RX ORDER — ACETAMINOPHEN 325 MG/1
650 TABLET ORAL
OUTPATIENT
Start: 2024-04-22

## 2024-03-25 RX ORDER — EPINEPHRINE 1 MG/ML
0.3 INJECTION, SOLUTION INTRAMUSCULAR; SUBCUTANEOUS PRN
OUTPATIENT
Start: 2024-04-22

## 2024-03-25 RX ORDER — ALBUTEROL SULFATE 90 UG/1
4 AEROSOL, METERED RESPIRATORY (INHALATION) PRN
OUTPATIENT
Start: 2024-04-22

## 2024-03-25 RX ORDER — ONDANSETRON 2 MG/ML
8 INJECTION INTRAMUSCULAR; INTRAVENOUS
OUTPATIENT
Start: 2024-04-22

## 2024-03-25 RX ORDER — AMPICILLIN TRIHYDRATE 250 MG
CAPSULE ORAL
COMMUNITY

## 2024-03-25 RX ORDER — SODIUM CHLORIDE 9 MG/ML
INJECTION, SOLUTION INTRAVENOUS CONTINUOUS
OUTPATIENT
Start: 2024-04-22

## 2024-03-25 RX ADMIN — LEUPROLIDE ACETATE 3.75 MG: KIT at 13:22

## 2024-03-25 ASSESSMENT — PAIN SCALES - GENERAL: PAINLEVEL_OUTOF10: 0

## 2024-03-25 NOTE — PROGRESS NOTES
[facility] Progress Note    Date: 2024    Name: Lindsay Schuler    MRN: [id number]         : 1970    Ms. Schuler arrived ambulatory and in no distress for Lupron Injection and labs.  Assessment was completed, stated it has become more difficult to use thumbs (bending them).  Labs drawn peripherally from right arm and sent for processing.      Ms. Schuler's vitals were reviewed.  Vitals:    24 1300   BP: (!) 148/90   Pulse: 52   Resp: 16   Temp: 98.2 °F (36.8 °C)   SpO2: 98%     Recent Results (from the past 12 hour(s))   CBC with Auto Differential    Collection Time: 24  1:11 PM   Result Value Ref Range    WBC 3.8 3.6 - 11.0 K/uL    RBC 3.87 3.80 - 5.20 M/uL    Hemoglobin 12.9 11.5 - 16.0 g/dL    Hematocrit 37.7 35.0 - 47.0 %    MCV 97.4 80.0 - 99.0 FL    MCH 33.3 26.0 - 34.0 PG    MCHC 34.2 30.0 - 36.5 g/dL    RDW 13.2 11.5 - 14.5 %    Platelets 314 150 - 400 K/uL    MPV 9.5 8.9 - 12.9 FL    Nucleated RBCs 0.0 0  WBC    nRBC 0.00 0.00 - 0.01 K/uL    Neutrophils % 46 32 - 75 %    Lymphocytes % 40 12 - 49 %    Monocytes % 10 5 - 13 %    Eosinophils % 3 0 - 7 %    Basophils % 1 0 - 1 %    Immature Granulocytes 0 0.0 - 0.5 %    Neutrophils Absolute 1.7 (L) 1.8 - 8.0 K/UL    Lymphocytes Absolute 1.5 0.8 - 3.5 K/UL    Monocytes Absolute 0.4 0.0 - 1.0 K/UL    Eosinophils Absolute 0.1 0.0 - 0.4 K/UL    Basophils Absolute 0.1 0.0 - 0.1 K/UL    Absolute Immature Granulocyte 0.0 0.00 - 0.04 K/UL    Differential Type AUTOMATED     Comprehensive Metabolic Panel    Collection Time: 24  1:11 PM   Result Value Ref Range    Sodium 139 136 - 145 mmol/L    Potassium 3.7 3.5 - 5.1 mmol/L    Chloride 107 97 - 108 mmol/L    CO2 27 21 - 32 mmol/L    Anion Gap 5 5 - 15 mmol/L    Glucose 130 (H) 65 - 100 mg/dL    BUN 25 (H) 6 - 20 MG/DL    Creatinine 0.84 0.55 - 1.02 MG/DL    Bun/Cre Ratio 30 (H) 12 - 20      Est, Glom Filt Rate 84 >60 ml/min/1.73m2    Calcium 9.5 8.5 - 10.1 MG/DL    Total Bilirubin 0.2 0.2

## 2024-03-25 NOTE — PROGRESS NOTES
Lindsay Schuler is a 53 y.o. female here today to follow up for breast cancer    1. Have you been to the ER, urgent care clinic since your last visit?  Hospitalized since your last visit?no    2. Have you seen or consulted any other health care providers outside of the [facility] since your last visit?  Include any pap smears or colon screening. no    
images.  BI-RADS Category 2 - Benign findings.   .  RECOMMENDATION:  Given the patient's adamant refusal for mammography, recommend yearly screening  MRI performed given her high risk. Additionally, I discussed the possibility of  performing automated breast ultrasound to supplement MRI screening, especially  if screening is only performed every other year with MRI.     11/30/23 MRI breast  FINDINGS:  There is minimal background parenchymal enhancement and extreme fibroglandular  tissue in the right breast.     Left breast:  Post nipple sparing mastectomy and prepectoral implant reconstruction. A 7 mm,  round, irregular mass at 11:00 in the left breast adjacent to the pectoralis  muscle and implant is new from 2016 and shows mixed washout and plateau  kinetics. No axillary or internal mammary chain lymphadenopathy.     Right breast:  Post prepectoral saline implant. At 6:00 in the middle to posterior third, and  area of non-masslike, irregular enhancement is new from 2016 and measures 4 x 8  mm. It shows mixed plateau and persistent headaches. No axillary or internal  mammary chain lymphadenopathy.     A summary portfolio with key images has been sent from kinetic analysis software  to PACS.     IMPRESSION:  IMPRESSION:   1. Left breast mass with washout kinetics (7 mm). BI-RADS 4.  2. Right breast non-masslike enhancement (4 x 8 mm). BI-RADS 4.  3. Overall assessment: BI-RADS Assessment Category 4: Suspicious abnormality.   4. Recommendation: Bilateral second look ultrasound and ultrasound-guided  biopsy. MRI guided biopsy of these masses may not be possible with implants in  place.    11/2/23 bone scan  FINDINGS:   Right clavicular head biopsied metastasis remains intensely active.  Cervical focus of activity is increased, likely metastatic rather than  degenerative.  Increased activity in the right fourth rib posteriorly is compatible with  metastasis.  New foci of activity projecting in the posterior upper chest

## 2024-03-26 RX ORDER — DULOXETIN HYDROCHLORIDE 20 MG/1
20 CAPSULE, DELAYED RELEASE ORAL DAILY
Qty: 30 CAPSULE | Refills: 3 | Status: SHIPPED | OUTPATIENT
Start: 2024-03-26

## 2024-03-28 ENCOUNTER — TELEPHONE (OUTPATIENT)
Age: 54
End: 2024-03-28

## 2024-03-28 NOTE — TELEPHONE ENCOUNTER
Spoke with patient regarding the information below. She stated that she wants to wait and is going to give them a call to cancel the appointment.         [facility]  April 5th @ 11:15am   Dr. Harjeet Johnston    [address], [address]  [place] [address] [phone]

## 2024-04-08 ENCOUNTER — TELEPHONE (OUTPATIENT)
Age: 54
End: 2024-04-08

## 2024-04-08 RX ORDER — DULOXETIN HYDROCHLORIDE 30 MG/1
30 CAPSULE, DELAYED RELEASE ORAL DAILY
Qty: 30 CAPSULE | Refills: 5 | Status: SHIPPED | OUTPATIENT
Start: 2024-04-08

## 2024-04-08 NOTE — TELEPHONE ENCOUNTER
[facility] called to let the team know they denied the request for Kisqali.  #[phone]

## 2024-04-09 ENCOUNTER — CLINICAL DOCUMENTATION (OUTPATIENT)
Age: 54
End: 2024-04-09

## 2024-04-09 RX ORDER — RIBOCICLIB 200 MG/1
TABLET, FILM COATED ORAL
Qty: 63 EACH | Refills: 5 | Status: ACTIVE | OUTPATIENT
Start: 2024-04-09

## 2024-04-09 NOTE — PROGRESS NOTES
Lyudmila Chiu     LT    24  4:19 PM  Note     [facility] called to let the team know they denied the request for Kisqali.  CB#[phone]          : Called [facility] back. The representative (Katarina) advised that medication was not denied but the PA had  in March. Forwarded to Radha Cavazos via email to renew the PA.    Haylie Hwang, PharmD, BCPS

## 2024-04-10 ENCOUNTER — TELEPHONE (OUTPATIENT)
Age: 54
End: 2024-04-10

## 2024-04-10 NOTE — TELEPHONE ENCOUNTER
Vane with [facility] called stating the Kisquali will need a prior auth and the team can call [phone]    Vane # [phone]

## 2024-04-22 ENCOUNTER — OFFICE VISIT (OUTPATIENT)
Age: 54
End: 2024-04-22
Payer: COMMERCIAL

## 2024-04-22 ENCOUNTER — HOSPITAL ENCOUNTER (OUTPATIENT)
Facility: HOSPITAL | Age: 54
Setting detail: INFUSION SERIES
Discharge: HOME | End: 2024-04-22
Payer: COMMERCIAL

## 2024-04-22 VITALS
RESPIRATION RATE: 16 BRPM | DIASTOLIC BLOOD PRESSURE: 80 MMHG | SYSTOLIC BLOOD PRESSURE: 132 MMHG | OXYGEN SATURATION: 97 % | HEART RATE: 62 BPM | WEIGHT: 102 LBS | TEMPERATURE: 97.8 F | BODY MASS INDEX: 21.32 KG/M2

## 2024-04-22 VITALS
DIASTOLIC BLOOD PRESSURE: 88 MMHG | TEMPERATURE: 97.8 F | SYSTOLIC BLOOD PRESSURE: 143 MMHG | OXYGEN SATURATION: 96 % | BODY MASS INDEX: 21.51 KG/M2 | HEIGHT: 58 IN | WEIGHT: 102.5 LBS | HEART RATE: 66 BPM | RESPIRATION RATE: 16 BRPM

## 2024-04-22 DIAGNOSIS — C50.112 MALIGNANT NEOPLASM OF CENTRAL PORTION OF LEFT BREAST IN FEMALE, ESTROGEN RECEPTOR POSITIVE (HCC): Primary | ICD-10-CM

## 2024-04-22 DIAGNOSIS — C50.919 MALIGNANT NEOPLASM OF BREAST, STAGE 1, UNSPECIFIED ESTROGEN RECEPTOR STATUS, UNSPECIFIED LATERALITY (HCC): Primary | ICD-10-CM

## 2024-04-22 DIAGNOSIS — Z17.0 MALIGNANT NEOPLASM OF CENTRAL PORTION OF LEFT BREAST IN FEMALE, ESTROGEN RECEPTOR POSITIVE (HCC): Primary | ICD-10-CM

## 2024-04-22 LAB
ALBUMIN SERPL-MCNC: 3.7 G/DL (ref 3.5–5)
ALBUMIN/GLOB SERPL: 1 (ref 1.1–2.2)
ALP SERPL-CCNC: 64 U/L (ref 45–117)
ALT SERPL-CCNC: 24 U/L (ref 12–78)
ANION GAP SERPL CALC-SCNC: 5 MMOL/L (ref 5–15)
AST SERPL-CCNC: 17 U/L (ref 15–37)
BASOPHILS # BLD: 0.1 K/UL (ref 0–0.1)
BASOPHILS NFR BLD: 1 % (ref 0–1)
BILIRUB SERPL-MCNC: 0.3 MG/DL (ref 0.2–1)
BUN SERPL-MCNC: 19 MG/DL (ref 6–20)
BUN/CREAT SERPL: 19 (ref 12–20)
CALCIUM SERPL-MCNC: 9.3 MG/DL (ref 8.5–10.1)
CHLORIDE SERPL-SCNC: 106 MMOL/L (ref 97–108)
CO2 SERPL-SCNC: 27 MMOL/L (ref 21–32)
CREAT SERPL-MCNC: 0.98 MG/DL (ref 0.55–1.02)
DIFFERENTIAL METHOD BLD: NORMAL
EOSINOPHIL # BLD: 0.1 K/UL (ref 0–0.4)
EOSINOPHIL NFR BLD: 3 % (ref 0–7)
ERYTHROCYTE [DISTWIDTH] IN BLOOD BY AUTOMATED COUNT: 12.6 % (ref 11.5–14.5)
GLOBULIN SER CALC-MCNC: 3.8 G/DL (ref 2–4)
GLUCOSE SERPL-MCNC: 123 MG/DL (ref 65–100)
HCT VFR BLD AUTO: 40.4 % (ref 35–47)
HGB BLD-MCNC: 14 G/DL (ref 11.5–16)
IMM GRANULOCYTES # BLD AUTO: 0 K/UL (ref 0–0.04)
IMM GRANULOCYTES NFR BLD AUTO: 0 % (ref 0–0.5)
LYMPHOCYTES # BLD: 1.7 K/UL (ref 0.8–3.5)
LYMPHOCYTES NFR BLD: 38 % (ref 12–49)
MCH RBC QN AUTO: 33.1 PG (ref 26–34)
MCHC RBC AUTO-ENTMCNC: 34.7 G/DL (ref 30–36.5)
MCV RBC AUTO: 95.5 FL (ref 80–99)
MONOCYTES # BLD: 0.4 K/UL (ref 0–1)
MONOCYTES NFR BLD: 9 % (ref 5–13)
NEUTS SEG # BLD: 2.2 K/UL (ref 1.8–8)
NEUTS SEG NFR BLD: 49 % (ref 32–75)
NRBC # BLD: 0 K/UL (ref 0–0.01)
NRBC BLD-RTO: 0 PER 100 WBC
PLATELET # BLD AUTO: 288 K/UL (ref 150–400)
PMV BLD AUTO: 9 FL (ref 8.9–12.9)
POTASSIUM SERPL-SCNC: 4.1 MMOL/L (ref 3.5–5.1)
PROT SERPL-MCNC: 7.5 G/DL (ref 6.4–8.2)
RBC # BLD AUTO: 4.23 M/UL (ref 3.8–5.2)
SODIUM SERPL-SCNC: 138 MMOL/L (ref 136–145)
WBC # BLD AUTO: 4.4 K/UL (ref 3.6–11)

## 2024-04-22 PROCEDURE — 36415 COLL VENOUS BLD VENIPUNCTURE: CPT

## 2024-04-22 PROCEDURE — 99215 OFFICE O/P EST HI 40 MIN: CPT | Performed by: INTERNAL MEDICINE

## 2024-04-22 PROCEDURE — 96402 CHEMO HORMON ANTINEOPL SQ/IM: CPT

## 2024-04-22 PROCEDURE — 6360000002 HC RX W HCPCS: Performed by: INTERNAL MEDICINE

## 2024-04-22 PROCEDURE — 80053 COMPREHEN METABOLIC PANEL: CPT

## 2024-04-22 PROCEDURE — 85025 COMPLETE CBC W/AUTO DIFF WBC: CPT

## 2024-04-22 RX ORDER — EPINEPHRINE 1 MG/ML
0.3 INJECTION, SOLUTION INTRAMUSCULAR; SUBCUTANEOUS PRN
OUTPATIENT
Start: 2024-05-20

## 2024-04-22 RX ORDER — ONDANSETRON 2 MG/ML
8 INJECTION INTRAMUSCULAR; INTRAVENOUS
OUTPATIENT
Start: 2024-05-20

## 2024-04-22 RX ORDER — ALBUTEROL SULFATE 90 UG/1
4 AEROSOL, METERED RESPIRATORY (INHALATION) PRN
OUTPATIENT
Start: 2024-05-20

## 2024-04-22 RX ORDER — ACETAMINOPHEN 325 MG/1
650 TABLET ORAL
OUTPATIENT
Start: 2024-05-20

## 2024-04-22 RX ORDER — FAMOTIDINE 10 MG/ML
20 INJECTION, SOLUTION INTRAVENOUS
OUTPATIENT
Start: 2024-05-20

## 2024-04-22 RX ORDER — SODIUM CHLORIDE 9 MG/ML
INJECTION, SOLUTION INTRAVENOUS CONTINUOUS
OUTPATIENT
Start: 2024-05-20

## 2024-04-22 RX ORDER — DIPHENHYDRAMINE HYDROCHLORIDE 50 MG/ML
50 INJECTION INTRAMUSCULAR; INTRAVENOUS
OUTPATIENT
Start: 2024-05-20

## 2024-04-22 RX ADMIN — LEUPROLIDE ACETATE 3.75 MG: KIT at 13:21

## 2024-04-22 ASSESSMENT — PAIN SCALES - GENERAL: PAINLEVEL_OUTOF10: 0

## 2024-04-22 NOTE — PROGRESS NOTES
Cancer [place] at [facility]  [address], [address] [place] [address]  W: [phone]  F: [phone]      Reason for Visit:   Lindsay Schuler is a 53 y.o. female who is seen in follow up for breast cancer.    Breast Surgeon: Dr. Veras    Treatment History:   9/3/15 left breast lumpectomy:  IDC 0.3 cm, gr 1-2, ER + at 95%, MT + at 30%, HER 2 negative at IHC 1+; DCIS present, solid, papillary, gr 2; pT1a pNx cM0  10/7/15  left breast core bx:  IDC, gr 2, ER + at 100%, MT + at 50%, HER 2 negative at IHC 0; DCIS gr 2-3, ER + at 100%, MT + at 20%  Mammaprint shows high risk luminal B 2015  12/1/15:  SLN bx:  0/6 LN  [facility]:  NBN pathologic mutation; BRCA2 VUS  12/15/15:  left breast mastectomy:  IDC, 0.4 cm, gr 2, HER 2 negative at IHC 0; extensive DCIS 1.4 cm, solid, gr 3, no LVI, 0/6 LN, pT1a pN0 cM0  Declined chemotherapy and endocrine therapy  5/17/23 left breast mass core bx:  IDC, gr 1-2, 1.3 cm, ER + at 95%, MT < 1%, HER 2 negative at IHC 0, ki67 5%; DCIS ER + at 95%, MT negative; + skeletal muscle involvement  Mammaprint shows high risk luminal B (-0.273) (2023)  6/30/23 right clavicle bone lesion:  metastatic breast cancer, ER +, weakly 1-10%, MT negative, HER 2 negative at IHC 0  7/17/24- lupron  Zometa 8/14/23- current  Letrozole 8/15/23- current  Ribociclib 400 mg (pt preference) 8/15/23- current    History of Present Illness:   In 2015, abnormal discharge led to the original pathology.  Mamogram 12/2014 was negative.  Surgical bx originally.  She states she noticed an abnormality in her L breast in 2016, MRI negative that year, MRI shows abnormality in 2018, US called it fat necrosis.  Mass grew this year, biopsy above.    Interval history: Patient presents today for follow up on letrozole and ribociclib. Reports gr 1 fatigue, gr 2 hot flashes, gr 1 loss of cognition and concentration, gr 1 joint pain      LMP 7/4/23    Past Medical History:   Diagnosis

## 2024-04-22 NOTE — PROGRESS NOTES
Lindsay Schuler is a 53 y.o. female here today to follow up for breast cancer    1. Have you been to the ER, urgent care clinic since your last visit?  Hospitalized since your last visit? no    2. Have you seen or consulted any other health care providers outside of the [facility] since your last visit?  Include any pap smears or colon screening.  no

## 2024-05-01 ENCOUNTER — PATIENT MESSAGE (OUTPATIENT)
Age: 54
End: 2024-05-01

## 2024-05-01 DIAGNOSIS — M25.50 AROMATASE INHIBITOR-ASSOCIATED ARTHRALGIA: Primary | ICD-10-CM

## 2024-05-01 DIAGNOSIS — T45.1X5A AROMATASE INHIBITOR-ASSOCIATED ARTHRALGIA: Primary | ICD-10-CM

## 2024-05-01 RX ORDER — DULOXETIN HYDROCHLORIDE 60 MG/1
60 CAPSULE, DELAYED RELEASE ORAL DAILY
Qty: 90 CAPSULE | Refills: 1 | Status: SHIPPED | OUTPATIENT
Start: 2024-05-01

## 2024-05-16 ENCOUNTER — HOSPITAL ENCOUNTER (OUTPATIENT)
Facility: HOSPITAL | Age: 54
End: 2024-05-16
Attending: INTERNAL MEDICINE
Payer: COMMERCIAL

## 2024-05-16 ENCOUNTER — HOSPITAL ENCOUNTER (OUTPATIENT)
Facility: HOSPITAL | Age: 54
Discharge: HOME | End: 2024-05-16
Attending: INTERNAL MEDICINE
Payer: COMMERCIAL

## 2024-05-16 DIAGNOSIS — C50.112 MALIGNANT NEOPLASM OF CENTRAL PORTION OF LEFT BREAST IN FEMALE, ESTROGEN RECEPTOR POSITIVE (HCC): ICD-10-CM

## 2024-05-16 DIAGNOSIS — Z17.0 MALIGNANT NEOPLASM OF CENTRAL PORTION OF LEFT BREAST IN FEMALE, ESTROGEN RECEPTOR POSITIVE (HCC): ICD-10-CM

## 2024-05-16 PROCEDURE — 3430000000 HC RX DIAGNOSTIC RADIOPHARMACEUTICAL: Performed by: INTERNAL MEDICINE

## 2024-05-16 PROCEDURE — A9503 TC99M MEDRONATE: HCPCS | Performed by: INTERNAL MEDICINE

## 2024-05-16 PROCEDURE — 74177 CT ABD & PELVIS W/CONTRAST: CPT

## 2024-05-16 PROCEDURE — 78306 BONE IMAGING WHOLE BODY: CPT | Performed by: INTERNAL MEDICINE

## 2024-05-16 PROCEDURE — 6360000004 HC RX CONTRAST MEDICATION: Performed by: INTERNAL MEDICINE

## 2024-05-16 RX ORDER — TC 99M MEDRONATE 20 MG/10ML
24 INJECTION, POWDER, LYOPHILIZED, FOR SOLUTION INTRAVENOUS
Status: COMPLETED | OUTPATIENT
Start: 2024-05-16 | End: 2024-05-16

## 2024-05-16 RX ADMIN — IOPAMIDOL 100 ML: 755 INJECTION, SOLUTION INTRAVENOUS at 08:28

## 2024-05-16 RX ADMIN — TC 99M MEDRONATE 24 MILLICURIE: 20 INJECTION, POWDER, LYOPHILIZED, FOR SOLUTION INTRAVENOUS at 08:15

## 2024-05-20 ENCOUNTER — HOSPITAL ENCOUNTER (OUTPATIENT)
Facility: HOSPITAL | Age: 54
Setting detail: INFUSION SERIES
Discharge: HOME | End: 2024-05-20
Payer: COMMERCIAL

## 2024-05-20 ENCOUNTER — OFFICE VISIT (OUTPATIENT)
Age: 54
End: 2024-05-20
Payer: COMMERCIAL

## 2024-05-20 VITALS
WEIGHT: 103.6 LBS | TEMPERATURE: 98.8 F | HEIGHT: 58 IN | HEART RATE: 72 BPM | OXYGEN SATURATION: 95 % | DIASTOLIC BLOOD PRESSURE: 72 MMHG | BODY MASS INDEX: 21.75 KG/M2 | SYSTOLIC BLOOD PRESSURE: 125 MMHG | RESPIRATION RATE: 16 BRPM

## 2024-05-20 VITALS
HEIGHT: 58 IN | DIASTOLIC BLOOD PRESSURE: 88 MMHG | TEMPERATURE: 98.8 F | BODY MASS INDEX: 21.81 KG/M2 | SYSTOLIC BLOOD PRESSURE: 145 MMHG | RESPIRATION RATE: 16 BRPM | WEIGHT: 103.9 LBS | HEART RATE: 72 BPM | OXYGEN SATURATION: 95 %

## 2024-05-20 DIAGNOSIS — C50.112 MALIGNANT NEOPLASM OF CENTRAL PORTION OF LEFT BREAST IN FEMALE, ESTROGEN RECEPTOR POSITIVE (HCC): Primary | ICD-10-CM

## 2024-05-20 DIAGNOSIS — Z17.0 MALIGNANT NEOPLASM OF CENTRAL PORTION OF LEFT BREAST IN FEMALE, ESTROGEN RECEPTOR POSITIVE (HCC): Primary | ICD-10-CM

## 2024-05-20 DIAGNOSIS — C50.919 MALIGNANT NEOPLASM OF BREAST, STAGE 1, UNSPECIFIED ESTROGEN RECEPTOR STATUS, UNSPECIFIED LATERALITY (HCC): Primary | ICD-10-CM

## 2024-05-20 LAB
ALBUMIN SERPL-MCNC: 3.7 G/DL (ref 3.5–5)
ALBUMIN/GLOB SERPL: 1 (ref 1.1–2.2)
ALP SERPL-CCNC: 75 U/L (ref 45–117)
ALT SERPL-CCNC: 25 U/L (ref 12–78)
ANION GAP SERPL CALC-SCNC: 1 MMOL/L (ref 5–15)
AST SERPL-CCNC: 23 U/L (ref 15–37)
BASOPHILS # BLD: 0 K/UL (ref 0–0.1)
BASOPHILS NFR BLD: 1 % (ref 0–1)
BILIRUB SERPL-MCNC: 0.2 MG/DL (ref 0.2–1)
BUN SERPL-MCNC: 20 MG/DL (ref 6–20)
BUN/CREAT SERPL: 19 (ref 12–20)
CALCIUM SERPL-MCNC: 9.3 MG/DL (ref 8.5–10.1)
CHLORIDE SERPL-SCNC: 108 MMOL/L (ref 97–108)
CO2 SERPL-SCNC: 30 MMOL/L (ref 21–32)
CREAT SERPL-MCNC: 1.06 MG/DL (ref 0.55–1.02)
DIFFERENTIAL METHOD BLD: ABNORMAL
EOSINOPHIL # BLD: 0.1 K/UL (ref 0–0.4)
EOSINOPHIL NFR BLD: 2 % (ref 0–7)
ERYTHROCYTE [DISTWIDTH] IN BLOOD BY AUTOMATED COUNT: 12.1 % (ref 11.5–14.5)
GLOBULIN SER CALC-MCNC: 3.8 G/DL (ref 2–4)
GLUCOSE SERPL-MCNC: 115 MG/DL (ref 65–100)
HCT VFR BLD AUTO: 38.7 % (ref 35–47)
HGB BLD-MCNC: 13.4 G/DL (ref 11.5–16)
IMM GRANULOCYTES # BLD AUTO: 0 K/UL (ref 0–0.04)
IMM GRANULOCYTES NFR BLD AUTO: 0 % (ref 0–0.5)
LYMPHOCYTES # BLD: 1.3 K/UL (ref 0.8–3.5)
LYMPHOCYTES NFR BLD: 37 % (ref 12–49)
MCH RBC QN AUTO: 33.3 PG (ref 26–34)
MCHC RBC AUTO-ENTMCNC: 34.6 G/DL (ref 30–36.5)
MCV RBC AUTO: 96 FL (ref 80–99)
MONOCYTES # BLD: 0.4 K/UL (ref 0–1)
MONOCYTES NFR BLD: 12 % (ref 5–13)
NEUTS SEG # BLD: 1.7 K/UL (ref 1.8–8)
NEUTS SEG NFR BLD: 48 % (ref 32–75)
NRBC # BLD: 0 K/UL (ref 0–0.01)
NRBC BLD-RTO: 0 PER 100 WBC
PLATELET # BLD AUTO: 240 K/UL (ref 150–400)
PMV BLD AUTO: 9 FL (ref 8.9–12.9)
POTASSIUM SERPL-SCNC: 4.4 MMOL/L (ref 3.5–5.1)
PROT SERPL-MCNC: 7.5 G/DL (ref 6.4–8.2)
RBC # BLD AUTO: 4.03 M/UL (ref 3.8–5.2)
SODIUM SERPL-SCNC: 139 MMOL/L (ref 136–145)
WBC # BLD AUTO: 3.5 K/UL (ref 3.6–11)

## 2024-05-20 PROCEDURE — 85025 COMPLETE CBC W/AUTO DIFF WBC: CPT

## 2024-05-20 PROCEDURE — 99215 OFFICE O/P EST HI 40 MIN: CPT | Performed by: NURSE PRACTITIONER

## 2024-05-20 PROCEDURE — 2580000003 HC RX 258: Performed by: INTERNAL MEDICINE

## 2024-05-20 PROCEDURE — 6360000002 HC RX W HCPCS: Performed by: NURSE PRACTITIONER

## 2024-05-20 PROCEDURE — 6360000002 HC RX W HCPCS: Performed by: INTERNAL MEDICINE

## 2024-05-20 PROCEDURE — 96365 THER/PROPH/DIAG IV INF INIT: CPT

## 2024-05-20 PROCEDURE — 80053 COMPREHEN METABOLIC PANEL: CPT

## 2024-05-20 PROCEDURE — 96402 CHEMO HORMON ANTINEOPL SQ/IM: CPT

## 2024-05-20 PROCEDURE — 2580000003 HC RX 258: Performed by: NURSE PRACTITIONER

## 2024-05-20 PROCEDURE — 36415 COLL VENOUS BLD VENIPUNCTURE: CPT

## 2024-05-20 RX ORDER — FAMOTIDINE 10 MG/ML
20 INJECTION, SOLUTION INTRAVENOUS
OUTPATIENT
Start: 2024-06-17

## 2024-05-20 RX ORDER — ONDANSETRON 2 MG/ML
8 INJECTION INTRAMUSCULAR; INTRAVENOUS
OUTPATIENT
Start: 2024-08-11

## 2024-05-20 RX ORDER — ZOLEDRONIC ACID 0.04 MG/ML
4 INJECTION, SOLUTION INTRAVENOUS ONCE
OUTPATIENT
Start: 2024-08-11 | End: 2024-08-11

## 2024-05-20 RX ORDER — SODIUM CHLORIDE 0.9 % (FLUSH) 0.9 %
5-40 SYRINGE (ML) INJECTION PRN
OUTPATIENT
Start: 2024-08-11

## 2024-05-20 RX ORDER — SODIUM CHLORIDE 9 MG/ML
5-250 INJECTION, SOLUTION INTRAVENOUS PRN
Status: DISCONTINUED | OUTPATIENT
Start: 2024-05-20 | End: 2024-05-21 | Stop reason: HOSPADM

## 2024-05-20 RX ORDER — EPINEPHRINE 1 MG/ML
0.3 INJECTION, SOLUTION INTRAMUSCULAR; SUBCUTANEOUS PRN
OUTPATIENT
Start: 2024-06-17

## 2024-05-20 RX ORDER — ALBUTEROL SULFATE 90 UG/1
4 AEROSOL, METERED RESPIRATORY (INHALATION) PRN
OUTPATIENT
Start: 2024-06-17

## 2024-05-20 RX ORDER — SODIUM CHLORIDE 9 MG/ML
INJECTION, SOLUTION INTRAVENOUS CONTINUOUS
OUTPATIENT
Start: 2024-06-17

## 2024-05-20 RX ORDER — SODIUM CHLORIDE 9 MG/ML
5-250 INJECTION, SOLUTION INTRAVENOUS PRN
OUTPATIENT
Start: 2024-08-11

## 2024-05-20 RX ORDER — ACETAMINOPHEN 325 MG/1
650 TABLET ORAL
OUTPATIENT
Start: 2024-06-17

## 2024-05-20 RX ORDER — ALBUTEROL SULFATE 90 UG/1
4 AEROSOL, METERED RESPIRATORY (INHALATION) PRN
OUTPATIENT
Start: 2024-08-11

## 2024-05-20 RX ORDER — EPINEPHRINE 1 MG/ML
0.3 INJECTION, SOLUTION INTRAMUSCULAR; SUBCUTANEOUS PRN
OUTPATIENT
Start: 2024-08-11

## 2024-05-20 RX ORDER — DIPHENHYDRAMINE HYDROCHLORIDE 50 MG/ML
50 INJECTION INTRAMUSCULAR; INTRAVENOUS
OUTPATIENT
Start: 2024-06-17

## 2024-05-20 RX ORDER — FAMOTIDINE 10 MG/ML
20 INJECTION, SOLUTION INTRAVENOUS
OUTPATIENT
Start: 2024-08-11

## 2024-05-20 RX ORDER — ACETAMINOPHEN 325 MG/1
650 TABLET ORAL
OUTPATIENT
Start: 2024-08-11

## 2024-05-20 RX ORDER — SODIUM CHLORIDE 9 MG/ML
INJECTION, SOLUTION INTRAVENOUS CONTINUOUS
OUTPATIENT
Start: 2024-08-11

## 2024-05-20 RX ORDER — ONDANSETRON 2 MG/ML
8 INJECTION INTRAMUSCULAR; INTRAVENOUS
OUTPATIENT
Start: 2024-06-17

## 2024-05-20 RX ORDER — HEPARIN 100 UNIT/ML
500 SYRINGE INTRAVENOUS PRN
OUTPATIENT
Start: 2024-08-11

## 2024-05-20 RX ORDER — DIPHENHYDRAMINE HYDROCHLORIDE 50 MG/ML
50 INJECTION INTRAMUSCULAR; INTRAVENOUS
OUTPATIENT
Start: 2024-08-11

## 2024-05-20 RX ORDER — ZOLEDRONIC ACID 0.04 MG/ML
4 INJECTION, SOLUTION INTRAVENOUS ONCE
Status: DISCONTINUED | OUTPATIENT
Start: 2024-05-20 | End: 2024-05-20

## 2024-05-20 RX ADMIN — LEUPROLIDE ACETATE 3.75 MG: KIT at 15:39

## 2024-05-20 RX ADMIN — SODIUM CHLORIDE 25 ML/HR: 9 INJECTION, SOLUTION INTRAVENOUS at 14:59

## 2024-05-20 RX ADMIN — ZOLEDRONIC ACID 3.3 MG: 4 INJECTION INTRAVENOUS at 15:00

## 2024-05-20 ASSESSMENT — PAIN DESCRIPTION - LOCATION: LOCATION: WRIST

## 2024-05-20 ASSESSMENT — PAIN SCALES - GENERAL: PAINLEVEL_OUTOF10: 5

## 2024-05-20 ASSESSMENT — PAIN DESCRIPTION - ORIENTATION: ORIENTATION: RIGHT;LEFT

## 2024-05-20 NOTE — PROGRESS NOTES
Cancer [place] at [facility]  [address], [address] [place] [address]  W: [phone]  F: [phone]      Reason for Visit:   Lindsay Schuler is a 53 y.o. female who is seen in follow up for breast cancer.    Breast Surgeon: Dr. Veras    Treatment History:   9/3/15 left breast lumpectomy:  IDC 0.3 cm, gr 1-2, ER + at 95%, WA + at 30%, HER 2 negative at IHC 1+; DCIS present, solid, papillary, gr 2; pT1a pNx cM0  10/7/15  left breast core bx:  IDC, gr 2, ER + at 100%, WA + at 50%, HER 2 negative at IHC 0; DCIS gr 2-3, ER + at 100%, WA + at 20%  Mammaprint shows high risk luminal B 2015  12/1/15:  SLN bx:  0/6 LN  [facility]:  NBN pathologic mutation; BRCA2 VUS  12/15/15:  left breast mastectomy:  IDC, 0.4 cm, gr 2, HER 2 negative at IHC 0; extensive DCIS 1.4 cm, solid, gr 3, no LVI, 0/6 LN, pT1a pN0 cM0  Declined chemotherapy and endocrine therapy  5/17/23 left breast mass core bx:  IDC, gr 1-2, 1.3 cm, ER + at 95%, WA < 1%, HER 2 negative at IHC 0, ki67 5%; DCIS ER + at 95%, WA negative; + skeletal muscle involvement  Mammaprint shows high risk luminal B (-0.273) (2023)  6/30/23 right clavicle bone lesion:  metastatic breast cancer, ER +, weakly 1-10%, WA negative, HER 2 negative at IHC 0  7/17/24- lupron  Zometa 8/14/23- current  Letrozole 8/15/23- current  Ribociclib 400 mg (pt preference) 8/15/23- current    History of Present Illness:   In 2015, abnormal discharge led to the original pathology.  Mamogram 12/2014 was negative.  Surgical bx originally.  She states she noticed an abnormality in her L breast in 2016, MRI negative that year, MRI shows abnormality in 2018, US called it fat necrosis.  Mass grew this year, biopsy above.    Interval history: Patient presents today for follow up on letrozole and ribociclib. Reports gr 1 fatigue, gr 1 hair loss, gr 2 hot flashes, gr 1 insomnia, gr 1 loss of concentration, gr 2 pain/cramping rated 7/10 to wrists.       LMP

## 2024-05-20 NOTE — PROGRESS NOTES
Rm    Chief Complaint   Patient presents with    Chemotherapy        BP (!) 145/88 (Site: Right Upper Arm)   Pulse 72   Temp 98.8 °F (37.1 °C)   Resp 16   Ht 1.473 m (4' 10\")   Wt 47.1 kg (103 lb 14.4 oz)   SpO2 95%   BMI 21.72 kg/m²      1. Have you been to the ER, urgent care clinic since your last visit?  Hospitalized since your last visit? No     2. Have you seen or consulted any other health care providers outside of the [facility] since your last visit?  Include any pap smears or colon screening. No     Health Maintenance Due   Topic Date Due    Hepatitis B vaccine (1 of 3 - 3-dose series) Never done    COVID-19 Vaccine (1) Never done    Pneumococcal 0-64 years Vaccine (1 of 2 - PCV) Never done    Depression Monitoring  Never done    HIV screen  Never done    Hepatitis C screen  Never done    DTaP/Tdap/Td vaccine (1 - Tdap) Never done    Shingles vaccine (1 of 2) Never done    Cervical cancer screen  Never done    Lipids  Never done    Colorectal Cancer Screen  Never done             No data to display                 Failed to redirect to the Timeline version of the [employer].    Failed to redirect to the Timeline version of the [employer].            The patient is a 41y Female complaining of medical evaluation.

## 2024-05-20 NOTE — PROGRESS NOTES
[facility] Progress Note    Date: May 20, 2024    1300: Ms. Schuler Arrived ambulatory and in stable condition to the [facility] for Lupron Injection (LGM) + Zometa Infusion.  Assessment was completed, no new complaints voiced. 24G PIV placed to right arm with positive blood return. Labs drawn and sent for processing. Went to provider appointment with Medical Oncology. Returned from provider appointment. Labs reviewed. Criteria for treatment was met.    Patient has not had any recent dental procedures.     Ms. Schuler's vitals were reviewed.  Patient Vitals for the past 12 hrs:   Temp Pulse Resp BP SpO2   05/20/24 1300 98.8 °F (37.1 °C) 72 16 (!) 145/88 95 %     Lab results were obtained and reviewed.  Recent Results (from the past 12 hour(s))   CBC with Auto Differential    Collection Time: 05/20/24  1:06 PM   Result Value Ref Range    WBC 3.5 (L) 3.6 - 11.0 K/uL    RBC 4.03 3.80 - 5.20 M/uL    Hemoglobin 13.4 11.5 - 16.0 g/dL    Hematocrit 38.7 35.0 - 47.0 %    MCV 96.0 80.0 - 99.0 FL    MCH 33.3 26.0 - 34.0 PG    MCHC 34.6 30.0 - 36.5 g/dL    RDW 12.1 11.5 - 14.5 %    Platelets 240 150 - 400 K/uL    MPV 9.0 8.9 - 12.9 FL    Nucleated RBCs 0.0 0  WBC    nRBC 0.00 0.00 - 0.01 K/uL    Neutrophils % 48 32 - 75 %    Lymphocytes % 37 12 - 49 %    Monocytes % 12 5 - 13 %    Eosinophils % 2 0 - 7 %    Basophils % 1 0 - 1 %    Immature Granulocytes % 0 0.0 - 0.5 %    Neutrophils Absolute 1.7 (L) 1.8 - 8.0 K/UL    Lymphocytes Absolute 1.3 0.8 - 3.5 K/UL    Monocytes Absolute 0.4 0.0 - 1.0 K/UL    Eosinophils Absolute 0.1 0.0 - 0.4 K/UL    Basophils Absolute 0.0 0.0 - 0.1 K/UL    Immature Granulocytes Absolute 0.0 0.00 - 0.04 K/UL    Differential Type AUTOMATED     Comprehensive Metabolic Panel    Collection Time: 05/20/24  1:06 PM   Result Value Ref Range    Sodium 139 136 - 145 mmol/L    Potassium 4.4 3.5 - 5.1 mmol/L    Chloride 108 97 - 108 mmol/L    CO2 30 21 - 32 mmol/L    Anion Gap 1 (L) 5 - 15 mmol/L    Glucose 115 (H) 65 -  100 mg/dL    BUN 20 6 - 20 MG/DL    Creatinine 1.06 (H) 0.55 - 1.02 MG/DL    BUN/Creatinine Ratio 19 12 - 20      Est, Glom Filt Rate 64 >60 ml/min/1.73m2    Calcium 9.3 8.5 - 10.1 MG/DL    Total Bilirubin 0.2 0.2 - 1.0 MG/DL    ALT 25 12 - 78 U/L    AST 23 15 - 37 U/L    Alk Phosphatase 75 45 - 117 U/L    Total Protein 7.5 6.4 - 8.2 g/dL    Albumin 3.7 3.5 - 5.0 g/dL    Globulin 3.8 2.0 - 4.0 g/dL    Albumin/Globulin Ratio 1.0 (L) 1.1 - 2.2        Medications:  Medications Administered         0.9 % sodium chloride infusion Admin Date  05/20/2024 Action  New Bag Dose  25 mL/hr Rate  25 mL/hr Route  IntraVENous Administered By  Karine Cunningham RN        leuprolide (LUPRON) injection 3.75 mg Admin Date  05/20/2024 Action  Given Dose  3.75 mg Rate   Route  IntraMUSCular Administered By  Karine Cunningham RN        zoledronic acid (ZOMETA) 3.3 mg in sodium chloride 0.9 % 100 mL IVPB Admin Date  05/20/2024 Action  New Bag Dose  3.3 mg Rate  300 mL/hr Route  IntraVENous Administered By  Karine Cunningham RN           Given in LGM    Patient tolerated treatment well.  PIV maintained blood return throughout treatment. PIV removed and 2x2 with coban placed. Patient was discharged in stable condition. Patient is aware of next scheduled [facility] appointment on May 20, 2024 at 1330.    Future Appointments   Date Time Provider Department Center   5/20/2024  1:30 PM Brandon Saldana MD [facility] BS AMB   6/17/2024  1:30 PM [facility]   7/15/2024  1:30 PM [facility]   8/12/2024  1:00 PM [facility]   9/9/2024  1:00 PM SS FASTTRACK 2 [facility]         Karine Cunningham RN  May 20, 2024

## 2024-05-28 DIAGNOSIS — C50.919 CARCINOMA OF BREAST METASTATIC TO BONE, UNSPECIFIED LATERALITY (HCC): ICD-10-CM

## 2024-05-28 DIAGNOSIS — C50.112 MALIGNANT NEOPLASM OF CENTRAL PORTION OF LEFT BREAST IN FEMALE, ESTROGEN RECEPTOR POSITIVE (HCC): Primary | ICD-10-CM

## 2024-05-28 DIAGNOSIS — C79.51 CARCINOMA OF BREAST METASTATIC TO BONE, UNSPECIFIED LATERALITY (HCC): ICD-10-CM

## 2024-05-28 DIAGNOSIS — Z17.0 MALIGNANT NEOPLASM OF CENTRAL PORTION OF LEFT BREAST IN FEMALE, ESTROGEN RECEPTOR POSITIVE (HCC): Primary | ICD-10-CM

## 2024-05-28 RX ORDER — ANASTROZOLE 1 MG/1
1 TABLET ORAL DAILY
Qty: 30 TABLET | Refills: 3 | Status: SHIPPED | OUTPATIENT
Start: 2024-05-28

## 2024-06-17 ENCOUNTER — HOSPITAL ENCOUNTER (OUTPATIENT)
Facility: HOSPITAL | Age: 54
Setting detail: INFUSION SERIES
Discharge: HOME | End: 2024-06-17
Payer: COMMERCIAL

## 2024-06-17 ENCOUNTER — OFFICE VISIT (OUTPATIENT)
Age: 54
End: 2024-06-17
Payer: COMMERCIAL

## 2024-06-17 VITALS
HEART RATE: 65 BPM | OXYGEN SATURATION: 100 % | DIASTOLIC BLOOD PRESSURE: 81 MMHG | TEMPERATURE: 98.7 F | SYSTOLIC BLOOD PRESSURE: 145 MMHG | RESPIRATION RATE: 16 BRPM

## 2024-06-17 VITALS
TEMPERATURE: 98.7 F | HEIGHT: 58 IN | SYSTOLIC BLOOD PRESSURE: 154 MMHG | RESPIRATION RATE: 16 BRPM | HEART RATE: 65 BPM | WEIGHT: 101.9 LBS | DIASTOLIC BLOOD PRESSURE: 81 MMHG | BODY MASS INDEX: 21.39 KG/M2 | OXYGEN SATURATION: 99 %

## 2024-06-17 DIAGNOSIS — C50.112 MALIGNANT NEOPLASM OF CENTRAL PORTION OF LEFT BREAST IN FEMALE, ESTROGEN RECEPTOR POSITIVE (HCC): Primary | ICD-10-CM

## 2024-06-17 DIAGNOSIS — T45.1X5A AROMATASE INHIBITOR-ASSOCIATED ARTHRALGIA: ICD-10-CM

## 2024-06-17 DIAGNOSIS — M25.50 AROMATASE INHIBITOR-ASSOCIATED ARTHRALGIA: ICD-10-CM

## 2024-06-17 DIAGNOSIS — C50.919 MALIGNANT NEOPLASM OF BREAST, STAGE 1, UNSPECIFIED ESTROGEN RECEPTOR STATUS, UNSPECIFIED LATERALITY (HCC): Primary | ICD-10-CM

## 2024-06-17 DIAGNOSIS — Z17.0 MALIGNANT NEOPLASM OF CENTRAL PORTION OF LEFT BREAST IN FEMALE, ESTROGEN RECEPTOR POSITIVE (HCC): Primary | ICD-10-CM

## 2024-06-17 LAB
ALBUMIN SERPL-MCNC: 3.7 G/DL (ref 3.5–5)
ALBUMIN/GLOB SERPL: 1.1 (ref 1.1–2.2)
ALP SERPL-CCNC: 67 U/L (ref 45–117)
ALT SERPL-CCNC: 31 U/L (ref 12–78)
ANION GAP SERPL CALC-SCNC: 6 MMOL/L (ref 5–15)
AST SERPL-CCNC: 26 U/L (ref 15–37)
BASOPHILS # BLD: 0.1 K/UL (ref 0–0.1)
BASOPHILS NFR BLD: 2 % (ref 0–1)
BILIRUB SERPL-MCNC: 0.3 MG/DL (ref 0.2–1)
BUN SERPL-MCNC: 24 MG/DL (ref 6–20)
BUN/CREAT SERPL: 27 (ref 12–20)
CALCIUM SERPL-MCNC: 9.7 MG/DL (ref 8.5–10.1)
CHLORIDE SERPL-SCNC: 108 MMOL/L (ref 97–108)
CO2 SERPL-SCNC: 25 MMOL/L (ref 21–32)
COMMENT:: NORMAL
CREAT SERPL-MCNC: 0.9 MG/DL (ref 0.55–1.02)
DIFFERENTIAL METHOD BLD: ABNORMAL
EOSINOPHIL # BLD: 0.1 K/UL (ref 0–0.4)
EOSINOPHIL NFR BLD: 3 % (ref 0–7)
ERYTHROCYTE [DISTWIDTH] IN BLOOD BY AUTOMATED COUNT: 12.5 % (ref 11.5–14.5)
GLOBULIN SER CALC-MCNC: 3.5 G/DL (ref 2–4)
GLUCOSE SERPL-MCNC: 122 MG/DL (ref 65–100)
HCT VFR BLD AUTO: 36 % (ref 35–47)
HGB BLD-MCNC: 12.6 G/DL (ref 11.5–16)
IMM GRANULOCYTES # BLD AUTO: 0 K/UL (ref 0–0.04)
IMM GRANULOCYTES NFR BLD AUTO: 0 % (ref 0–0.5)
LYMPHOCYTES # BLD: 1.8 K/UL (ref 0.8–3.5)
LYMPHOCYTES NFR BLD: 42 % (ref 12–49)
MCH RBC QN AUTO: 33.2 PG (ref 26–34)
MCHC RBC AUTO-ENTMCNC: 35 G/DL (ref 30–36.5)
MCV RBC AUTO: 94.7 FL (ref 80–99)
MONOCYTES # BLD: 0.6 K/UL (ref 0–1)
MONOCYTES NFR BLD: 13 % (ref 5–13)
NEUTS SEG # BLD: 1.7 K/UL (ref 1.8–8)
NEUTS SEG NFR BLD: 40 % (ref 32–75)
NRBC # BLD: 0 K/UL (ref 0–0.01)
NRBC BLD-RTO: 0 PER 100 WBC
PLATELET # BLD AUTO: 276 K/UL (ref 150–400)
PMV BLD AUTO: 9.3 FL (ref 8.9–12.9)
POTASSIUM SERPL-SCNC: 3.6 MMOL/L (ref 3.5–5.1)
PROT SERPL-MCNC: 7.2 G/DL (ref 6.4–8.2)
RBC # BLD AUTO: 3.8 M/UL (ref 3.8–5.2)
SODIUM SERPL-SCNC: 139 MMOL/L (ref 136–145)
SPECIMEN HOLD: NORMAL
WBC # BLD AUTO: 4.3 K/UL (ref 3.6–11)

## 2024-06-17 PROCEDURE — 85025 COMPLETE CBC W/AUTO DIFF WBC: CPT

## 2024-06-17 PROCEDURE — 96402 CHEMO HORMON ANTINEOPL SQ/IM: CPT

## 2024-06-17 PROCEDURE — 99215 OFFICE O/P EST HI 40 MIN: CPT | Performed by: NURSE PRACTITIONER

## 2024-06-17 PROCEDURE — 80053 COMPREHEN METABOLIC PANEL: CPT

## 2024-06-17 PROCEDURE — 6360000002 HC RX W HCPCS: Performed by: INTERNAL MEDICINE

## 2024-06-17 RX ORDER — DIPHENHYDRAMINE HYDROCHLORIDE 50 MG/ML
50 INJECTION INTRAMUSCULAR; INTRAVENOUS
OUTPATIENT
Start: 2024-07-15

## 2024-06-17 RX ORDER — DULOXETIN HYDROCHLORIDE 30 MG/1
30 CAPSULE, DELAYED RELEASE ORAL DAILY
Qty: 90 CAPSULE | Refills: 1 | Status: SHIPPED | OUTPATIENT
Start: 2024-06-17

## 2024-06-17 RX ORDER — SODIUM CHLORIDE 9 MG/ML
INJECTION, SOLUTION INTRAVENOUS CONTINUOUS
OUTPATIENT
Start: 2024-07-15

## 2024-06-17 RX ORDER — ALBUTEROL SULFATE 90 UG/1
4 AEROSOL, METERED RESPIRATORY (INHALATION) PRN
OUTPATIENT
Start: 2024-07-15

## 2024-06-17 RX ORDER — ACETAMINOPHEN 325 MG/1
650 TABLET ORAL
OUTPATIENT
Start: 2024-07-15

## 2024-06-17 RX ORDER — FAMOTIDINE 10 MG/ML
20 INJECTION, SOLUTION INTRAVENOUS
OUTPATIENT
Start: 2024-07-15

## 2024-06-17 RX ORDER — EPINEPHRINE 1 MG/ML
0.3 INJECTION, SOLUTION INTRAMUSCULAR; SUBCUTANEOUS PRN
OUTPATIENT
Start: 2024-07-15

## 2024-06-17 RX ORDER — ONDANSETRON 2 MG/ML
8 INJECTION INTRAMUSCULAR; INTRAVENOUS
OUTPATIENT
Start: 2024-07-15

## 2024-06-17 RX ADMIN — LEUPROLIDE ACETATE 3.75 MG: KIT at 13:56

## 2024-06-17 ASSESSMENT — PAIN DESCRIPTION - LOCATION: LOCATION: WRIST

## 2024-06-17 ASSESSMENT — PAIN DESCRIPTION - DESCRIPTORS: DESCRIPTORS: ACHING

## 2024-06-17 ASSESSMENT — PAIN SCALES - GENERAL: PAINLEVEL_OUTOF10: 7

## 2024-06-17 ASSESSMENT — PAIN DESCRIPTION - ORIENTATION: ORIENTATION: RIGHT;LEFT

## 2024-06-17 NOTE — PROGRESS NOTES
\[facility]\"" Progress Note    Date: June 17, 2024      1330: Ms. Schuler Arrived ambulatory and in stable condition to the \[facility]\"" for Lupron Injection (RGM).  Assessment was completed, no new complaints voiced. Peripheral labs drawn and sent for processing. Went to provider appointment with Medical Oncology. Returned from provider appointment. Labs reviewed. Criteria for treatment was met.    Ms. Schuler's vitals were reviewed.  Patient Vitals for the past 12 hrs:   Temp Pulse Resp BP SpO2   06/17/24 1330 98.7 °F (37.1 °C) 65 16 (!) 154/81 99 %       Lab results are pending.    Medications:  Medications Administered         leuprolide (LUPRON) injection 3.75 mg Admin Date  06/17/2024 Action  Given Dose  3.75 mg Route  IntraMUSCular Administered By  Karine Cunningham RN           Given in RGM today.    Patient tolerated treatment well. Patient was discharged in stable condition. Patient is aware of next scheduled \[facility]\"" appointment on July 15, 2024 at 1330.    Future Appointments   Date Time Provider Department Center   7/15/2024  1:30 PM [facility]   8/12/2024  1:00 PM [facility]   9/9/2024  1:00 PM SS FASTTRACK 2 [facility]       Karine Cunninhgam RN  June 17, 2024

## 2024-06-17 NOTE — PROGRESS NOTES
[facility] [place] at [facility]  [phone]    06/17/24 Lindsay Schuler is a 53 y.o. female is here for a follow up for breast cancer.    1. Have you been to the ER, urgent care clinic since your last visit?  Hospitalized since your last visit?No    2. Have you seen or consulted any other health care providers outside of the [facility] since your last visit?  Include any pap smears or colon screening. No      
Discussed zometa 4 mg IV q 3 months if + for bone mets.  We discussed side effects such as ONJ and the need to avoid invasive dental procedures while on these medications, renal damage, and hypocalcemia. Started on 8/14/23, last given 5/20/24.     3. 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 Pain and fever with zometa:  advised advil prior to next time, did not recur with next infusions.     6. Neutropenia:  due to ribo, mild    7. Muscle Cramping/Joint pain: continues with tart cherry juice and omega 3 fatty acids. On cymbalta 60 mg daily - she feels no improvement with increasing dose. Will decrease back down to 30mg daily.     No improvement with almost two week break from letrozole. Started anastrozole with no improvement.   Discussed seeing Dr. Johnston, she is scheduled 6/21/24.     8. Rash: to bilateral hip area, started on her trip but she believes due to cymbalta - reduced dose as above.      I appreciate the opportunity to participate in Ms. Lindsay Schuler's care.    Signed By: VERONICA Jose - NP      No follow-ups on file.

## 2024-07-02 ENCOUNTER — PATIENT MESSAGE (OUTPATIENT)
Age: 54
End: 2024-07-02

## 2024-07-02 DIAGNOSIS — C50.112 MALIGNANT NEOPLASM OF CENTRAL PORTION OF LEFT BREAST IN FEMALE, ESTROGEN RECEPTOR POSITIVE (HCC): Primary | ICD-10-CM

## 2024-07-02 DIAGNOSIS — F33.9 RECURRENT DEPRESSION (HCC): ICD-10-CM

## 2024-07-02 DIAGNOSIS — Z17.0 MALIGNANT NEOPLASM OF CENTRAL PORTION OF LEFT BREAST IN FEMALE, ESTROGEN RECEPTOR POSITIVE (HCC): Primary | ICD-10-CM

## 2024-07-03 RX ORDER — ESCITALOPRAM OXALATE 10 MG/1
10 TABLET ORAL DAILY
Qty: 90 TABLET | Refills: 1 | Status: SHIPPED | OUTPATIENT
Start: 2024-07-03

## 2024-07-10 ENCOUNTER — PATIENT MESSAGE (OUTPATIENT)
Age: 54
End: 2024-07-10

## 2024-07-10 DIAGNOSIS — C50.919 CARCINOMA OF BREAST METASTATIC TO BONE, UNSPECIFIED LATERALITY (HCC): ICD-10-CM

## 2024-07-10 DIAGNOSIS — C79.51 CARCINOMA OF BREAST METASTATIC TO BONE, UNSPECIFIED LATERALITY (HCC): ICD-10-CM

## 2024-07-10 DIAGNOSIS — C50.112 MALIGNANT NEOPLASM OF CENTRAL PORTION OF LEFT BREAST IN FEMALE, ESTROGEN RECEPTOR POSITIVE (HCC): Primary | ICD-10-CM

## 2024-07-10 DIAGNOSIS — Z17.0 MALIGNANT NEOPLASM OF CENTRAL PORTION OF LEFT BREAST IN FEMALE, ESTROGEN RECEPTOR POSITIVE (HCC): Primary | ICD-10-CM

## 2024-07-15 ENCOUNTER — OFFICE VISIT (OUTPATIENT)
Age: 54
End: 2024-07-15
Payer: COMMERCIAL

## 2024-07-15 ENCOUNTER — TELEPHONE (OUTPATIENT)
Age: 54
End: 2024-07-15

## 2024-07-15 ENCOUNTER — HOSPITAL ENCOUNTER (OUTPATIENT)
Facility: HOSPITAL | Age: 54
Setting detail: INFUSION SERIES
Discharge: HOME | End: 2024-07-15
Payer: COMMERCIAL

## 2024-07-15 VITALS
HEART RATE: 62 BPM | DIASTOLIC BLOOD PRESSURE: 63 MMHG | RESPIRATION RATE: 18 BRPM | OXYGEN SATURATION: 98 % | TEMPERATURE: 98 F | SYSTOLIC BLOOD PRESSURE: 122 MMHG | BODY MASS INDEX: 21.3 KG/M2 | HEIGHT: 58 IN

## 2024-07-15 VITALS
TEMPERATURE: 98 F | HEART RATE: 62 BPM | DIASTOLIC BLOOD PRESSURE: 63 MMHG | WEIGHT: 104.2 LBS | RESPIRATION RATE: 18 BRPM | HEIGHT: 58 IN | BODY MASS INDEX: 21.87 KG/M2 | SYSTOLIC BLOOD PRESSURE: 122 MMHG | OXYGEN SATURATION: 98 %

## 2024-07-15 DIAGNOSIS — C50.919 MALIGNANT NEOPLASM OF BREAST, STAGE 1, UNSPECIFIED ESTROGEN RECEPTOR STATUS, UNSPECIFIED LATERALITY (HCC): Primary | ICD-10-CM

## 2024-07-15 DIAGNOSIS — Z17.0 MALIGNANT NEOPLASM OF CENTRAL PORTION OF LEFT BREAST IN FEMALE, ESTROGEN RECEPTOR POSITIVE (HCC): Primary | ICD-10-CM

## 2024-07-15 DIAGNOSIS — C50.112 MALIGNANT NEOPLASM OF CENTRAL PORTION OF LEFT BREAST IN FEMALE, ESTROGEN RECEPTOR POSITIVE (HCC): Primary | ICD-10-CM

## 2024-07-15 LAB
ALBUMIN SERPL-MCNC: 3.6 G/DL (ref 3.5–5)
ALBUMIN/GLOB SERPL: 1 (ref 1.1–2.2)
ALP SERPL-CCNC: 74 U/L (ref 45–117)
ALT SERPL-CCNC: 28 U/L (ref 12–78)
ANION GAP SERPL CALC-SCNC: 4 MMOL/L (ref 5–15)
AST SERPL-CCNC: 20 U/L (ref 15–37)
BASOPHILS # BLD: 0.1 K/UL (ref 0–0.1)
BASOPHILS NFR BLD: 2 % (ref 0–1)
BILIRUB SERPL-MCNC: 0.2 MG/DL (ref 0.2–1)
BUN SERPL-MCNC: 28 MG/DL (ref 6–20)
BUN/CREAT SERPL: 31 (ref 12–20)
CALCIUM SERPL-MCNC: 9.2 MG/DL (ref 8.5–10.1)
CHLORIDE SERPL-SCNC: 105 MMOL/L (ref 97–108)
CO2 SERPL-SCNC: 29 MMOL/L (ref 21–32)
CREAT SERPL-MCNC: 0.91 MG/DL (ref 0.55–1.02)
DIFFERENTIAL METHOD BLD: ABNORMAL
EOSINOPHIL # BLD: 0.1 K/UL (ref 0–0.4)
EOSINOPHIL NFR BLD: 3 % (ref 0–7)
ERYTHROCYTE [DISTWIDTH] IN BLOOD BY AUTOMATED COUNT: 13.2 % (ref 11.5–14.5)
GLOBULIN SER CALC-MCNC: 3.7 G/DL (ref 2–4)
GLUCOSE SERPL-MCNC: 116 MG/DL (ref 65–100)
HCT VFR BLD AUTO: 36.9 % (ref 35–47)
HGB BLD-MCNC: 12.6 G/DL (ref 11.5–16)
IMM GRANULOCYTES # BLD AUTO: 0 K/UL (ref 0–0.04)
IMM GRANULOCYTES NFR BLD AUTO: 0 % (ref 0–0.5)
LYMPHOCYTES # BLD: 1.5 K/UL (ref 0.8–3.5)
LYMPHOCYTES NFR BLD: 45 % (ref 12–49)
MCH RBC QN AUTO: 33 PG (ref 26–34)
MCHC RBC AUTO-ENTMCNC: 34.1 G/DL (ref 30–36.5)
MCV RBC AUTO: 96.6 FL (ref 80–99)
MONOCYTES # BLD: 0.5 K/UL (ref 0–1)
MONOCYTES NFR BLD: 14 % (ref 5–13)
NEUTS SEG # BLD: 1.2 K/UL (ref 1.8–8)
NEUTS SEG NFR BLD: 36 % (ref 32–75)
NRBC # BLD: 0 K/UL (ref 0–0.01)
NRBC BLD-RTO: 0 PER 100 WBC
PLATELET # BLD AUTO: 269 K/UL (ref 150–400)
PMV BLD AUTO: 9.2 FL (ref 8.9–12.9)
POTASSIUM SERPL-SCNC: 4 MMOL/L (ref 3.5–5.1)
PROT SERPL-MCNC: 7.3 G/DL (ref 6.4–8.2)
RBC # BLD AUTO: 3.82 M/UL (ref 3.8–5.2)
SODIUM SERPL-SCNC: 138 MMOL/L (ref 136–145)
WBC # BLD AUTO: 3.3 K/UL (ref 3.6–11)

## 2024-07-15 PROCEDURE — 99215 OFFICE O/P EST HI 40 MIN: CPT | Performed by: INTERNAL MEDICINE

## 2024-07-15 PROCEDURE — 85025 COMPLETE CBC W/AUTO DIFF WBC: CPT

## 2024-07-15 PROCEDURE — 36415 COLL VENOUS BLD VENIPUNCTURE: CPT

## 2024-07-15 PROCEDURE — 80053 COMPREHEN METABOLIC PANEL: CPT

## 2024-07-15 PROCEDURE — 6360000002 HC RX W HCPCS: Performed by: INTERNAL MEDICINE

## 2024-07-15 PROCEDURE — 96402 CHEMO HORMON ANTINEOPL SQ/IM: CPT

## 2024-07-15 RX ADMIN — LEUPROLIDE ACETATE 3.75 MG: KIT at 13:39

## 2024-07-15 ASSESSMENT — PAIN SCALES - GENERAL: PAINLEVEL_OUTOF10: 0

## 2024-07-15 NOTE — PROGRESS NOTES
[facility] Progress Note    Date: July 15, 2024    Name: Lindsay Schuler    MRN: [id number]         : 1970    Ms. Schuler Arrived ambulatory and in no distress for Lupron (LGM) Injection.  Assessment was completed, no acute issues at this time, no new complaints voiced.  PIV labs drawn per orders and sent for processing.       Ms. Schuler's vitals were reviewed.  Vitals:    07/15/24 1325   BP: 122/63   Pulse: 62   Resp: 18   Temp: 98 °F (36.7 °C)   SpO2: 98%         Medications:  Medications Administered         leuprolide (LUPRON) injection 3.75 mg Admin Date  07/15/2024 Action  Given Dose  3.75 mg Route  IntraMUSCular Administered By  Ivonne Tejeda RN             Ms. Schuler tolerated treatment well and was discharged from [facility] in stable condition.   Patient is aware of future appointments.    Future Appointments   Date Time Provider Department Center   7/15/2024  2:00 PM Brandon Saldana MD [facility] BS AMB   2024  1:00 PM SS FASTTRACK 2 [facility]   2024  8:30 AM [facility] CT 2 [facility]   2024 10:30 AM [facility] NM INJ RM 1 [facility]   2024  1:30 PM SF NM RM 1 [facility]   2024  1:00 PM SS FASTTRACK 2 [facility]   10/7/2024  1:00 PM SS FASTTRACK 2 [facility]   2024  1:00 PM SS FASTTRACK 2 [facility]   2024  1:00 PM SS FASTTRACK 2 [facility]   2024  1:00 PM SS FASTTRACK 2 [facility]   2025  1:00 PM SS FASTTRACK 2 [facility]   2/10/2025  1:00 PM SS FASTTRACK 2 [facility]       Ivonne Tejeda RN  July 15, 2024

## 2024-07-15 NOTE — TELEPHONE ENCOUNTER
[facility] [place] at [facility]  [phone]    07/15/24 2:23 PM EDT - Called and left a message letting patient know that her labs look good and she is able to restart her ribociclib when scheduled.

## 2024-07-15 NOTE — PROGRESS NOTES
Cancer [place] at [facility]  [address], [address] [place] [address]  W: [phone]  F: [phone]      Reason for Visit:   Lindsay Schuler is a 54 y.o. female who is seen in follow up for breast cancer.    Breast Surgeon: Dr. Veras    Treatment History:   9/3/15 left breast lumpectomy:  IDC 0.3 cm, gr 1-2, ER + at 95%, ND + at 30%, HER 2 negative at IHC 1+; DCIS present, solid, papillary, gr 2; pT1a pNx cM0  10/7/15  left breast core bx:  IDC, gr 2, ER + at 100%, ND + at 50%, HER 2 negative at IHC 0; DCIS gr 2-3, ER + at 100%, ND + at 20%  Mammaprint shows high risk luminal B 2015  12/1/15:  SLN bx:  0/6 LN  [facility]:  NBN pathologic mutation; BRCA2 VUS  12/15/15:  left breast mastectomy:  IDC, 0.4 cm, gr 2, HER 2 negative at IHC 0; extensive DCIS 1.4 cm, solid, gr 3, no LVI, 0/6 LN, pT1a pN0 cM0  Declined chemotherapy and endocrine therapy  5/17/23 left breast mass core bx:  IDC, gr 1-2, 1.3 cm, ER + at 95%, ND < 1%, HER 2 negative at IHC 0, ki67 5%; DCIS ER + at 95%, ND negative; + skeletal muscle involvement  Mammaprint shows high risk luminal B (-0.273) (2023)  6/30/23 right clavicle bone lesion:  metastatic breast cancer, ER +, weakly 1-10%, ND negative, HER 2 negative at IHC 0  7/17/24- lupron  Zometa 8/14/23- current  Letrozole 8/15/23- 5/29/24 (joint pain)  Ribociclib 400 mg (pt preference) 8/15/23- current  Anastrozole 6/11/24 - current    History of Present Illness:   In 2015, abnormal discharge led to the original pathology.  Mamogram 12/2014 was negative.  Surgical bx originally.  She states she noticed an abnormality in her L breast in 2016, MRI negative that year, MRI shows abnormality in 2018, US called it fat necrosis.  Mass grew this year, biopsy above.    Interval history: Patient presents today for follow up on ribociclib. Reports gr 2 hot flashes, gr 1 insomnia, gr 1 loss of concentration    LMP 7/4/23    Past Medical History:   Diagnosis Date

## 2024-08-06 DIAGNOSIS — C50.919 MALIGNANT NEOPLASM OF BREAST, STAGE 1, UNSPECIFIED ESTROGEN RECEPTOR STATUS, UNSPECIFIED LATERALITY (HCC): Primary | ICD-10-CM

## 2024-08-06 RX ORDER — SODIUM CHLORIDE 9 MG/ML
INJECTION, SOLUTION INTRAVENOUS CONTINUOUS
OUTPATIENT
Start: 2024-08-12

## 2024-08-06 RX ORDER — DIPHENHYDRAMINE HYDROCHLORIDE 50 MG/ML
50 INJECTION INTRAMUSCULAR; INTRAVENOUS
OUTPATIENT
Start: 2024-08-12

## 2024-08-06 RX ORDER — ALBUTEROL SULFATE 90 UG/1
4 AEROSOL, METERED RESPIRATORY (INHALATION) PRN
OUTPATIENT
Start: 2024-08-12

## 2024-08-06 RX ORDER — ONDANSETRON 2 MG/ML
8 INJECTION INTRAMUSCULAR; INTRAVENOUS
OUTPATIENT
Start: 2024-08-12

## 2024-08-06 RX ORDER — SODIUM CHLORIDE 9 MG/ML
5-250 INJECTION, SOLUTION INTRAVENOUS PRN
OUTPATIENT
Start: 2024-08-12

## 2024-08-06 RX ORDER — FAMOTIDINE 10 MG/ML
20 INJECTION, SOLUTION INTRAVENOUS
OUTPATIENT
Start: 2024-08-12

## 2024-08-06 RX ORDER — HEPARIN SODIUM (PORCINE) LOCK FLUSH IV SOLN 100 UNIT/ML 100 UNIT/ML
500 SOLUTION INTRAVENOUS PRN
OUTPATIENT
Start: 2024-08-12

## 2024-08-06 RX ORDER — ZOLEDRONIC ACID 0.04 MG/ML
4 INJECTION, SOLUTION INTRAVENOUS ONCE
OUTPATIENT
Start: 2024-08-12 | End: 2024-08-12

## 2024-08-06 RX ORDER — SODIUM CHLORIDE 0.9 % (FLUSH) 0.9 %
5-40 SYRINGE (ML) INJECTION PRN
OUTPATIENT
Start: 2024-08-12

## 2024-08-06 RX ORDER — ACETAMINOPHEN 325 MG/1
650 TABLET ORAL
OUTPATIENT
Start: 2024-08-12

## 2024-08-06 RX ORDER — EPINEPHRINE 1 MG/ML
0.3 INJECTION, SOLUTION, CONCENTRATE INTRAVENOUS PRN
OUTPATIENT
Start: 2024-08-12

## 2024-08-12 ENCOUNTER — OFFICE VISIT (OUTPATIENT)
Age: 54
End: 2024-08-12
Payer: COMMERCIAL

## 2024-08-12 ENCOUNTER — HOSPITAL ENCOUNTER (OUTPATIENT)
Facility: HOSPITAL | Age: 54
Setting detail: INFUSION SERIES
Discharge: HOME | End: 2024-08-12
Payer: COMMERCIAL

## 2024-08-12 VITALS
WEIGHT: 105.4 LBS | HEIGHT: 58 IN | TEMPERATURE: 98 F | SYSTOLIC BLOOD PRESSURE: 139 MMHG | DIASTOLIC BLOOD PRESSURE: 85 MMHG | RESPIRATION RATE: 16 BRPM | HEART RATE: 65 BPM | BODY MASS INDEX: 22.13 KG/M2

## 2024-08-12 VITALS
SYSTOLIC BLOOD PRESSURE: 134 MMHG | TEMPERATURE: 98 F | WEIGHT: 105.4 LBS | BODY MASS INDEX: 22.13 KG/M2 | RESPIRATION RATE: 16 BRPM | HEIGHT: 58 IN | DIASTOLIC BLOOD PRESSURE: 75 MMHG | HEART RATE: 66 BPM | OXYGEN SATURATION: 97 %

## 2024-08-12 DIAGNOSIS — Z17.0 MALIGNANT NEOPLASM OF CENTRAL PORTION OF LEFT BREAST IN FEMALE, ESTROGEN RECEPTOR POSITIVE (HCC): Primary | ICD-10-CM

## 2024-08-12 DIAGNOSIS — C50.112 MALIGNANT NEOPLASM OF CENTRAL PORTION OF LEFT BREAST IN FEMALE, ESTROGEN RECEPTOR POSITIVE (HCC): Primary | ICD-10-CM

## 2024-08-12 DIAGNOSIS — C50.919 MALIGNANT NEOPLASM OF BREAST, STAGE 1, UNSPECIFIED ESTROGEN RECEPTOR STATUS, UNSPECIFIED LATERALITY (HCC): Primary | ICD-10-CM

## 2024-08-12 LAB
ALBUMIN SERPL-MCNC: 3.8 G/DL (ref 3.5–5)
ALBUMIN/GLOB SERPL: 1.1 (ref 1.1–2.2)
ALP SERPL-CCNC: 63 U/L (ref 45–117)
ALT SERPL-CCNC: 24 U/L (ref 12–78)
ANION GAP SERPL CALC-SCNC: 6 MMOL/L (ref 5–15)
AST SERPL-CCNC: 18 U/L (ref 15–37)
BASOPHILS # BLD: 0.1 K/UL (ref 0–0.1)
BASOPHILS NFR BLD: 1 % (ref 0–1)
BILIRUB SERPL-MCNC: 0.3 MG/DL (ref 0.2–1)
BUN SERPL-MCNC: 29 MG/DL (ref 6–20)
BUN/CREAT SERPL: 20 (ref 12–20)
CALCIUM SERPL-MCNC: 9.3 MG/DL (ref 8.5–10.1)
CHLORIDE SERPL-SCNC: 108 MMOL/L (ref 97–108)
CO2 SERPL-SCNC: 28 MMOL/L (ref 21–32)
CREAT SERPL-MCNC: 1.46 MG/DL (ref 0.55–1.02)
DIFFERENTIAL METHOD BLD: ABNORMAL
EOSINOPHIL # BLD: 0.1 K/UL (ref 0–0.4)
EOSINOPHIL NFR BLD: 1 % (ref 0–7)
ERYTHROCYTE [DISTWIDTH] IN BLOOD BY AUTOMATED COUNT: 13.2 % (ref 11.5–14.5)
GLOBULIN SER CALC-MCNC: 3.5 G/DL (ref 2–4)
GLUCOSE SERPL-MCNC: 154 MG/DL (ref 65–100)
HCT VFR BLD AUTO: 37.2 % (ref 35–47)
HGB BLD-MCNC: 12.9 G/DL (ref 11.5–16)
IMM GRANULOCYTES # BLD AUTO: 0 K/UL (ref 0–0.04)
IMM GRANULOCYTES NFR BLD AUTO: 0 % (ref 0–0.5)
LYMPHOCYTES # BLD: 1.4 K/UL (ref 0.8–3.5)
LYMPHOCYTES NFR BLD: 33 % (ref 12–49)
MCH RBC QN AUTO: 34.6 PG (ref 26–34)
MCHC RBC AUTO-ENTMCNC: 34.7 G/DL (ref 30–36.5)
MCV RBC AUTO: 99.7 FL (ref 80–99)
MONOCYTES # BLD: 0.4 K/UL (ref 0–1)
MONOCYTES NFR BLD: 9 % (ref 5–13)
NEUTS SEG # BLD: 2.3 K/UL (ref 1.8–8)
NEUTS SEG NFR BLD: 56 % (ref 32–75)
NRBC # BLD: 0 K/UL (ref 0–0.01)
NRBC BLD-RTO: 0 PER 100 WBC
PLATELET # BLD AUTO: 266 K/UL (ref 150–400)
PMV BLD AUTO: 9.3 FL (ref 8.9–12.9)
POTASSIUM SERPL-SCNC: 4.2 MMOL/L (ref 3.5–5.1)
PROT SERPL-MCNC: 7.3 G/DL (ref 6.4–8.2)
RBC # BLD AUTO: 3.73 M/UL (ref 3.8–5.2)
SODIUM SERPL-SCNC: 142 MMOL/L (ref 136–145)
WBC # BLD AUTO: 4.1 K/UL (ref 3.6–11)

## 2024-08-12 PROCEDURE — 96365 THER/PROPH/DIAG IV INF INIT: CPT

## 2024-08-12 PROCEDURE — 99215 OFFICE O/P EST HI 40 MIN: CPT | Performed by: INTERNAL MEDICINE

## 2024-08-12 PROCEDURE — 85025 COMPLETE CBC W/AUTO DIFF WBC: CPT

## 2024-08-12 PROCEDURE — 6360000002 HC RX W HCPCS: Performed by: NURSE PRACTITIONER

## 2024-08-12 PROCEDURE — 96402 CHEMO HORMON ANTINEOPL SQ/IM: CPT

## 2024-08-12 PROCEDURE — 36415 COLL VENOUS BLD VENIPUNCTURE: CPT

## 2024-08-12 PROCEDURE — 2580000003 HC RX 258: Performed by: NURSE PRACTITIONER

## 2024-08-12 PROCEDURE — 80053 COMPREHEN METABOLIC PANEL: CPT

## 2024-08-12 RX ORDER — EPINEPHRINE 1 MG/ML
0.3 INJECTION, SOLUTION INTRAMUSCULAR; SUBCUTANEOUS PRN
OUTPATIENT
Start: 2024-11-03

## 2024-08-12 RX ORDER — SODIUM CHLORIDE 9 MG/ML
INJECTION, SOLUTION INTRAVENOUS CONTINUOUS
OUTPATIENT
Start: 2024-09-09

## 2024-08-12 RX ORDER — ACETAMINOPHEN 325 MG/1
650 TABLET ORAL
OUTPATIENT
Start: 2024-11-03

## 2024-08-12 RX ORDER — ALBUTEROL SULFATE 90 UG/1
4 AEROSOL, METERED RESPIRATORY (INHALATION) PRN
OUTPATIENT
Start: 2024-09-09

## 2024-08-12 RX ORDER — HEPARIN 100 UNIT/ML
500 SYRINGE INTRAVENOUS PRN
OUTPATIENT
Start: 2024-11-03

## 2024-08-12 RX ORDER — SODIUM CHLORIDE 9 MG/ML
5-250 INJECTION, SOLUTION INTRAVENOUS PRN
OUTPATIENT
Start: 2024-11-03

## 2024-08-12 RX ORDER — FAMOTIDINE 10 MG/ML
20 INJECTION, SOLUTION INTRAVENOUS
OUTPATIENT
Start: 2024-11-03

## 2024-08-12 RX ORDER — ACETAMINOPHEN 325 MG/1
650 TABLET ORAL
OUTPATIENT
Start: 2024-09-09

## 2024-08-12 RX ORDER — SODIUM CHLORIDE 9 MG/ML
INJECTION, SOLUTION INTRAVENOUS CONTINUOUS
OUTPATIENT
Start: 2024-11-03

## 2024-08-12 RX ORDER — ONDANSETRON 2 MG/ML
8 INJECTION INTRAMUSCULAR; INTRAVENOUS
OUTPATIENT
Start: 2024-09-09

## 2024-08-12 RX ORDER — ALBUTEROL SULFATE 90 UG/1
4 AEROSOL, METERED RESPIRATORY (INHALATION) PRN
OUTPATIENT
Start: 2024-11-03

## 2024-08-12 RX ORDER — EPINEPHRINE 1 MG/ML
0.3 INJECTION, SOLUTION INTRAMUSCULAR; SUBCUTANEOUS PRN
OUTPATIENT
Start: 2024-09-09

## 2024-08-12 RX ORDER — ONDANSETRON 2 MG/ML
8 INJECTION INTRAMUSCULAR; INTRAVENOUS
OUTPATIENT
Start: 2024-11-03

## 2024-08-12 RX ORDER — DIPHENHYDRAMINE HYDROCHLORIDE 50 MG/ML
50 INJECTION INTRAMUSCULAR; INTRAVENOUS
OUTPATIENT
Start: 2024-09-09

## 2024-08-12 RX ORDER — DIPHENHYDRAMINE HYDROCHLORIDE 50 MG/ML
50 INJECTION INTRAMUSCULAR; INTRAVENOUS
OUTPATIENT
Start: 2024-11-03

## 2024-08-12 RX ORDER — FAMOTIDINE 10 MG/ML
20 INJECTION, SOLUTION INTRAVENOUS
OUTPATIENT
Start: 2024-09-09

## 2024-08-12 RX ORDER — ZOLEDRONIC ACID 0.04 MG/ML
4 INJECTION, SOLUTION INTRAVENOUS ONCE
OUTPATIENT
Start: 2024-11-03 | End: 2024-11-03

## 2024-08-12 RX ORDER — SODIUM CHLORIDE 9 MG/ML
5-250 INJECTION, SOLUTION INTRAVENOUS PRN
Status: DISCONTINUED | OUTPATIENT
Start: 2024-08-12 | End: 2024-08-13 | Stop reason: HOSPADM

## 2024-08-12 RX ORDER — SODIUM CHLORIDE 0.9 % (FLUSH) 0.9 %
5-40 SYRINGE (ML) INJECTION PRN
OUTPATIENT
Start: 2024-11-03

## 2024-08-12 RX ADMIN — ZOLEDRONIC ACID 3 MG: 4 INJECTION, SOLUTION, CONCENTRATE INTRAVENOUS at 15:04

## 2024-08-12 RX ADMIN — LEUPROLIDE ACETATE 3.75 MG: KIT at 14:56

## 2024-08-12 RX ADMIN — SODIUM CHLORIDE 25 ML/HR: 9 INJECTION, SOLUTION INTRAVENOUS at 15:02

## 2024-08-12 ASSESSMENT — PAIN SCALES - GENERAL: PAINLEVEL_OUTOF10: 4

## 2024-08-12 ASSESSMENT — PAIN DESCRIPTION - DESCRIPTORS: DESCRIPTORS: ACHING

## 2024-08-12 ASSESSMENT — PAIN DESCRIPTION - LOCATION: LOCATION: WRIST

## 2024-08-12 ASSESSMENT — PAIN DESCRIPTION - ORIENTATION: ORIENTATION: RIGHT;LEFT

## 2024-08-12 NOTE — PROGRESS NOTES
Cancer [place] at [facility]  [address], [address] [facility] [address]  W: [phone]  F: [phone]      Reason for Visit:   Lindsay Schuler is a 54 y.o. female who is seen in follow up for breast cancer.    Breast Surgeon: Dr. Veras    Treatment History:   9/3/15 left breast lumpectomy:  IDC 0.3 cm, gr 1-2, ER + at 95%, TX + at 30%, HER 2 negative at IHC 1+; DCIS present, solid, papillary, gr 2; pT1a pNx cM0  10/7/15  left breast core bx:  IDC, gr 2, ER + at 100%, TX + at 50%, HER 2 negative at IHC 0; DCIS gr 2-3, ER + at 100%, TX + at 20%  Mammaprint shows high risk luminal B 2015  12/1/15:  SLN bx:  0/6 LN  [facility]:  NBN pathologic mutation; BRCA2 VUS  12/15/15:  left breast mastectomy:  IDC, 0.4 cm, gr 2, HER 2 negative at IHC 0; extensive DCIS 1.4 cm, solid, gr 3, no LVI, 0/6 LN, pT1a pN0 cM0  Declined chemotherapy and endocrine therapy  5/17/23 left breast mass core bx:  IDC, gr 1-2, 1.3 cm, ER + at 95%, TX < 1%, HER 2 negative at IHC 0, ki67 5%; DCIS ER + at 95%, TX negative; + skeletal muscle involvement  Mammaprint shows high risk luminal B (-0.273) (2023)  6/30/23 right clavicle bone lesion:  metastatic breast cancer, ER +, weakly 1-10%, TX negative, HER 2 negative at IHC 0  7/17/24- lupron  Zometa 8/14/23- current  Letrozole 8/15/23- 5/29/24 (joint pain)  Ribociclib 400 mg (pt preference) 8/15/23- current  Anastrozole 6/11/24 - current    History of Present Illness:   In 2015, abnormal discharge led to the original pathology.  Mamogram 12/2014 was negative.  Surgical bx originally.  She states she noticed an abnormality in her L breast in 2016, MRI negative that year, MRI shows abnormality in 2018, US called it fat necrosis.  Mass grew this year, biopsy above.    Interval history: Patient presents today for follow up on ribociclib. Reports gr 2 hot flashes, gr 1 insomnia, gr 1 loss of concentration, 4-5/10 wrist pain    LMP 7/4/23    Past Medical

## 2024-08-12 NOTE — PROGRESS NOTES
[facility]   Visit Progress Note    Patient admitted to [facility] for Lupron and Zometa in stable condition. Assessment completed.  Labs were drawn and sent for processing. Pt proceeded to scheduled appt.      No new concerns voiced.  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     Pt denies pregnancy.    Pt denies having recent dental procedures in the past 4-6 weeks and states no plans for any in the near future. Pt verbalized understanding importance of notifying dentist of taking Zometa.  Pt denies jaw swelling or pain and recent onset/increased pain of the hips, groin or thigh.    VS  Vitals:    08/12/24 1245 08/12/24 1302 08/12/24 1533   BP:  134/75 139/85   Pulse:  66 65   Resp:  16    Temp:  98 °F (36.7 °C)    Weight: 47.8 kg (105 lb 6.4 oz)     Height: 1.473 m (4' 10\")           R AC PIV with positive blood return.     Medications:  Lupron given to M  Medications Administered         0.9 % sodium chloride infusion Admin Date  08/12/2024 Action  New Bag Dose  25 mL/hr Rate  25 mL/hr Route  IntraVENous Documented By  Claudette Jamil RN        leuprolide (LUPRON) injection 3.75 mg Admin Date  08/12/2024 Action  Given Dose  3.75 mg Rate   Route  IntraMUSCular Documented By  Claudette Jamil RN        zoledronic acid (ZOMETA) 3 mg in sodium chloride 0.9 % 100 mL IVPB Admin Date  08/12/2024 Action  New Bag Dose  3 mg Rate  300 mL/hr Route  IntraVENous Documented By  Claudette Jamil RN              Patient tolerated treatment well. Patient discharged from [facility] in no distress. Patient aware of next appointment.    Labs  Recent Results (from the past 12 hour(s))   CBC with Auto Differential    Collection Time: 08/12/24  1:04 PM   Result Value Ref Range    WBC 4.1 3.6 - 11.0 K/uL    RBC 3.73 (L) 3.80 - 5.20 M/uL    Hemoglobin 12.9 11.5 - 16.0 g/dL    Hematocrit 37.2 35.0 - 47.0 %

## 2024-08-13 ENCOUNTER — PATIENT MESSAGE (OUTPATIENT)
Age: 54
End: 2024-08-13

## 2024-08-13 DIAGNOSIS — R30.0 DYSURIA: Primary | ICD-10-CM

## 2024-08-22 DIAGNOSIS — C79.51 CARCINOMA OF BREAST METASTATIC TO BONE, UNSPECIFIED LATERALITY (HCC): ICD-10-CM

## 2024-08-22 DIAGNOSIS — C50.112 MALIGNANT NEOPLASM OF CENTRAL PORTION OF LEFT BREAST IN FEMALE, ESTROGEN RECEPTOR POSITIVE (HCC): ICD-10-CM

## 2024-08-22 DIAGNOSIS — C50.919 CARCINOMA OF BREAST METASTATIC TO BONE, UNSPECIFIED LATERALITY (HCC): ICD-10-CM

## 2024-08-22 DIAGNOSIS — Z17.0 MALIGNANT NEOPLASM OF CENTRAL PORTION OF LEFT BREAST IN FEMALE, ESTROGEN RECEPTOR POSITIVE (HCC): ICD-10-CM

## 2024-08-22 RX ORDER — ANASTROZOLE 1 MG/1
1 TABLET ORAL DAILY
Qty: 90 TABLET | Refills: 3 | Status: SHIPPED | OUTPATIENT
Start: 2024-08-22

## 2024-09-04 RX ORDER — IOPAMIDOL 755 MG/ML
100 INJECTION, SOLUTION INTRAVASCULAR
Status: COMPLETED | OUTPATIENT
Start: 2024-09-04 | End: 2024-09-05

## 2024-09-05 ENCOUNTER — HOSPITAL ENCOUNTER (OUTPATIENT)
Facility: HOSPITAL | Age: 54
Discharge: HOME | End: 2024-09-08
Payer: COMMERCIAL

## 2024-09-05 DIAGNOSIS — C79.51 CARCINOMA OF BREAST METASTATIC TO BONE, UNSPECIFIED LATERALITY (HCC): ICD-10-CM

## 2024-09-05 DIAGNOSIS — Z17.0 MALIGNANT NEOPLASM OF CENTRAL PORTION OF LEFT BREAST IN FEMALE, ESTROGEN RECEPTOR POSITIVE (HCC): ICD-10-CM

## 2024-09-05 DIAGNOSIS — C50.112 MALIGNANT NEOPLASM OF CENTRAL PORTION OF LEFT BREAST IN FEMALE, ESTROGEN RECEPTOR POSITIVE (HCC): ICD-10-CM

## 2024-09-05 DIAGNOSIS — C50.919 CARCINOMA OF BREAST METASTATIC TO BONE, UNSPECIFIED LATERALITY (HCC): ICD-10-CM

## 2024-09-05 PROCEDURE — 6360000004 HC RX CONTRAST MEDICATION: Performed by: NURSE PRACTITIONER

## 2024-09-05 PROCEDURE — A9503 TC99M MEDRONATE: HCPCS | Performed by: INTERNAL MEDICINE

## 2024-09-05 PROCEDURE — 71260 CT THORAX DX C+: CPT

## 2024-09-05 PROCEDURE — 78306 BONE IMAGING WHOLE BODY: CPT | Performed by: NURSE PRACTITIONER

## 2024-09-05 PROCEDURE — 3430000000 HC RX DIAGNOSTIC RADIOPHARMACEUTICAL: Performed by: INTERNAL MEDICINE

## 2024-09-05 RX ORDER — TC 99M MEDRONATE 20 MG/10ML
27 INJECTION, POWDER, LYOPHILIZED, FOR SOLUTION INTRAVENOUS ONCE
Status: COMPLETED | OUTPATIENT
Start: 2024-09-05 | End: 2024-09-05

## 2024-09-05 RX ADMIN — TC 99M MEDRONATE 27 MILLICURIE: 20 INJECTION, POWDER, LYOPHILIZED, FOR SOLUTION INTRAVENOUS at 09:00

## 2024-09-05 RX ADMIN — IOPAMIDOL 100 ML: 755 INJECTION, SOLUTION INTRAVENOUS at 08:47

## 2024-09-09 ENCOUNTER — OFFICE VISIT (OUTPATIENT)
Age: 54
End: 2024-09-09
Payer: COMMERCIAL

## 2024-09-09 ENCOUNTER — HOSPITAL ENCOUNTER (OUTPATIENT)
Facility: HOSPITAL | Age: 54
Setting detail: INFUSION SERIES
Discharge: HOME | End: 2024-09-09
Payer: COMMERCIAL

## 2024-09-09 VITALS
SYSTOLIC BLOOD PRESSURE: 139 MMHG | TEMPERATURE: 98.2 F | WEIGHT: 104.8 LBS | HEART RATE: 66 BPM | BODY MASS INDEX: 22 KG/M2 | OXYGEN SATURATION: 97 % | HEIGHT: 58 IN | DIASTOLIC BLOOD PRESSURE: 66 MMHG | RESPIRATION RATE: 18 BRPM

## 2024-09-09 VITALS
DIASTOLIC BLOOD PRESSURE: 66 MMHG | RESPIRATION RATE: 18 BRPM | SYSTOLIC BLOOD PRESSURE: 139 MMHG | HEART RATE: 66 BPM | OXYGEN SATURATION: 97 % | HEIGHT: 58 IN | BODY MASS INDEX: 22 KG/M2 | TEMPERATURE: 98.2 F | WEIGHT: 104.8 LBS

## 2024-09-09 DIAGNOSIS — C50.112 MALIGNANT NEOPLASM OF CENTRAL PORTION OF LEFT BREAST IN FEMALE, ESTROGEN RECEPTOR POSITIVE (HCC): Primary | ICD-10-CM

## 2024-09-09 DIAGNOSIS — C50.919 MALIGNANT NEOPLASM OF BREAST, STAGE 1, UNSPECIFIED ESTROGEN RECEPTOR STATUS, UNSPECIFIED LATERALITY (HCC): Primary | ICD-10-CM

## 2024-09-09 DIAGNOSIS — Z17.0 MALIGNANT NEOPLASM OF CENTRAL PORTION OF LEFT BREAST IN FEMALE, ESTROGEN RECEPTOR POSITIVE (HCC): Primary | ICD-10-CM

## 2024-09-09 LAB
ALBUMIN SERPL-MCNC: 3.6 G/DL (ref 3.5–5)
ALBUMIN/GLOB SERPL: 1.1 (ref 1.1–2.2)
ALP SERPL-CCNC: 61 U/L (ref 45–117)
ALT SERPL-CCNC: 28 U/L (ref 12–78)
ANION GAP SERPL CALC-SCNC: 7 MMOL/L (ref 2–12)
AST SERPL-CCNC: 21 U/L (ref 15–37)
BASOPHILS # BLD: 0.1 K/UL (ref 0–0.1)
BASOPHILS NFR BLD: 1 % (ref 0–1)
BILIRUB SERPL-MCNC: 0.2 MG/DL (ref 0.2–1)
BUN SERPL-MCNC: 23 MG/DL (ref 6–20)
BUN/CREAT SERPL: 18 (ref 12–20)
CALCIUM SERPL-MCNC: 8.9 MG/DL (ref 8.5–10.1)
CHLORIDE SERPL-SCNC: 108 MMOL/L (ref 97–108)
CO2 SERPL-SCNC: 22 MMOL/L (ref 21–32)
CREAT SERPL-MCNC: 1.28 MG/DL (ref 0.55–1.02)
DIFFERENTIAL METHOD BLD: ABNORMAL
EOSINOPHIL # BLD: 0.1 K/UL (ref 0–0.4)
EOSINOPHIL NFR BLD: 1 % (ref 0–7)
ERYTHROCYTE [DISTWIDTH] IN BLOOD BY AUTOMATED COUNT: 12.1 % (ref 11.5–14.5)
GLOBULIN SER CALC-MCNC: 3.4 G/DL (ref 2–4)
GLUCOSE SERPL-MCNC: 152 MG/DL (ref 65–100)
HCT VFR BLD AUTO: 36 % (ref 35–47)
HGB BLD-MCNC: 12.4 G/DL (ref 11.5–16)
IMM GRANULOCYTES # BLD AUTO: 0 K/UL (ref 0–0.04)
IMM GRANULOCYTES NFR BLD AUTO: 1 % (ref 0–0.5)
LYMPHOCYTES # BLD: 1.5 K/UL (ref 0.8–3.5)
LYMPHOCYTES NFR BLD: 27 % (ref 12–49)
MCH RBC QN AUTO: 33.8 PG (ref 26–34)
MCHC RBC AUTO-ENTMCNC: 34.4 G/DL (ref 30–36.5)
MCV RBC AUTO: 98.1 FL (ref 80–99)
MONOCYTES # BLD: 0.4 K/UL (ref 0–1)
MONOCYTES NFR BLD: 7 % (ref 5–13)
NEUTS SEG # BLD: 3.5 K/UL (ref 1.8–8)
NEUTS SEG NFR BLD: 63 % (ref 32–75)
NRBC # BLD: 0 K/UL (ref 0–0.01)
NRBC BLD-RTO: 0 PER 100 WBC
PLATELET # BLD AUTO: 287 K/UL (ref 150–400)
PMV BLD AUTO: 9.2 FL (ref 8.9–12.9)
POTASSIUM SERPL-SCNC: 3.8 MMOL/L (ref 3.5–5.1)
PROT SERPL-MCNC: 7 G/DL (ref 6.4–8.2)
RBC # BLD AUTO: 3.67 M/UL (ref 3.8–5.2)
SODIUM SERPL-SCNC: 137 MMOL/L (ref 136–145)
WBC # BLD AUTO: 5.5 K/UL (ref 3.6–11)

## 2024-09-09 PROCEDURE — 99215 OFFICE O/P EST HI 40 MIN: CPT | Performed by: INTERNAL MEDICINE

## 2024-09-09 PROCEDURE — G2211 COMPLEX E/M VISIT ADD ON: HCPCS | Performed by: INTERNAL MEDICINE

## 2024-09-09 PROCEDURE — 80053 COMPREHEN METABOLIC PANEL: CPT

## 2024-09-09 PROCEDURE — 6360000002 HC RX W HCPCS: Performed by: NURSE PRACTITIONER

## 2024-09-09 PROCEDURE — 85025 COMPLETE CBC W/AUTO DIFF WBC: CPT

## 2024-09-09 PROCEDURE — 36415 COLL VENOUS BLD VENIPUNCTURE: CPT

## 2024-09-09 PROCEDURE — 96402 CHEMO HORMON ANTINEOPL SQ/IM: CPT

## 2024-09-09 RX ORDER — FAMOTIDINE 10 MG/ML
20 INJECTION, SOLUTION INTRAVENOUS
OUTPATIENT
Start: 2024-10-07

## 2024-09-09 RX ORDER — SODIUM CHLORIDE 9 MG/ML
INJECTION, SOLUTION INTRAVENOUS CONTINUOUS
OUTPATIENT
Start: 2024-10-07

## 2024-09-09 RX ORDER — ALBUTEROL SULFATE 90 UG/1
4 INHALANT RESPIRATORY (INHALATION) PRN
OUTPATIENT
Start: 2024-10-07

## 2024-09-09 RX ORDER — EPINEPHRINE 1 MG/ML
0.3 INJECTION, SOLUTION INTRAMUSCULAR; SUBCUTANEOUS PRN
OUTPATIENT
Start: 2024-10-07

## 2024-09-09 RX ORDER — ACETAMINOPHEN 325 MG/1
650 TABLET ORAL
OUTPATIENT
Start: 2024-10-07

## 2024-09-09 RX ORDER — ONDANSETRON 2 MG/ML
8 INJECTION INTRAMUSCULAR; INTRAVENOUS
OUTPATIENT
Start: 2024-10-07

## 2024-09-09 RX ORDER — DIPHENHYDRAMINE HYDROCHLORIDE 50 MG/ML
50 INJECTION INTRAMUSCULAR; INTRAVENOUS
OUTPATIENT
Start: 2024-10-07

## 2024-09-09 RX ADMIN — LEUPROLIDE ACETATE 3.75 MG: KIT at 13:23

## 2024-09-09 ASSESSMENT — PATIENT HEALTH QUESTIONNAIRE - PHQ9
SUM OF ALL RESPONSES TO PHQ QUESTIONS 1-9: 0
SUM OF ALL RESPONSES TO PHQ QUESTIONS 1-9: 0
1. LITTLE INTEREST OR PLEASURE IN DOING THINGS: NOT AT ALL
SUM OF ALL RESPONSES TO PHQ9 QUESTIONS 1 & 2: 0
2. FEELING DOWN, DEPRESSED OR HOPELESS: NOT AT ALL
SUM OF ALL RESPONSES TO PHQ QUESTIONS 1-9: 0
SUM OF ALL RESPONSES TO PHQ QUESTIONS 1-9: 0

## 2024-09-09 ASSESSMENT — PAIN - FUNCTIONAL ASSESSMENT: PAIN_FUNCTIONAL_ASSESSMENT: ACTIVITIES ARE NOT PREVENTED

## 2024-09-09 ASSESSMENT — PAIN DESCRIPTION - ORIENTATION: ORIENTATION: RIGHT;LEFT

## 2024-09-09 ASSESSMENT — PAIN DESCRIPTION - LOCATION: LOCATION: WRIST

## 2024-09-09 ASSESSMENT — PAIN SCALES - GENERAL: PAINLEVEL_OUTOF10: 4

## 2024-09-09 ASSESSMENT — PAIN DESCRIPTION - DESCRIPTORS: DESCRIPTORS: ACHING

## 2024-10-01 RX ORDER — RIBOCICLIB 200 MG/1
TABLET, FILM COATED ORAL
Qty: 63 EACH | Refills: 5 | Status: ACTIVE | OUTPATIENT
Start: 2024-10-01

## 2024-10-07 ENCOUNTER — HOSPITAL ENCOUNTER (OUTPATIENT)
Facility: HOSPITAL | Age: 54
Setting detail: INFUSION SERIES
Discharge: HOME | End: 2024-10-07
Payer: COMMERCIAL

## 2024-10-07 ENCOUNTER — OFFICE VISIT (OUTPATIENT)
Age: 54
End: 2024-10-07
Payer: COMMERCIAL

## 2024-10-07 VITALS
TEMPERATURE: 97.5 F | HEART RATE: 63 BPM | SYSTOLIC BLOOD PRESSURE: 136 MMHG | DIASTOLIC BLOOD PRESSURE: 89 MMHG | OXYGEN SATURATION: 97 % | BODY MASS INDEX: 22.46 KG/M2 | WEIGHT: 107 LBS | RESPIRATION RATE: 16 BRPM | HEIGHT: 58 IN

## 2024-10-07 VITALS
HEART RATE: 63 BPM | SYSTOLIC BLOOD PRESSURE: 136 MMHG | TEMPERATURE: 97.5 F | HEIGHT: 58 IN | BODY MASS INDEX: 22.46 KG/M2 | DIASTOLIC BLOOD PRESSURE: 89 MMHG | RESPIRATION RATE: 16 BRPM | OXYGEN SATURATION: 97 % | WEIGHT: 107 LBS

## 2024-10-07 DIAGNOSIS — C50.919 CARCINOMA OF BREAST METASTATIC TO BONE, UNSPECIFIED LATERALITY (HCC): ICD-10-CM

## 2024-10-07 DIAGNOSIS — Z51.81 ENCOUNTER FOR THERAPEUTIC DRUG LEVEL MONITORING: ICD-10-CM

## 2024-10-07 DIAGNOSIS — C50.919 MALIGNANT NEOPLASM OF BREAST, STAGE 1, UNSPECIFIED ESTROGEN RECEPTOR STATUS, UNSPECIFIED LATERALITY (HCC): Primary | ICD-10-CM

## 2024-10-07 DIAGNOSIS — T45.1X5A AROMATASE INHIBITOR-ASSOCIATED ARTHRALGIA: ICD-10-CM

## 2024-10-07 DIAGNOSIS — Z17.0 MALIGNANT NEOPLASM OF CENTRAL PORTION OF LEFT BREAST IN FEMALE, ESTROGEN RECEPTOR POSITIVE (HCC): Primary | ICD-10-CM

## 2024-10-07 DIAGNOSIS — C50.112 MALIGNANT NEOPLASM OF CENTRAL PORTION OF LEFT BREAST IN FEMALE, ESTROGEN RECEPTOR POSITIVE (HCC): Primary | ICD-10-CM

## 2024-10-07 DIAGNOSIS — M25.50 AROMATASE INHIBITOR-ASSOCIATED ARTHRALGIA: ICD-10-CM

## 2024-10-07 DIAGNOSIS — C79.51 CARCINOMA OF BREAST METASTATIC TO BONE, UNSPECIFIED LATERALITY (HCC): ICD-10-CM

## 2024-10-07 LAB
ALBUMIN SERPL-MCNC: 3.5 G/DL (ref 3.5–5)
ALBUMIN/GLOB SERPL: 1 (ref 1.1–2.2)
ALP SERPL-CCNC: 54 U/L (ref 45–117)
ALT SERPL-CCNC: 24 U/L (ref 12–78)
ANION GAP SERPL CALC-SCNC: 3 MMOL/L (ref 2–12)
AST SERPL-CCNC: 17 U/L (ref 15–37)
BASOPHILS # BLD: 0.1 K/UL (ref 0–0.1)
BASOPHILS NFR BLD: 2 % (ref 0–1)
BILIRUB SERPL-MCNC: 0.4 MG/DL (ref 0.2–1)
BUN SERPL-MCNC: 22 MG/DL (ref 6–20)
BUN/CREAT SERPL: 20 (ref 12–20)
CALCIUM SERPL-MCNC: 8.9 MG/DL (ref 8.5–10.1)
CHLORIDE SERPL-SCNC: 110 MMOL/L (ref 97–108)
CO2 SERPL-SCNC: 24 MMOL/L (ref 21–32)
CREAT SERPL-MCNC: 1.08 MG/DL (ref 0.55–1.02)
DIFFERENTIAL METHOD BLD: ABNORMAL
EOSINOPHIL # BLD: 0.1 K/UL (ref 0–0.4)
EOSINOPHIL NFR BLD: 2 % (ref 0–7)
ERYTHROCYTE [DISTWIDTH] IN BLOOD BY AUTOMATED COUNT: 12 % (ref 11.5–14.5)
GLOBULIN SER CALC-MCNC: 3.5 G/DL (ref 2–4)
GLUCOSE SERPL-MCNC: 205 MG/DL (ref 65–100)
HCT VFR BLD AUTO: 36.4 % (ref 35–47)
HGB BLD-MCNC: 12.5 G/DL (ref 11.5–16)
IMM GRANULOCYTES # BLD AUTO: 0 K/UL (ref 0–0.04)
IMM GRANULOCYTES NFR BLD AUTO: 1 % (ref 0–0.5)
LYMPHOCYTES # BLD: 1.6 K/UL (ref 0.8–3.5)
LYMPHOCYTES NFR BLD: 36 % (ref 12–49)
MCH RBC QN AUTO: 33.4 PG (ref 26–34)
MCHC RBC AUTO-ENTMCNC: 34.3 G/DL (ref 30–36.5)
MCV RBC AUTO: 97.3 FL (ref 80–99)
MONOCYTES # BLD: 0.4 K/UL (ref 0–1)
MONOCYTES NFR BLD: 9 % (ref 5–13)
NEUTS SEG # BLD: 2.2 K/UL (ref 1.8–8)
NEUTS SEG NFR BLD: 50 % (ref 32–75)
NRBC # BLD: 0 K/UL (ref 0–0.01)
NRBC BLD-RTO: 0 PER 100 WBC
PLATELET # BLD AUTO: 264 K/UL (ref 150–400)
PMV BLD AUTO: 9.5 FL (ref 8.9–12.9)
POTASSIUM SERPL-SCNC: 3.7 MMOL/L (ref 3.5–5.1)
PROT SERPL-MCNC: 7 G/DL (ref 6.4–8.2)
RBC # BLD AUTO: 3.74 M/UL (ref 3.8–5.2)
SODIUM SERPL-SCNC: 137 MMOL/L (ref 136–145)
WBC # BLD AUTO: 4.3 K/UL (ref 3.6–11)

## 2024-10-07 PROCEDURE — 80053 COMPREHEN METABOLIC PANEL: CPT

## 2024-10-07 PROCEDURE — 85025 COMPLETE CBC W/AUTO DIFF WBC: CPT

## 2024-10-07 PROCEDURE — 99215 OFFICE O/P EST HI 40 MIN: CPT | Performed by: NURSE PRACTITIONER

## 2024-10-07 PROCEDURE — 6360000002 HC RX W HCPCS: Performed by: NURSE PRACTITIONER

## 2024-10-07 PROCEDURE — 36415 COLL VENOUS BLD VENIPUNCTURE: CPT

## 2024-10-07 PROCEDURE — 96402 CHEMO HORMON ANTINEOPL SQ/IM: CPT

## 2024-10-07 RX ORDER — SODIUM CHLORIDE 9 MG/ML
INJECTION, SOLUTION INTRAVENOUS CONTINUOUS
OUTPATIENT
Start: 2024-11-04

## 2024-10-07 RX ORDER — ALBUTEROL SULFATE 90 UG/1
4 INHALANT RESPIRATORY (INHALATION) PRN
OUTPATIENT
Start: 2024-11-04

## 2024-10-07 RX ORDER — ONDANSETRON 2 MG/ML
8 INJECTION INTRAMUSCULAR; INTRAVENOUS
OUTPATIENT
Start: 2024-11-04

## 2024-10-07 RX ORDER — EPINEPHRINE 1 MG/ML
0.3 INJECTION, SOLUTION INTRAMUSCULAR; SUBCUTANEOUS PRN
OUTPATIENT
Start: 2024-11-04

## 2024-10-07 RX ORDER — DIPHENHYDRAMINE HYDROCHLORIDE 50 MG/ML
50 INJECTION INTRAMUSCULAR; INTRAVENOUS
OUTPATIENT
Start: 2024-11-04

## 2024-10-07 RX ORDER — FAMOTIDINE 10 MG/ML
20 INJECTION, SOLUTION INTRAVENOUS
OUTPATIENT
Start: 2024-11-04

## 2024-10-07 RX ORDER — ACETAMINOPHEN 325 MG/1
650 TABLET ORAL
OUTPATIENT
Start: 2024-11-04

## 2024-10-07 RX ADMIN — LEUPROLIDE ACETATE 3.75 MG: KIT at 13:31

## 2024-10-07 ASSESSMENT — PAIN - FUNCTIONAL ASSESSMENT: PAIN_FUNCTIONAL_ASSESSMENT: ACTIVITIES ARE NOT PREVENTED

## 2024-10-07 ASSESSMENT — PAIN SCALES - GENERAL: PAINLEVEL_OUTOF10: 4

## 2024-10-07 ASSESSMENT — PAIN DESCRIPTION - ORIENTATION: ORIENTATION: RIGHT;LEFT

## 2024-10-07 ASSESSMENT — PAIN DESCRIPTION - DESCRIPTORS: DESCRIPTORS: ACHING

## 2024-10-07 ASSESSMENT — PAIN DESCRIPTION - LOCATION: LOCATION: ANKLE;WRIST

## 2024-10-07 ASSESSMENT — PAIN DESCRIPTION - PAIN TYPE: TYPE: CHRONIC PAIN

## 2024-10-07 NOTE — PROGRESS NOTES
Lindsay Schuler is a 54 y.o. female Follow up for Breast cancer    1. Have you been to the ER, urgent care clinic since your last visit?  Hospitalized since your last visit?No    2. Have you seen or consulted any other health care providers outside of the [facility] since your last visit?  Include any pap smears or colon screening. No     10/7/2024  1:15 PM   Vitals    SYSTOLIC 136    DIASTOLIC 89    Site    Position    Pulse 63    Temp 97.5 °F (36.4 °C)    Respirations 16    SpO2 97 %    Weight - Scale 107 lb    Height 4' 10\"    Body Mass Index 22.36 kg/m2    Pain Score    Pain Loc    Pain Level 4        
finding.  .  IMPRESSION:  IMPRESSION:  1. Fat necrosis in the left breast.  BI-RADS Category 2 - Benign findings.   2. No visible mass or other sonographic abnormality in the inferior aspect of  the right breast. There is a small amount of tissue in this area which allowed  for a very thorough exam with technically excellent images.  BI-RADS Category 2 - Benign findings.   .  RECOMMENDATION:  Given the patient's adamant refusal for mammography, recommend yearly screening  MRI performed given her high risk. Additionally, I discussed the possibility of  performing automated breast ultrasound to supplement MRI screening, especially  if screening is only performed every other year with MRI.     11/30/23 MRI breast  IMPRESSION:   1. Left breast mass with washout kinetics (7 mm). BI-RADS 4.  2. Right breast non-masslike enhancement (4 x 8 mm). BI-RADS 4.  3. Overall assessment: BI-RADS Assessment Category 4: Suspicious abnormality.   4. Recommendation: Bilateral second look ultrasound and ultrasound-guided  biopsy. MRI guided biopsy of these masses may not be possible with implants in  place.    11/2/23 bone scan  IMPRESSION:  Increased bone metastases.     11/2/23 CT c/a/p  IMPRESSION:  1. Interval decrease in size of retroareolar left breast mass, now measuring 2.1  cm x 1.4 cm.  2. Interval decrease in size of subcentimeter pulmonary nodules.   3. Sclerosis of the right clavicular head, unchanged.        2/19/24 bone scan  IMPRESSION:  Activity in the right clavicular head is stable consistent with  osseous metastatic disease. A few other scattered small foci in the cervical  spine and ribs/scapula are stable except for a new focus of activity left  seventh rib anteriorly which corresponds to a new sclerotic lesion on CT    2/19/24 CT c/a/p  IMPRESSION:  1.  Stable left retroareolar breast mass measuring 2.1 cm.  2.  Stable subcentimeter pulmonary nodules.  3.  Stable sclerosis of the right clavicular head.  4.  New

## 2024-10-07 NOTE — PROGRESS NOTES
[facility] Progress Note    Date: October 7, 2024    1300: Ms. Schuler Arrived ambulatory and in stable condition to the [facility] for Lupron Injection (RGM).  Assessment was completed, no new complaints voiced. Peripheral labs drawn and sent for processing. Criteria for treatment was met.    Ms. Schuler's vitals were reviewed.  Patient Vitals for the past 12 hrs:   Temp Pulse Resp BP SpO2   10/07/24 1315 97.5 °F (36.4 °C) 63 16 136/89 97 %     Lab results were obtained and reviewed.  Recent Results (from the past 12 hour(s))   CBC with Auto Differential    Collection Time: 10/07/24  1:23 PM   Result Value Ref Range    WBC 4.3 3.6 - 11.0 K/uL    RBC 3.74 (L) 3.80 - 5.20 M/uL    Hemoglobin 12.5 11.5 - 16.0 g/dL    Hematocrit 36.4 35.0 - 47.0 %    MCV 97.3 80.0 - 99.0 FL    MCH 33.4 26.0 - 34.0 PG    MCHC 34.3 30.0 - 36.5 g/dL    RDW 12.0 11.5 - 14.5 %    Platelets 264 150 - 400 K/uL    MPV 9.5 8.9 - 12.9 FL    Nucleated RBCs 0.0 0  WBC    nRBC 0.00 0.00 - 0.01 K/uL    Neutrophils % 50 32 - 75 %    Lymphocytes % 36 12 - 49 %    Monocytes % 9 5 - 13 %    Eosinophils % 2 0 - 7 %    Basophils % 2 (H) 0 - 1 %    Immature Granulocytes % 1 (H) 0.0 - 0.5 %    Neutrophils Absolute 2.2 1.8 - 8.0 K/UL    Lymphocytes Absolute 1.6 0.8 - 3.5 K/UL    Monocytes Absolute 0.4 0.0 - 1.0 K/UL    Eosinophils Absolute 0.1 0.0 - 0.4 K/UL    Basophils Absolute 0.1 0.0 - 0.1 K/UL    Immature Granulocytes Absolute 0.0 0.00 - 0.04 K/UL    Differential Type AUTOMATED        Medications:  Medications Administered         leuprolide (LUPRON) injection 3.75 mg Admin Date  10/07/2024 Action  Given Dose  3.75 mg Route  IntraMUSCular Documented By  Karine Cunnnigham RN          Given in RGM    Patient tolerated treatment well. Patient was discharged in stable condition. Patient is aware of next scheduled [facility] appointment on November 4, 2024 at 1300.    Future Appointments   Date Time Provider Department Center   11/4/2024  1:00 PM SS FASTTRACK 2 [facility]

## 2024-10-08 ENCOUNTER — PATIENT MESSAGE (OUTPATIENT)
Age: 54
End: 2024-10-08

## 2024-10-10 DIAGNOSIS — R73.09 ELEVATED GLUCOSE LEVEL: Primary | ICD-10-CM

## 2024-10-28 RX ORDER — ZOLEDRONIC ACID 0.04 MG/ML
4 INJECTION, SOLUTION INTRAVENOUS ONCE
OUTPATIENT
Start: 2024-11-04 | End: 2024-11-04

## 2024-10-28 RX ORDER — SODIUM CHLORIDE 9 MG/ML
5-250 INJECTION, SOLUTION INTRAVENOUS PRN
OUTPATIENT
Start: 2024-11-04

## 2024-11-04 ENCOUNTER — OFFICE VISIT (OUTPATIENT)
Age: 54
End: 2024-11-04
Payer: COMMERCIAL

## 2024-11-04 ENCOUNTER — HOSPITAL ENCOUNTER (OUTPATIENT)
Facility: HOSPITAL | Age: 54
Setting detail: INFUSION SERIES
Discharge: HOME | End: 2024-11-04
Payer: COMMERCIAL

## 2024-11-04 VITALS
DIASTOLIC BLOOD PRESSURE: 85 MMHG | BODY MASS INDEX: 23.22 KG/M2 | HEART RATE: 67 BPM | HEIGHT: 57 IN | TEMPERATURE: 98 F | WEIGHT: 107.6 LBS | SYSTOLIC BLOOD PRESSURE: 129 MMHG

## 2024-11-04 VITALS
SYSTOLIC BLOOD PRESSURE: 129 MMHG | HEART RATE: 67 BPM | TEMPERATURE: 98 F | RESPIRATION RATE: 18 BRPM | DIASTOLIC BLOOD PRESSURE: 85 MMHG | HEIGHT: 58 IN | OXYGEN SATURATION: 95 % | WEIGHT: 107.4 LBS | BODY MASS INDEX: 22.55 KG/M2

## 2024-11-04 DIAGNOSIS — T45.1X5A AROMATASE INHIBITOR-ASSOCIATED ARTHRALGIA: ICD-10-CM

## 2024-11-04 DIAGNOSIS — C50.919 CARCINOMA OF BREAST METASTATIC TO BONE, UNSPECIFIED LATERALITY (HCC): ICD-10-CM

## 2024-11-04 DIAGNOSIS — C50.112 MALIGNANT NEOPLASM OF CENTRAL PORTION OF LEFT BREAST IN FEMALE, ESTROGEN RECEPTOR POSITIVE (HCC): Primary | ICD-10-CM

## 2024-11-04 DIAGNOSIS — Z17.0 MALIGNANT NEOPLASM OF CENTRAL PORTION OF LEFT BREAST IN FEMALE, ESTROGEN RECEPTOR POSITIVE (HCC): Primary | ICD-10-CM

## 2024-11-04 DIAGNOSIS — M25.50 AROMATASE INHIBITOR-ASSOCIATED ARTHRALGIA: ICD-10-CM

## 2024-11-04 DIAGNOSIS — C50.919 MALIGNANT NEOPLASM OF BREAST, STAGE 1, UNSPECIFIED ESTROGEN RECEPTOR STATUS, UNSPECIFIED LATERALITY (HCC): Primary | ICD-10-CM

## 2024-11-04 DIAGNOSIS — C79.51 CARCINOMA OF BREAST METASTATIC TO BONE, UNSPECIFIED LATERALITY (HCC): ICD-10-CM

## 2024-11-04 DIAGNOSIS — Z51.81 ENCOUNTER FOR THERAPEUTIC DRUG LEVEL MONITORING: ICD-10-CM

## 2024-11-04 DIAGNOSIS — R73.09 ELEVATED GLUCOSE LEVEL: ICD-10-CM

## 2024-11-04 LAB
ALBUMIN SERPL-MCNC: 3.9 G/DL (ref 3.5–5)
ALBUMIN/GLOB SERPL: 1 (ref 1.1–2.2)
ALP SERPL-CCNC: 69 U/L (ref 45–117)
ALT SERPL-CCNC: 25 U/L (ref 12–78)
ANION GAP SERPL CALC-SCNC: 2 MMOL/L (ref 2–12)
AST SERPL-CCNC: 14 U/L (ref 15–37)
BASOPHILS # BLD: 0.1 K/UL (ref 0–0.1)
BASOPHILS NFR BLD: 2 % (ref 0–1)
BILIRUB SERPL-MCNC: 0.3 MG/DL (ref 0.2–1)
BUN SERPL-MCNC: 24 MG/DL (ref 6–20)
BUN/CREAT SERPL: 14 (ref 12–20)
CALCIUM SERPL-MCNC: 9.5 MG/DL (ref 8.5–10.1)
CHLORIDE SERPL-SCNC: 108 MMOL/L (ref 97–108)
CO2 SERPL-SCNC: 28 MMOL/L (ref 21–32)
CREAT SERPL-MCNC: 1.69 MG/DL (ref 0.55–1.02)
DIFFERENTIAL METHOD BLD: ABNORMAL
EOSINOPHIL # BLD: 0.1 K/UL (ref 0–0.4)
EOSINOPHIL NFR BLD: 1 % (ref 0–7)
ERYTHROCYTE [DISTWIDTH] IN BLOOD BY AUTOMATED COUNT: 11.9 % (ref 11.5–14.5)
EST. AVERAGE GLUCOSE BLD GHB EST-MCNC: 97 MG/DL
GLOBULIN SER CALC-MCNC: 3.9 G/DL (ref 2–4)
GLUCOSE SERPL-MCNC: 115 MG/DL (ref 65–100)
HBA1C MFR BLD: 5 % (ref 4–5.6)
HCT VFR BLD AUTO: 40.3 % (ref 35–47)
HGB BLD-MCNC: 14 G/DL (ref 11.5–16)
IMM GRANULOCYTES # BLD AUTO: 0 K/UL (ref 0–0.04)
IMM GRANULOCYTES NFR BLD AUTO: 0 % (ref 0–0.5)
LYMPHOCYTES # BLD: 2.3 K/UL (ref 0.8–3.5)
LYMPHOCYTES NFR BLD: 47 % (ref 12–49)
MCH RBC QN AUTO: 33.8 PG (ref 26–34)
MCHC RBC AUTO-ENTMCNC: 34.7 G/DL (ref 30–36.5)
MCV RBC AUTO: 97.3 FL (ref 80–99)
MONOCYTES # BLD: 0.5 K/UL (ref 0–1)
MONOCYTES NFR BLD: 11 % (ref 5–13)
NEUTS SEG # BLD: 1.9 K/UL (ref 1.8–8)
NEUTS SEG NFR BLD: 39 % (ref 32–75)
NRBC # BLD: 0 K/UL (ref 0–0.01)
NRBC BLD-RTO: 0 PER 100 WBC
PLATELET # BLD AUTO: 313 K/UL (ref 150–400)
PMV BLD AUTO: 9.2 FL (ref 8.9–12.9)
POTASSIUM SERPL-SCNC: 4 MMOL/L (ref 3.5–5.1)
PROT SERPL-MCNC: 7.8 G/DL (ref 6.4–8.2)
RBC # BLD AUTO: 4.14 M/UL (ref 3.8–5.2)
SODIUM SERPL-SCNC: 138 MMOL/L (ref 136–145)
WBC # BLD AUTO: 4.9 K/UL (ref 3.6–11)

## 2024-11-04 PROCEDURE — 96365 THER/PROPH/DIAG IV INF INIT: CPT

## 2024-11-04 PROCEDURE — 96402 CHEMO HORMON ANTINEOPL SQ/IM: CPT

## 2024-11-04 PROCEDURE — 80053 COMPREHEN METABOLIC PANEL: CPT

## 2024-11-04 PROCEDURE — 83036 HEMOGLOBIN GLYCOSYLATED A1C: CPT

## 2024-11-04 PROCEDURE — 81001 URINALYSIS AUTO W/SCOPE: CPT

## 2024-11-04 PROCEDURE — 36415 COLL VENOUS BLD VENIPUNCTURE: CPT

## 2024-11-04 PROCEDURE — 99215 OFFICE O/P EST HI 40 MIN: CPT | Performed by: NURSE PRACTITIONER

## 2024-11-04 PROCEDURE — 85025 COMPLETE CBC W/AUTO DIFF WBC: CPT

## 2024-11-04 PROCEDURE — 6360000002 HC RX W HCPCS: Performed by: NURSE PRACTITIONER

## 2024-11-04 RX ORDER — FAMOTIDINE 10 MG/ML
20 INJECTION, SOLUTION INTRAVENOUS
OUTPATIENT
Start: 2024-12-02

## 2024-11-04 RX ORDER — ACETAMINOPHEN 325 MG/1
650 TABLET ORAL
OUTPATIENT
Start: 2024-12-02

## 2024-11-04 RX ORDER — EPINEPHRINE 1 MG/ML
0.3 INJECTION, SOLUTION INTRAMUSCULAR; SUBCUTANEOUS PRN
OUTPATIENT
Start: 2024-12-02

## 2024-11-04 RX ORDER — DIPHENHYDRAMINE HYDROCHLORIDE 50 MG/ML
50 INJECTION INTRAMUSCULAR; INTRAVENOUS
OUTPATIENT
Start: 2024-12-02

## 2024-11-04 RX ORDER — ONDANSETRON 2 MG/ML
8 INJECTION INTRAMUSCULAR; INTRAVENOUS
OUTPATIENT
Start: 2024-12-02

## 2024-11-04 RX ORDER — ALBUTEROL SULFATE 90 UG/1
4 INHALANT RESPIRATORY (INHALATION) PRN
OUTPATIENT
Start: 2024-12-02

## 2024-11-04 RX ORDER — SODIUM CHLORIDE 9 MG/ML
INJECTION, SOLUTION INTRAVENOUS CONTINUOUS
OUTPATIENT
Start: 2024-12-02

## 2024-11-04 RX ADMIN — LEUPROLIDE ACETATE 3.75 MG: KIT at 15:00

## 2024-11-04 ASSESSMENT — PATIENT HEALTH QUESTIONNAIRE - PHQ9
SUM OF ALL RESPONSES TO PHQ9 QUESTIONS 1 & 2: 0
2. FEELING DOWN, DEPRESSED OR HOPELESS: NOT AT ALL
1. LITTLE INTEREST OR PLEASURE IN DOING THINGS: NOT AT ALL
SUM OF ALL RESPONSES TO PHQ QUESTIONS 1-9: 0

## 2024-11-04 ASSESSMENT — PAIN SCALES - GENERAL: PAINLEVEL_OUTOF10: 0

## 2024-11-04 NOTE — PROGRESS NOTES
Lindsay Schuler is a 54 y.o. female is here today for a breast cancer follow up.    1. Have you been to the ER, urgent care clinic since your last visit?  Hospitalized since your last visit?No    2. Have you seen or consulted any other health care providers outside of the [facility] since your last visit?  Include any pap smears or colon screening. No         11/4/2024  1:10 PM   Vitals    SYSTOLIC 129    DIASTOLIC 85    Site    Position    Pulse 67    Temp 98 °F (36.7 °C)    Respirations 18    SpO2 95 %    Weight - Scale 107 lb 6.4 oz    Height 4' 9.992\"    Body Mass Index 22.45 kg/m2    Pain Score    Pain Loc    Pain Level 0

## 2024-11-04 NOTE — PROGRESS NOTES
Cancer [place] at [facility]  [address], [address] [place] [address]  W: [phone]  F: [phone]      Reason for Visit:   Lindsay Schuler is a 54 y.o. female who is seen in follow up for breast cancer.    Breast Surgeon: Dr. Veras    Treatment History:   9/3/15 left breast lumpectomy:  IDC 0.3 cm, gr 1-2, ER + at 95%, TX + at 30%, HER 2 negative at IHC 1+; DCIS present, solid, papillary, gr 2; pT1a pNx cM0  10/7/15  left breast core bx:  IDC, gr 2, ER + at 100%, TX + at 50%, HER 2 negative at IHC 0; DCIS gr 2-3, ER + at 100%, TX + at 20%  Mammaprint shows high risk luminal B 2015  12/1/15:  SLN bx:  0/6 LN  [facility]:  NBN pathologic mutation; BRCA2 VUS  12/15/15:  left breast mastectomy:  IDC, 0.4 cm, gr 2, HER 2 negative at IHC 0; extensive DCIS 1.4 cm, solid, gr 3, no LVI, 0/6 LN, pT1a pN0 cM0  Declined chemotherapy and endocrine therapy  5/17/23 left breast mass core bx:  IDC, gr 1-2, 1.3 cm, ER + at 95%, TX < 1%, HER 2 negative at IHC 0, ki67 5%; DCIS ER + at 95%, TX negative; + skeletal muscle involvement  Mammaprint shows high risk luminal B (-0.273) (2023)  6/30/23 right clavicle bone lesion:  metastatic breast cancer, ER +, weakly 1-10%, TX negative, HER 2 negative at IHC 0  7/17/24- lupron  Zometa 8/14/23- current  Letrozole 8/15/23- 5/29/24 (joint pain)  Ribociclib 400 mg (pt preference) 8/15/23- current  Anastrozole 6/11/24 - current    History of Present Illness:   In 2015, abnormal discharge led to the original pathology.  Mamogram 12/2014 was negative.  Surgical bx originally.  She states she noticed an abnormality in her L breast in 2016, MRI negative that year, MRI shows abnormality in 2018, US called it fat necrosis.  Mass grew this year, biopsy above.    Interval history: Patient presents today for follow up on ribociclib. Reports gr 1 fatigue, gr 2 hot flashes, gr 1 insomnia, gr 1 loss of concentration.     LMP 7/4/23    Past Medical History:

## 2024-11-04 NOTE — PROGRESS NOTES
[facility] Progress Note    Date: 2024    Name: Lindsay Schuler    MRN: [id number]         : 1970    Ms. Schuler Arrived ambulatory and in no distress for Lupron(LGM) and Zometa Infusion(zometa held d/t elevated creatinine per NP).  Assessment was completed, no acute issues at this time, no new complaints voiced.  24 G PIV established to Right arm, + blood return. Labs drawn and sent for processing. Patient denies pregnancy. Patient denies any recent or planned invasive dental work.     Patient proceeded to MD appointment.     Ms. Schuler's vitals were reviewed.  Vitals:    24 1310   BP: 129/85   Pulse: 67   Resp: 18   Temp: 98 °F (36.7 °C)   SpO2: 95%       Lab results were obtained and reviewed.  Recent Results (from the past 12 hour(s))   Comprehensive Metabolic Panel    Collection Time: 24  1:13 PM   Result Value Ref Range    Sodium 138 136 - 145 mmol/L    Potassium 4.0 3.5 - 5.1 mmol/L    Chloride 108 97 - 108 mmol/L    CO2 28 21 - 32 mmol/L    Anion Gap 2 2 - 12 mmol/L    Glucose 115 (H) 65 - 100 mg/dL    BUN 24 (H) 6 - 20 MG/DL    Creatinine 1.69 (H) 0.55 - 1.02 MG/DL    BUN/Creatinine Ratio 14 12 - 20      Est, Glom Filt Rate 36 (L) >60 ml/min/1.73m2    Calcium 9.5 8.5 - 10.1 MG/DL    Total Bilirubin 0.3 0.2 - 1.0 MG/DL    ALT 25 12 - 78 U/L    AST 14 (L) 15 - 37 U/L    Alk Phosphatase 69 45 - 117 U/L    Total Protein 7.8 6.4 - 8.2 g/dL    Albumin 3.9 3.5 - 5.0 g/dL    Globulin 3.9 2.0 - 4.0 g/dL    Albumin/Globulin Ratio 1.0 (L) 1.1 - 2.2     CBC with Auto Differential    Collection Time: 24  1:13 PM   Result Value Ref Range    WBC 4.9 3.6 - 11.0 K/uL    RBC 4.14 3.80 - 5.20 M/uL    Hemoglobin 14.0 11.5 - 16.0 g/dL    Hematocrit 40.3 35.0 - 47.0 %    MCV 97.3 80.0 - 99.0 FL    MCH 33.8 26.0 - 34.0 PG    MCHC 34.7 30.0 - 36.5 g/dL    RDW 11.9 11.5 - 14.5 %    Platelets 313 150 - 400 K/uL    MPV 9.2 8.9 - 12.9 FL    Nucleated RBCs 0.0 0  WBC    nRBC 0.00 0.00 - 0.01 K/uL     Neutrophils % 39 32 - 75 %    Lymphocytes % 47 12 - 49 %    Monocytes % 11 5 - 13 %    Eosinophils % 1 0 - 7 %    Basophils % 2 (H) 0 - 1 %    Immature Granulocytes % 0 0.0 - 0.5 %    Neutrophils Absolute 1.9 1.8 - 8.0 K/UL    Lymphocytes Absolute 2.3 0.8 - 3.5 K/UL    Monocytes Absolute 0.5 0.0 - 1.0 K/UL    Eosinophils Absolute 0.1 0.0 - 0.4 K/UL    Basophils Absolute 0.1 0.0 - 0.1 K/UL    Immature Granulocytes Absolute 0.0 0.00 - 0.04 K/UL    Differential Type AUTOMATED         Medications:  Medications Administered         leuprolide (LUPRON) injection 3.75 mg Admin Date  11/04/2024 Action  Given Dose  3.75 mg Route  IntraMUSCular Documented By  Ivonne Tejeda, RN             Ms. Schuler tolerated treatment well and [facility].   PIV flushed & removed. Patient is aware of future appointments.    Future Appointments   Date Time Provider Department Center   12/2/2024  1:00 PM SS FASTTRACK 2 [facility]   12/2/2024  1:30 PM Brandon Saldana MD ONCSF BS [facility]   12/30/2024  1:00 PM SS FASTTRACK 1 [facility]   1/23/2025  7:30 AM [facility] NM INJ RM 1 MRNM [facility]   1/23/2025  8:30 AM [facility] CT 1 MRCT [facility]   1/23/2025 10:30 AM [facility] NM RM 2 SFMRNM [facility]   1/27/2025  1:00 PM SS FASTTRACK 2 [facility]   2/10/2025  1:00 PM [facility]       Ivonne Tejeda RN  November 4, 2024

## 2024-11-05 LAB
APPEARANCE UR: CLEAR
BACTERIA URNS QL MICRO: NEGATIVE /HPF
BILIRUB UR QL: NEGATIVE
COLOR UR: NORMAL
EPITH CASTS URNS QL MICRO: NORMAL /LPF
GLUCOSE UR STRIP.AUTO-MCNC: NEGATIVE MG/DL
HGB UR QL STRIP: NEGATIVE
HYALINE CASTS URNS QL MICRO: NORMAL /LPF (ref 0–2)
KETONES UR QL STRIP.AUTO: NEGATIVE MG/DL
LEUKOCYTE ESTERASE UR QL STRIP.AUTO: NEGATIVE
NITRITE UR QL STRIP.AUTO: NEGATIVE
PH UR STRIP: 7 (ref 5–8)
PROT UR STRIP-MCNC: NEGATIVE MG/DL
RBC #/AREA URNS HPF: NORMAL /HPF (ref 0–5)
SP GR UR REFRACTOMETRY: 1.01 (ref 1–1.03)
URINE CULTURE IF INDICATED: NORMAL
UROBILINOGEN UR QL STRIP.AUTO: 0.2 EU/DL (ref 0.2–1)
WBC URNS QL MICRO: NORMAL /HPF (ref 0–4)

## 2024-11-12 ENCOUNTER — CLINICAL DOCUMENTATION (OUTPATIENT)
Age: 54
End: 2024-11-12

## 2024-11-12 NOTE — PROGRESS NOTES
Oral Chemotherapy     Lindsay Schuler is a 54 y.o.female seen for consultation for pharmacist oral chemotherapy management.     Diagnosis: breast cancer    Medication name: ribociclib 200 mg tablets  Regimen: two (2) tablets for a total dose of 400 mg by mouth daily on days 1-21, then 7 days off --> reduce to 200 mg daily days 1-21, 7 off on ~11/12 (patient to confirm date)     Latest Reference Range & Units 10/07/24 13:23 11/04/24 13:13   Creatinine 0.55 - 1.02 MG/DL 1.08 (H) 1.69 (H)   Bun/Cre 12 - 20   20 14   Anion Gap 2 - 12 mmol/L 3 2   Est, Glom Filt Rate >60 ml/min/1.73m2 62 36 (L)   (H): Data is abnormally high  (L): Data is abnormally low    Creatinine increase with GFR impact. Possible contributing medications include ribociclib, Zometa, cortisol injections and vitamin D3 2000 IU daily. Discussed with Trupti and Ms Schuler. Recommend to lower ribociclib from 400 mg to 200 mg and monitor creatinine. Also recommend to reduce vitamin D from 2000 to 400 IU daily (unless vitamin D deficiency). Will continue to monitor.     https://society.[employer].org/research-data/cdk-study     Haylie Hwang, Cynthia, BCPS

## 2024-11-22 RX ORDER — SODIUM CHLORIDE 9 MG/ML
5-250 INJECTION, SOLUTION INTRAVENOUS PRN
Status: CANCELLED | OUTPATIENT
Start: 2024-12-02

## 2024-12-02 ENCOUNTER — HOSPITAL ENCOUNTER (OUTPATIENT)
Facility: HOSPITAL | Age: 54
Setting detail: INFUSION SERIES
Discharge: HOME | End: 2024-12-02
Payer: COMMERCIAL

## 2024-12-02 ENCOUNTER — OFFICE VISIT (OUTPATIENT)
Age: 54
End: 2024-12-02
Payer: COMMERCIAL

## 2024-12-02 VITALS
WEIGHT: 109 LBS | SYSTOLIC BLOOD PRESSURE: 128 MMHG | DIASTOLIC BLOOD PRESSURE: 86 MMHG | HEIGHT: 57 IN | TEMPERATURE: 96.9 F | OXYGEN SATURATION: 97 % | RESPIRATION RATE: 18 BRPM | BODY MASS INDEX: 23.51 KG/M2 | HEART RATE: 67 BPM

## 2024-12-02 VITALS
WEIGHT: 109.2 LBS | BODY MASS INDEX: 23.56 KG/M2 | TEMPERATURE: 96.9 F | HEART RATE: 66 BPM | OXYGEN SATURATION: 96 % | DIASTOLIC BLOOD PRESSURE: 81 MMHG | RESPIRATION RATE: 18 BRPM | HEIGHT: 57 IN | SYSTOLIC BLOOD PRESSURE: 126 MMHG

## 2024-12-02 DIAGNOSIS — C79.51 CARCINOMA OF BREAST METASTATIC TO BONE, UNSPECIFIED LATERALITY (HCC): ICD-10-CM

## 2024-12-02 DIAGNOSIS — Z51.81 ENCOUNTER FOR THERAPEUTIC DRUG LEVEL MONITORING: ICD-10-CM

## 2024-12-02 DIAGNOSIS — C50.112 MALIGNANT NEOPLASM OF CENTRAL PORTION OF LEFT BREAST IN FEMALE, ESTROGEN RECEPTOR POSITIVE (HCC): Primary | ICD-10-CM

## 2024-12-02 DIAGNOSIS — M25.50 AROMATASE INHIBITOR-ASSOCIATED ARTHRALGIA: ICD-10-CM

## 2024-12-02 DIAGNOSIS — Z17.0 MALIGNANT NEOPLASM OF CENTRAL PORTION OF LEFT BREAST IN FEMALE, ESTROGEN RECEPTOR POSITIVE (HCC): Primary | ICD-10-CM

## 2024-12-02 DIAGNOSIS — R73.09 ELEVATED GLUCOSE LEVEL: ICD-10-CM

## 2024-12-02 DIAGNOSIS — C50.919 CARCINOMA OF BREAST METASTATIC TO BONE, UNSPECIFIED LATERALITY (HCC): ICD-10-CM

## 2024-12-02 DIAGNOSIS — T45.1X5A AROMATASE INHIBITOR-ASSOCIATED ARTHRALGIA: ICD-10-CM

## 2024-12-02 DIAGNOSIS — C50.919 MALIGNANT NEOPLASM OF BREAST, STAGE 1, UNSPECIFIED ESTROGEN RECEPTOR STATUS, UNSPECIFIED LATERALITY (HCC): Primary | ICD-10-CM

## 2024-12-02 LAB
ALBUMIN SERPL-MCNC: 4.1 G/DL (ref 3.5–5)
ALBUMIN/GLOB SERPL: 1 (ref 1.1–2.2)
ALP SERPL-CCNC: 73 U/L (ref 45–117)
ALT SERPL-CCNC: 30 U/L (ref 12–78)
ANION GAP SERPL CALC-SCNC: 5 MMOL/L (ref 2–12)
AST SERPL-CCNC: 23 U/L (ref 15–37)
BASOPHILS # BLD: 0 K/UL (ref 0–0.1)
BASOPHILS NFR BLD: 1 % (ref 0–1)
BILIRUB SERPL-MCNC: 0.5 MG/DL (ref 0.2–1)
BUN SERPL-MCNC: 28 MG/DL (ref 6–20)
BUN/CREAT SERPL: 28 (ref 12–20)
CALCIUM SERPL-MCNC: 9.6 MG/DL (ref 8.5–10.1)
CHLORIDE SERPL-SCNC: 105 MMOL/L (ref 97–108)
CO2 SERPL-SCNC: 27 MMOL/L (ref 21–32)
CREAT SERPL-MCNC: 0.99 MG/DL (ref 0.55–1.02)
DIFFERENTIAL METHOD BLD: ABNORMAL
EOSINOPHIL # BLD: 0.1 K/UL (ref 0–0.4)
EOSINOPHIL NFR BLD: 1 % (ref 0–7)
ERYTHROCYTE [DISTWIDTH] IN BLOOD BY AUTOMATED COUNT: 11.7 % (ref 11.5–14.5)
GLOBULIN SER CALC-MCNC: 4 G/DL (ref 2–4)
GLUCOSE SERPL-MCNC: 108 MG/DL (ref 65–100)
HCT VFR BLD AUTO: 42.5 % (ref 35–47)
HGB BLD-MCNC: 14.8 G/DL (ref 11.5–16)
IMM GRANULOCYTES # BLD AUTO: 0 K/UL (ref 0–0.04)
IMM GRANULOCYTES NFR BLD AUTO: 1 % (ref 0–0.5)
LYMPHOCYTES # BLD: 1 K/UL (ref 0.8–3.5)
LYMPHOCYTES NFR BLD: 15 % (ref 12–49)
MCH RBC QN AUTO: 33.4 PG (ref 26–34)
MCHC RBC AUTO-ENTMCNC: 34.8 G/DL (ref 30–36.5)
MCV RBC AUTO: 95.9 FL (ref 80–99)
MONOCYTES # BLD: 0.3 K/UL (ref 0–1)
MONOCYTES NFR BLD: 5 % (ref 5–13)
NEUTS SEG # BLD: 5 K/UL (ref 1.8–8)
NEUTS SEG NFR BLD: 77 % (ref 32–75)
NRBC # BLD: 0 K/UL (ref 0–0.01)
NRBC BLD-RTO: 0 PER 100 WBC
PLATELET # BLD AUTO: 335 K/UL (ref 150–400)
PMV BLD AUTO: 9.1 FL (ref 8.9–12.9)
POTASSIUM SERPL-SCNC: 3.8 MMOL/L (ref 3.5–5.1)
PROT SERPL-MCNC: 8.1 G/DL (ref 6.4–8.2)
RBC # BLD AUTO: 4.43 M/UL (ref 3.8–5.2)
SODIUM SERPL-SCNC: 137 MMOL/L (ref 136–145)
WBC # BLD AUTO: 6.4 K/UL (ref 3.6–11)

## 2024-12-02 PROCEDURE — 96402 CHEMO HORMON ANTINEOPL SQ/IM: CPT

## 2024-12-02 PROCEDURE — 99215 OFFICE O/P EST HI 40 MIN: CPT | Performed by: NURSE PRACTITIONER

## 2024-12-02 PROCEDURE — 36415 COLL VENOUS BLD VENIPUNCTURE: CPT

## 2024-12-02 PROCEDURE — 2580000003 HC RX 258: Performed by: INTERNAL MEDICINE

## 2024-12-02 PROCEDURE — 6360000002 HC RX W HCPCS: Performed by: INTERNAL MEDICINE

## 2024-12-02 PROCEDURE — 96367 TX/PROPH/DG ADDL SEQ IV INF: CPT

## 2024-12-02 PROCEDURE — 6360000002 HC RX W HCPCS: Performed by: NURSE PRACTITIONER

## 2024-12-02 PROCEDURE — 80053 COMPREHEN METABOLIC PANEL: CPT

## 2024-12-02 PROCEDURE — 85025 COMPLETE CBC W/AUTO DIFF WBC: CPT

## 2024-12-02 PROCEDURE — 96413 CHEMO IV INFUSION 1 HR: CPT

## 2024-12-02 RX ORDER — DIPHENHYDRAMINE HYDROCHLORIDE 50 MG/ML
50 INJECTION INTRAMUSCULAR; INTRAVENOUS
OUTPATIENT
Start: 2025-02-23

## 2024-12-02 RX ORDER — SODIUM CHLORIDE 9 MG/ML
INJECTION, SOLUTION INTRAVENOUS CONTINUOUS
OUTPATIENT
Start: 2025-02-23

## 2024-12-02 RX ORDER — HYDROCORTISONE SODIUM SUCCINATE 100 MG/2ML
100 INJECTION INTRAMUSCULAR; INTRAVENOUS
OUTPATIENT
Start: 2024-12-30

## 2024-12-02 RX ORDER — ONDANSETRON 2 MG/ML
8 INJECTION INTRAMUSCULAR; INTRAVENOUS
OUTPATIENT
Start: 2024-12-30

## 2024-12-02 RX ORDER — FAMOTIDINE 10 MG/ML
20 INJECTION, SOLUTION INTRAVENOUS
OUTPATIENT
Start: 2025-02-23

## 2024-12-02 RX ORDER — ALBUTEROL SULFATE 90 UG/1
4 INHALANT RESPIRATORY (INHALATION) PRN
OUTPATIENT
Start: 2024-12-30

## 2024-12-02 RX ORDER — ACETAMINOPHEN 325 MG/1
650 TABLET ORAL
OUTPATIENT
Start: 2025-02-23

## 2024-12-02 RX ORDER — SODIUM CHLORIDE 9 MG/ML
INJECTION, SOLUTION INTRAVENOUS CONTINUOUS
OUTPATIENT
Start: 2024-12-30

## 2024-12-02 RX ORDER — FAMOTIDINE 10 MG/ML
20 INJECTION, SOLUTION INTRAVENOUS
OUTPATIENT
Start: 2024-12-30

## 2024-12-02 RX ORDER — HEPARIN 100 UNIT/ML
500 SYRINGE INTRAVENOUS PRN
OUTPATIENT
Start: 2025-02-23

## 2024-12-02 RX ORDER — SODIUM CHLORIDE 9 MG/ML
5-250 INJECTION, SOLUTION INTRAVENOUS PRN
OUTPATIENT
Start: 2025-02-23

## 2024-12-02 RX ORDER — ONDANSETRON 2 MG/ML
8 INJECTION INTRAMUSCULAR; INTRAVENOUS
OUTPATIENT
Start: 2025-02-23

## 2024-12-02 RX ORDER — ALBUTEROL SULFATE 90 UG/1
4 INHALANT RESPIRATORY (INHALATION) PRN
OUTPATIENT
Start: 2025-02-23

## 2024-12-02 RX ORDER — ACETAMINOPHEN 325 MG/1
650 TABLET ORAL
OUTPATIENT
Start: 2024-12-30

## 2024-12-02 RX ORDER — SODIUM CHLORIDE 0.9 % (FLUSH) 0.9 %
5-40 SYRINGE (ML) INJECTION PRN
OUTPATIENT
Start: 2025-02-23

## 2024-12-02 RX ORDER — EPINEPHRINE 1 MG/ML
0.3 INJECTION, SOLUTION INTRAMUSCULAR; SUBCUTANEOUS PRN
OUTPATIENT
Start: 2025-02-23

## 2024-12-02 RX ORDER — ZOLEDRONIC ACID 0.04 MG/ML
4 INJECTION, SOLUTION INTRAVENOUS ONCE
OUTPATIENT
Start: 2025-02-23 | End: 2025-02-23

## 2024-12-02 RX ORDER — DIPHENHYDRAMINE HYDROCHLORIDE 50 MG/ML
50 INJECTION INTRAMUSCULAR; INTRAVENOUS
OUTPATIENT
Start: 2024-12-30

## 2024-12-02 RX ORDER — EXEMESTANE 25 MG/1
25 TABLET ORAL DAILY
Qty: 90 TABLET | Refills: 3 | Status: SHIPPED | OUTPATIENT
Start: 2024-12-02

## 2024-12-02 RX ORDER — EPINEPHRINE 1 MG/ML
0.3 INJECTION, SOLUTION INTRAMUSCULAR; SUBCUTANEOUS PRN
OUTPATIENT
Start: 2024-12-30

## 2024-12-02 RX ORDER — HYDROCORTISONE SODIUM SUCCINATE 100 MG/2ML
100 INJECTION INTRAMUSCULAR; INTRAVENOUS
OUTPATIENT
Start: 2025-02-23

## 2024-12-02 RX ADMIN — ZOLEDRONIC ACID 3.5 MG: 4 INJECTION, SOLUTION, CONCENTRATE INTRAVENOUS at 14:50

## 2024-12-02 RX ADMIN — LEUPROLIDE ACETATE 3.75 MG: KIT at 15:20

## 2024-12-02 ASSESSMENT — PATIENT HEALTH QUESTIONNAIRE - PHQ9
SUM OF ALL RESPONSES TO PHQ9 QUESTIONS 1 & 2: 0
1. LITTLE INTEREST OR PLEASURE IN DOING THINGS: NOT AT ALL
SUM OF ALL RESPONSES TO PHQ QUESTIONS 1-9: 0
2. FEELING DOWN, DEPRESSED OR HOPELESS: NOT AT ALL
SUM OF ALL RESPONSES TO PHQ QUESTIONS 1-9: 0

## 2024-12-02 ASSESSMENT — PAIN SCALES - GENERAL: PAINLEVEL_OUTOF10: 0

## 2024-12-02 NOTE — PROGRESS NOTES
\[facility]\"" Progress Note    Date: December 2, 2024        1300: Pt arrived ambulatory to \[facility]\"" for Lupron (RGM) + Zometa in stable condition.  Assessment completed. PIV placed to Right AC with positive blood return. Labs drawn and sent for processing.    Labs reviewed. Criteria for treatment was met. Pt denied any chance of pregnancy. Patient stated no extensive dental procedures done and no broken bones.    Patient Vitals for the past 12 hrs:   Temp Pulse Resp BP SpO2   12/02/24 1519 -- 66 18 126/81 96 %   12/02/24 1315 96.9 °F (36.1 °C) 67 -- 128/86 97 %     Recent Results (from the past 12 hour(s))   CBC with Auto Differential    Collection Time: 12/02/24  1:23 PM   Result Value Ref Range    WBC 6.4 3.6 - 11.0 K/uL    RBC 4.43 3.80 - 5.20 M/uL    Hemoglobin 14.8 11.5 - 16.0 g/dL    Hematocrit 42.5 35.0 - 47.0 %    MCV 95.9 80.0 - 99.0 FL    MCH 33.4 26.0 - 34.0 PG    MCHC 34.8 30.0 - 36.5 g/dL    RDW 11.7 11.5 - 14.5 %    Platelets 335 150 - 400 K/uL    MPV 9.1 8.9 - 12.9 FL    Nucleated RBCs 0.0 0  WBC    nRBC 0.00 0.00 - 0.01 K/uL    Neutrophils % 77 (H) 32 - 75 %    Lymphocytes % 15 12 - 49 %    Monocytes % 5 5 - 13 %    Eosinophils % 1 0 - 7 %    Basophils % 1 0 - 1 %    Immature Granulocytes % 1 (H) 0.0 - 0.5 %    Neutrophils Absolute 5.0 1.8 - 8.0 K/UL    Lymphocytes Absolute 1.0 0.8 - 3.5 K/UL    Monocytes Absolute 0.3 0.0 - 1.0 K/UL    Eosinophils Absolute 0.1 0.0 - 0.4 K/UL    Basophils Absolute 0.0 0.0 - 0.1 K/UL    Immature Granulocytes Absolute 0.0 0.00 - 0.04 K/UL    Differential Type AUTOMATED     Comprehensive Metabolic Panel    Collection Time: 12/02/24  1:23 PM   Result Value Ref Range    Sodium 137 136 - 145 mmol/L    Potassium 3.8 3.5 - 5.1 mmol/L    Chloride 105 97 - 108 mmol/L    CO2 27 21 - 32 mmol/L    Anion Gap 5 2 - 12 mmol/L    Glucose 108 (H) 65 - 100 mg/dL    BUN 28 (H) 6 - 20 MG/DL    Creatinine 0.99 0.55 - 1.02 MG/DL    BUN/Creatinine Ratio 28 (H) 12 - 20      Est, Glom Filt Rate 69  >60 ml/min/1.73m2    Calcium 9.6 8.5 - 10.1 MG/DL    Total Bilirubin 0.5 0.2 - 1.0 MG/DL    ALT 30 12 - 78 U/L    AST 23 15 - 37 U/L    Alk Phosphatase 73 45 - 117 U/L    Total Protein 8.1 6.4 - 8.2 g/dL    Albumin 4.1 3.5 - 5.0 g/dL    Globulin 4.0 2.0 - 4.0 g/dL    Albumin/Globulin Ratio 1.0 (L) 1.1 - 2.2           Medications Administered         leuprolide (LUPRON) injection 3.75 mg Admin Date  12/02/2024 Action  Given Dose  3.75 mg Rate   Route  IntraMUSCular Documented By  Nena Peraza, RN        zoledronic acid (ZOMETA) 3.5 mg in sodium chloride 0.9 % 100 mL IVPB Admin Date  12/02/2024 Action  New Bag Dose  3.5 mg Rate  300 mL/hr Route  IntraVENous Documented By  Tad Sorensen, ABAD               Ms. Schuler tolerated the infusion, and had no complaints.    PIV flushed and removed. 2x2 and coban placed    Ms. Schuler [facility] in [facility] condition. Patient is aware of next scheduled [facility] appointment. 12/30 at 1300        Future Appointments   Date Time Provider Department Center   12/30/2024  1:00 PM SS FASTTRACK 1 [facility] INF [facility]   12/30/2024  1:45 PM Brandon Saldana MD ONCSF BS [facility]   1/23/2025  7:30 AM [facility] NM INJ RM 1 SFMRNM [facility]   1/23/2025  8:30 AM [facility] CT 1 SFMRCT [facility]   1/23/2025 10:30 AM [facility] NM RM 2 MRNM [facility]   1/27/2025  1:00 PM SS FASTTRACK 2 [facility] INF [facility]   2/10/2025  1:00 PM SS FASTTRACK 2 [facility] INF [facility]         TAD SORENSEN RN, RN  December 2, 2024

## 2024-12-02 NOTE — PROGRESS NOTES
Lindsay Schuler is a 54 y.o. female is here today for a breast cancer follow up.    1. Have you been to the ER, urgent care clinic since your last visit?  Hospitalized since your last visit?No    2. Have you seen or consulted any other health care providers outside of the [facility] since your last visit?  Include any pap smears or colon screening. No       12/2/2024  1:15 PM   Vitals    SYSTOLIC 128    DIASTOLIC 86    Site    Position    Pulse 67    Temp 96.9 °F (36.1 °C)    Respirations    SpO2 97 %    Weight - Scale 109 lb 3.2 oz    Height 4' 9\"    Body Mass Index 23.63 kg/m2    Pain Score    Pain Loc    Pain Level 0        
ribociclib     Due to restart ribo on 12/4/24.    Recommend sending guardant 360 testing -- no detectable ctDNA    We will plan to see the patient in follow up at least once per cycle, or sooner if symptoms warrant. Labs with each cycle for intensive monitoring for toxicity    The duration of this treatment plan will be until progression, intolerable side effects, or patient choice.    Scans show SD 9/5/24.  Will continue with current plan. Repeat scans scheduled 1/23/24 - delayed until after the holidays per patient preference.     2. Bone Metastases: Discussed zometa 4 mg IV q 3 months if + for bone mets.  We discussed side effects such as ONJ and the need to avoid invasive dental procedures while on these medications, renal damage, and hypocalcemia. Started on 8/14/23, due today, 12/2/24.    3. 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 Neutropenia:  due to ribo, mild. CBC every 4 weeks.     6. Hyperglycemia: 205 on 10/7/24, within normal limits at home. Hgb A1c 5.0 on 11/4/24.     7. Muscle Cramping/Joint pain: continues with tart cherry juice and omega 3 fatty acids. No longer taking cymbalta.     No improvement with almost two week break from letrozole. Started anastrozole with no improvement.   Improved due to cortisone inj with Dr. Johnston. She is frustrated with pain preventing her from enjoying playing instruments. Will switch to exemestane to see if any improvement.     I appreciate the opportunity to participate in Ms. Lindsay Schuler's care.    Signed By: VERONICA Jose - NP      No follow-ups on file.

## 2024-12-07 DIAGNOSIS — F33.9 RECURRENT DEPRESSION (HCC): ICD-10-CM

## 2024-12-07 DIAGNOSIS — Z17.0 MALIGNANT NEOPLASM OF CENTRAL PORTION OF LEFT BREAST IN FEMALE, ESTROGEN RECEPTOR POSITIVE (HCC): ICD-10-CM

## 2024-12-07 DIAGNOSIS — C50.112 MALIGNANT NEOPLASM OF CENTRAL PORTION OF LEFT BREAST IN FEMALE, ESTROGEN RECEPTOR POSITIVE (HCC): ICD-10-CM

## 2024-12-09 RX ORDER — ESCITALOPRAM OXALATE 10 MG/1
10 TABLET ORAL DAILY
Qty: 90 TABLET | Refills: 1 | Status: SHIPPED | OUTPATIENT
Start: 2024-12-09

## 2024-12-10 ENCOUNTER — PATIENT MESSAGE (OUTPATIENT)
Age: 54
End: 2024-12-10

## 2024-12-10 RX ORDER — HYDROXYZINE HYDROCHLORIDE 25 MG/1
25 TABLET, FILM COATED ORAL 2 TIMES DAILY PRN
Qty: 180 TABLET | Refills: 1 | Status: SHIPPED | OUTPATIENT
Start: 2024-12-10

## 2024-12-30 ENCOUNTER — HOSPITAL ENCOUNTER (OUTPATIENT)
Facility: HOSPITAL | Age: 54
Setting detail: INFUSION SERIES
Discharge: HOME | End: 2024-12-30
Payer: COMMERCIAL

## 2024-12-30 ENCOUNTER — OFFICE VISIT (OUTPATIENT)
Age: 54
End: 2024-12-30
Payer: COMMERCIAL

## 2024-12-30 VITALS
SYSTOLIC BLOOD PRESSURE: 126 MMHG | HEIGHT: 57 IN | RESPIRATION RATE: 16 BRPM | DIASTOLIC BLOOD PRESSURE: 77 MMHG | OXYGEN SATURATION: 97 % | HEART RATE: 54 BPM | BODY MASS INDEX: 23.64 KG/M2 | WEIGHT: 109.6 LBS | TEMPERATURE: 98.1 F

## 2024-12-30 VITALS
SYSTOLIC BLOOD PRESSURE: 145 MMHG | DIASTOLIC BLOOD PRESSURE: 84 MMHG | HEART RATE: 63 BPM | OXYGEN SATURATION: 95 % | TEMPERATURE: 98.2 F

## 2024-12-30 DIAGNOSIS — C50.112 MALIGNANT NEOPLASM OF CENTRAL PORTION OF LEFT BREAST IN FEMALE, ESTROGEN RECEPTOR POSITIVE (HCC): Primary | ICD-10-CM

## 2024-12-30 DIAGNOSIS — Z17.0 MALIGNANT NEOPLASM OF CENTRAL PORTION OF LEFT BREAST IN FEMALE, ESTROGEN RECEPTOR POSITIVE (HCC): Primary | ICD-10-CM

## 2024-12-30 DIAGNOSIS — C50.919 MALIGNANT NEOPLASM OF BREAST, STAGE 1, UNSPECIFIED ESTROGEN RECEPTOR STATUS, UNSPECIFIED LATERALITY (HCC): ICD-10-CM

## 2024-12-30 DIAGNOSIS — Z51.81 ENCOUNTER FOR THERAPEUTIC DRUG LEVEL MONITORING: ICD-10-CM

## 2024-12-30 DIAGNOSIS — C79.51 CARCINOMA OF BREAST METASTATIC TO BONE, UNSPECIFIED LATERALITY (HCC): ICD-10-CM

## 2024-12-30 DIAGNOSIS — R73.09 ELEVATED GLUCOSE LEVEL: Primary | ICD-10-CM

## 2024-12-30 DIAGNOSIS — C50.919 CARCINOMA OF BREAST METASTATIC TO BONE, UNSPECIFIED LATERALITY (HCC): ICD-10-CM

## 2024-12-30 LAB
ALBUMIN SERPL-MCNC: 3.7 G/DL (ref 3.5–5)
ALBUMIN/GLOB SERPL: 1.1 (ref 1.1–2.2)
ALP SERPL-CCNC: 61 U/L (ref 45–117)
ALT SERPL-CCNC: 25 U/L (ref 12–78)
ANION GAP SERPL CALC-SCNC: 4 MMOL/L (ref 2–12)
AST SERPL-CCNC: 21 U/L (ref 15–37)
BASOPHILS # BLD: 0.1 K/UL (ref 0–0.1)
BASOPHILS NFR BLD: 1 % (ref 0–1)
BILIRUB SERPL-MCNC: 0.2 MG/DL (ref 0.2–1)
BUN SERPL-MCNC: 19 MG/DL (ref 6–20)
BUN/CREAT SERPL: 23 (ref 12–20)
CALCIUM SERPL-MCNC: 8.8 MG/DL (ref 8.5–10.1)
CHLORIDE SERPL-SCNC: 111 MMOL/L (ref 97–108)
CO2 SERPL-SCNC: 25 MMOL/L (ref 21–32)
CREAT SERPL-MCNC: 0.84 MG/DL (ref 0.55–1.02)
DIFFERENTIAL METHOD BLD: NORMAL
EOSINOPHIL # BLD: 0.1 K/UL (ref 0–0.4)
EOSINOPHIL NFR BLD: 2 % (ref 0–7)
ERYTHROCYTE [DISTWIDTH] IN BLOOD BY AUTOMATED COUNT: 12.8 % (ref 11.5–14.5)
GLOBULIN SER CALC-MCNC: 3.5 G/DL (ref 2–4)
GLUCOSE SERPL-MCNC: 114 MG/DL (ref 65–100)
HCT VFR BLD AUTO: 38 % (ref 35–47)
HGB BLD-MCNC: 13.1 G/DL (ref 11.5–16)
IMM GRANULOCYTES # BLD AUTO: 0 K/UL (ref 0–0.04)
IMM GRANULOCYTES NFR BLD AUTO: 0 % (ref 0–0.5)
LYMPHOCYTES # BLD: 1.7 K/UL (ref 0.8–3.5)
LYMPHOCYTES NFR BLD: 40 % (ref 12–49)
MCH RBC QN AUTO: 33.2 PG (ref 26–34)
MCHC RBC AUTO-ENTMCNC: 34.5 G/DL (ref 30–36.5)
MCV RBC AUTO: 96.4 FL (ref 80–99)
MONOCYTES # BLD: 0.5 K/UL (ref 0–1)
MONOCYTES NFR BLD: 11 % (ref 5–13)
NEUTS SEG # BLD: 1.9 K/UL (ref 1.8–8)
NEUTS SEG NFR BLD: 46 % (ref 32–75)
NRBC # BLD: 0 K/UL (ref 0–0.01)
NRBC BLD-RTO: 0 PER 100 WBC
PLATELET # BLD AUTO: 284 K/UL (ref 150–400)
PMV BLD AUTO: 9.2 FL (ref 8.9–12.9)
POTASSIUM SERPL-SCNC: 3.8 MMOL/L (ref 3.5–5.1)
PROT SERPL-MCNC: 7.2 G/DL (ref 6.4–8.2)
RBC # BLD AUTO: 3.94 M/UL (ref 3.8–5.2)
SODIUM SERPL-SCNC: 140 MMOL/L (ref 136–145)
WBC # BLD AUTO: 4.2 K/UL (ref 3.6–11)

## 2024-12-30 PROCEDURE — 96402 CHEMO HORMON ANTINEOPL SQ/IM: CPT

## 2024-12-30 PROCEDURE — 85025 COMPLETE CBC W/AUTO DIFF WBC: CPT

## 2024-12-30 PROCEDURE — 36415 COLL VENOUS BLD VENIPUNCTURE: CPT

## 2024-12-30 PROCEDURE — 99215 OFFICE O/P EST HI 40 MIN: CPT | Performed by: NURSE PRACTITIONER

## 2024-12-30 PROCEDURE — 80053 COMPREHEN METABOLIC PANEL: CPT

## 2024-12-30 PROCEDURE — 6360000002 HC RX W HCPCS: Performed by: NURSE PRACTITIONER

## 2024-12-30 RX ORDER — ACETAMINOPHEN 325 MG/1
650 TABLET ORAL
OUTPATIENT
Start: 2025-01-27

## 2024-12-30 RX ORDER — FAMOTIDINE 10 MG/ML
20 INJECTION, SOLUTION INTRAVENOUS
OUTPATIENT
Start: 2025-01-27

## 2024-12-30 RX ORDER — ALBUTEROL SULFATE 90 UG/1
4 INHALANT RESPIRATORY (INHALATION) PRN
OUTPATIENT
Start: 2025-01-27

## 2024-12-30 RX ORDER — EPINEPHRINE 1 MG/ML
0.3 INJECTION, SOLUTION INTRAMUSCULAR; SUBCUTANEOUS PRN
OUTPATIENT
Start: 2025-01-27

## 2024-12-30 RX ORDER — SODIUM CHLORIDE 9 MG/ML
INJECTION, SOLUTION INTRAVENOUS CONTINUOUS
OUTPATIENT
Start: 2025-01-27

## 2024-12-30 RX ORDER — ONDANSETRON 2 MG/ML
8 INJECTION INTRAMUSCULAR; INTRAVENOUS
OUTPATIENT
Start: 2025-01-27

## 2024-12-30 RX ORDER — DIPHENHYDRAMINE HYDROCHLORIDE 50 MG/ML
50 INJECTION INTRAMUSCULAR; INTRAVENOUS
OUTPATIENT
Start: 2025-01-27

## 2024-12-30 RX ORDER — RIBOCICLIB 200 MG/1
TABLET, FILM COATED ORAL
Qty: 42 EACH | Refills: 5 | Status: ACTIVE | OUTPATIENT
Start: 2024-12-30

## 2024-12-30 RX ORDER — HYDROCORTISONE SODIUM SUCCINATE 100 MG/2ML
100 INJECTION INTRAMUSCULAR; INTRAVENOUS
OUTPATIENT
Start: 2025-01-27

## 2024-12-30 RX ADMIN — LEUPROLIDE ACETATE 3.75 MG: KIT at 13:17

## 2024-12-30 ASSESSMENT — PATIENT HEALTH QUESTIONNAIRE - PHQ9
SUM OF ALL RESPONSES TO PHQ QUESTIONS 1-9: 0
2. FEELING DOWN, DEPRESSED OR HOPELESS: NOT AT ALL
SUM OF ALL RESPONSES TO PHQ9 QUESTIONS 1 & 2: 0
1. LITTLE INTEREST OR PLEASURE IN DOING THINGS: NOT AT ALL

## 2024-12-30 ASSESSMENT — PAIN SCALES - GENERAL: PAINLEVEL_OUTOF10: 0

## 2024-12-30 NOTE — PROGRESS NOTES
Lindsay Schuler is a 54 y.o. female is here today for a breast cancer follow up.    1. Have you been to the ER, urgent care clinic since your last visit?  Hospitalized since your last visit?No    2. Have you seen or consulted any other health care providers outside of the [facility] since your last visit?  Include any pap smears or colon screening. No

## 2024-12-30 NOTE — PROGRESS NOTES
[facility] [place] [facility]  [address], [address] [place] [address]  W: [phone]  F: [phone]      Reason for Visit:   Lindsay Schuler is a 54 y.o. female who is seen in follow up for breast cancer.    Breast Surgeon: Dr. Veras    Treatment History:   9/3/15 left breast lumpectomy:  IDC 0.3 cm, gr 1-2, ER + at 95%, CA + at 30%, HER 2 negative at IHC 1+; DCIS present, solid, papillary, gr 2; pT1a pNx cM0  10/7/15  left breast core bx:  IDC, gr 2, ER + at 100%, CA + at 50%, HER 2 negative at IHC 0; DCIS gr 2-3, ER + at 100%, CA + at 20%  Mammaprint shows high risk luminal B 2015  12/1/15:  SLN bx:  0/6 LN  [facility]:  NBN pathologic mutation; BRCA2 VUS  12/15/15:  left breast mastectomy:  IDC, 0.4 cm, gr 2, HER 2 negative at IHC 0; extensive DCIS 1.4 cm, solid, gr 3, no LVI, 0/6 LN, pT1a pN0 cM0  Declined chemotherapy and endocrine therapy  5/17/23 left breast mass core bx:  IDC, gr 1-2, 1.3 cm, ER + at 95%, CA < 1%, HER 2 negative at IHC 0, ki67 5%; DCIS ER + at 95%, CA negative; + skeletal muscle involvement  Mammaprint shows high risk luminal B (-0.273) (2023)  6/30/23 right clavicle bone lesion:  metastatic breast cancer, ER +, weakly 1-10%, CA negative, HER 2 negative at IHC 0  7/17/24- lupron  Zometa 8/14/23- current  Letrozole 8/15/23- 5/29/24 (joint pain)  Ribociclib 400 mg (pt preference) 8/15/23- current  Anastrozole 6/11/24 - 12/2/24 (joint pain)  Exemestane 12/2/24 - current    History of Present Illness:   In 2015, abnormal discharge led to the original pathology.  Mamogram 12/2014 was negative.  Surgical bx originally.  She states she noticed an abnormality in her L breast in 2016, MRI negative that year, MRI shows abnormality in 2018, US called it fat necrosis.  Mass grew this year, biopsy above.    Interval history: Patient presents today for follow up on ribociclib. Reports gr 2 hot flashes, gr 1 loss of concentration, gr 1 pain to wrists.

## 2024-12-30 NOTE — PROGRESS NOTES
[facility] Progress Note    Date: 2024    Name: Lindsay Schuler    MRN: [id number]         : 1970    Ms. Schuler Arrived ambulatory and in no distress for Lupron Injection(LGM).  Assessment was completed, no acute issues at this time, no new complaints voiced. Patient denies any chance of pregnancy. PIV labs drawn as ordered and sent for processing.         Ms. Schuler's vitals were reviewed.  Vitals:    24 1304   Temp: 98.2 °F (36.8 °C)         Medications:  Medications Administered         leuprolide (LUPRON) injection 3.75 mg Admin Date  2024 Action  Given Dose  3.75 mg Route  IntraMUSCular Documented By  Ivonne Tejeda, ABAD             Ms. Schuler tolerated treatment well and was [facility] in [facility] condition.   Patient is aware of future appointments.    Future Appointments   Date Time Provider Department Center   2024  1:45 PM Brandon Saldana MD ONCSF BS AMB   2025  7:30 AM [facility] NM INJ RM 1 SFMRNM [facility]   2025  8:30 AM [facility] CT 1 SFMRCT [facility]   2025 10:30 AM [facility] NM RM 2 SFMRNM [facility]   2025  1:00 PM SS FASTTRACK 2 [facility] INF [facility]   2/10/2025  1:00 PM SS FASTTRACK 2 [facility] INF [facility]       Ivonne Tejeda RN  2024

## 2025-01-23 ENCOUNTER — HOSPITAL ENCOUNTER (OUTPATIENT)
Facility: HOSPITAL | Age: 55
Discharge: HOME | End: 2025-01-26
Payer: COMMERCIAL

## 2025-01-23 DIAGNOSIS — Z17.0 MALIGNANT NEOPLASM OF CENTRAL PORTION OF LEFT BREAST IN FEMALE, ESTROGEN RECEPTOR POSITIVE (HCC): ICD-10-CM

## 2025-01-23 DIAGNOSIS — C50.919 CARCINOMA OF BREAST METASTATIC TO BONE, UNSPECIFIED LATERALITY (HCC): ICD-10-CM

## 2025-01-23 DIAGNOSIS — C50.112 MALIGNANT NEOPLASM OF CENTRAL PORTION OF LEFT BREAST IN FEMALE, ESTROGEN RECEPTOR POSITIVE (HCC): ICD-10-CM

## 2025-01-23 DIAGNOSIS — C79.51 CARCINOMA OF BREAST METASTATIC TO BONE, UNSPECIFIED LATERALITY (HCC): ICD-10-CM

## 2025-01-23 PROCEDURE — 6360000004 HC RX CONTRAST MEDICATION: Performed by: NURSE PRACTITIONER

## 2025-01-23 PROCEDURE — 3430000000 HC RX DIAGNOSTIC RADIOPHARMACEUTICAL: Performed by: NURSE PRACTITIONER

## 2025-01-23 PROCEDURE — 74177 CT ABD & PELVIS W/CONTRAST: CPT

## 2025-01-23 PROCEDURE — A9503 TC99M MEDRONATE: HCPCS | Performed by: NURSE PRACTITIONER

## 2025-01-23 PROCEDURE — 78306 BONE IMAGING WHOLE BODY: CPT | Performed by: NURSE PRACTITIONER

## 2025-01-23 RX ORDER — IOPAMIDOL 755 MG/ML
100 INJECTION, SOLUTION INTRAVASCULAR
Status: COMPLETED | OUTPATIENT
Start: 2025-01-23 | End: 2025-01-23

## 2025-01-23 RX ORDER — TC 99M MEDRONATE 20 MG/10ML
24.8 INJECTION, POWDER, LYOPHILIZED, FOR SOLUTION INTRAVENOUS
Status: COMPLETED | OUTPATIENT
Start: 2025-01-23 | End: 2025-01-23

## 2025-01-23 RX ADMIN — IOPAMIDOL 100 ML: 755 INJECTION, SOLUTION INTRAVENOUS at 07:59

## 2025-01-23 RX ADMIN — TC 99M MEDRONATE 24.8 MILLICURIE: 20 INJECTION, POWDER, LYOPHILIZED, FOR SOLUTION INTRAVENOUS at 07:40

## 2025-01-27 ENCOUNTER — OFFICE VISIT (OUTPATIENT)
Age: 55
End: 2025-01-27
Payer: COMMERCIAL

## 2025-01-27 ENCOUNTER — PATIENT MESSAGE (OUTPATIENT)
Age: 55
End: 2025-01-27

## 2025-01-27 ENCOUNTER — HOSPITAL ENCOUNTER (OUTPATIENT)
Facility: HOSPITAL | Age: 55
Setting detail: INFUSION SERIES
Discharge: HOME | End: 2025-01-27
Payer: COMMERCIAL

## 2025-01-27 VITALS
HEART RATE: 63 BPM | DIASTOLIC BLOOD PRESSURE: 82 MMHG | RESPIRATION RATE: 18 BRPM | TEMPERATURE: 97.8 F | OXYGEN SATURATION: 98 % | SYSTOLIC BLOOD PRESSURE: 146 MMHG

## 2025-01-27 VITALS
BODY MASS INDEX: 23.95 KG/M2 | HEIGHT: 57 IN | WEIGHT: 111 LBS | TEMPERATURE: 98.1 F | OXYGEN SATURATION: 98 % | RESPIRATION RATE: 18 BRPM | SYSTOLIC BLOOD PRESSURE: 128 MMHG | DIASTOLIC BLOOD PRESSURE: 73 MMHG | HEART RATE: 62 BPM

## 2025-01-27 DIAGNOSIS — Z17.0 MALIGNANT NEOPLASM OF CENTRAL PORTION OF LEFT BREAST IN FEMALE, ESTROGEN RECEPTOR POSITIVE (HCC): Primary | ICD-10-CM

## 2025-01-27 DIAGNOSIS — C50.919 MALIGNANT NEOPLASM OF BREAST, STAGE 1, UNSPECIFIED ESTROGEN RECEPTOR STATUS, UNSPECIFIED LATERALITY (HCC): Primary | ICD-10-CM

## 2025-01-27 DIAGNOSIS — C50.112 MALIGNANT NEOPLASM OF CENTRAL PORTION OF LEFT BREAST IN FEMALE, ESTROGEN RECEPTOR POSITIVE (HCC): Primary | ICD-10-CM

## 2025-01-27 DIAGNOSIS — R73.09 ELEVATED GLUCOSE LEVEL: ICD-10-CM

## 2025-01-27 LAB
ALBUMIN SERPL-MCNC: 3.7 G/DL (ref 3.5–5)
ALBUMIN/GLOB SERPL: 1 (ref 1.1–2.2)
ALP SERPL-CCNC: 61 U/L (ref 45–117)
ALT SERPL-CCNC: 24 U/L (ref 12–78)
ANION GAP SERPL CALC-SCNC: 3 MMOL/L (ref 2–12)
AST SERPL-CCNC: 23 U/L (ref 15–37)
BASOPHILS # BLD: 0.06 K/UL (ref 0–0.1)
BASOPHILS NFR BLD: 1.5 % (ref 0–1)
BILIRUB SERPL-MCNC: 0.4 MG/DL (ref 0.2–1)
BUN SERPL-MCNC: 16 MG/DL (ref 6–20)
BUN/CREAT SERPL: 16 (ref 12–20)
CALCIUM SERPL-MCNC: 9.2 MG/DL (ref 8.5–10.1)
CHLORIDE SERPL-SCNC: 109 MMOL/L (ref 97–108)
CO2 SERPL-SCNC: 26 MMOL/L (ref 21–32)
CREAT SERPL-MCNC: 1.02 MG/DL (ref 0.55–1.02)
DIFFERENTIAL METHOD BLD: ABNORMAL
EOSINOPHIL # BLD: 0.08 K/UL (ref 0–0.4)
EOSINOPHIL NFR BLD: 2 % (ref 0–7)
ERYTHROCYTE [DISTWIDTH] IN BLOOD BY AUTOMATED COUNT: 12.7 % (ref 11.5–14.5)
GLOBULIN SER CALC-MCNC: 3.7 G/DL (ref 2–4)
GLUCOSE SERPL-MCNC: 149 MG/DL (ref 65–100)
HCT VFR BLD AUTO: 39.5 % (ref 35–47)
HGB BLD-MCNC: 13.6 G/DL (ref 11.5–16)
IMM GRANULOCYTES # BLD AUTO: 0.01 K/UL (ref 0–0.04)
IMM GRANULOCYTES NFR BLD AUTO: 0.2 % (ref 0–0.5)
LYMPHOCYTES # BLD: 1.84 K/UL (ref 0.8–3.5)
LYMPHOCYTES NFR BLD: 45 % (ref 12–49)
MCH RBC QN AUTO: 33.3 PG (ref 26–34)
MCHC RBC AUTO-ENTMCNC: 34.4 G/DL (ref 30–36.5)
MCV RBC AUTO: 96.8 FL (ref 80–99)
MONOCYTES # BLD: 0.44 K/UL (ref 0–1)
MONOCYTES NFR BLD: 10.8 % (ref 5–13)
NEUTS SEG # BLD: 1.66 K/UL (ref 1.8–8)
NEUTS SEG NFR BLD: 40.5 % (ref 32–75)
NRBC # BLD: 0 K/UL (ref 0–0.01)
NRBC BLD-RTO: 0 PER 100 WBC
PLATELET # BLD AUTO: 300 K/UL (ref 150–400)
PMV BLD AUTO: 9.3 FL (ref 8.9–12.9)
POTASSIUM SERPL-SCNC: 3.8 MMOL/L (ref 3.5–5.1)
PROT SERPL-MCNC: 7.4 G/DL (ref 6.4–8.2)
RBC # BLD AUTO: 4.08 M/UL (ref 3.8–5.2)
SODIUM SERPL-SCNC: 138 MMOL/L (ref 136–145)
WBC # BLD AUTO: 4.1 K/UL (ref 3.6–11)

## 2025-01-27 PROCEDURE — 85025 COMPLETE CBC W/AUTO DIFF WBC: CPT

## 2025-01-27 PROCEDURE — 80053 COMPREHEN METABOLIC PANEL: CPT

## 2025-01-27 PROCEDURE — 96402 CHEMO HORMON ANTINEOPL SQ/IM: CPT

## 2025-01-27 PROCEDURE — 6360000002 HC RX W HCPCS: Performed by: NURSE PRACTITIONER

## 2025-01-27 PROCEDURE — 36415 COLL VENOUS BLD VENIPUNCTURE: CPT

## 2025-01-27 PROCEDURE — 99215 OFFICE O/P EST HI 40 MIN: CPT | Performed by: INTERNAL MEDICINE

## 2025-01-27 RX ORDER — ALBUTEROL SULFATE 90 UG/1
4 INHALANT RESPIRATORY (INHALATION) PRN
OUTPATIENT
Start: 2025-02-24

## 2025-01-27 RX ORDER — DIPHENHYDRAMINE HYDROCHLORIDE 50 MG/ML
50 INJECTION INTRAMUSCULAR; INTRAVENOUS
OUTPATIENT
Start: 2025-02-24

## 2025-01-27 RX ORDER — FAMOTIDINE 10 MG/ML
20 INJECTION, SOLUTION INTRAVENOUS
OUTPATIENT
Start: 2025-02-24

## 2025-01-27 RX ORDER — HYDROCORTISONE SODIUM SUCCINATE 100 MG/2ML
100 INJECTION INTRAMUSCULAR; INTRAVENOUS
OUTPATIENT
Start: 2025-02-24

## 2025-01-27 RX ORDER — SODIUM CHLORIDE 9 MG/ML
INJECTION, SOLUTION INTRAVENOUS CONTINUOUS
OUTPATIENT
Start: 2025-02-24

## 2025-01-27 RX ORDER — ACETAMINOPHEN 325 MG/1
650 TABLET ORAL
OUTPATIENT
Start: 2025-02-24

## 2025-01-27 RX ORDER — EPINEPHRINE 1 MG/ML
0.3 INJECTION, SOLUTION INTRAMUSCULAR; SUBCUTANEOUS PRN
OUTPATIENT
Start: 2025-02-24

## 2025-01-27 RX ORDER — ONDANSETRON 2 MG/ML
8 INJECTION INTRAMUSCULAR; INTRAVENOUS
OUTPATIENT
Start: 2025-02-24

## 2025-01-27 RX ADMIN — LEUPROLIDE ACETATE 3.75 MG: KIT at 13:20

## 2025-01-27 ASSESSMENT — PATIENT HEALTH QUESTIONNAIRE - PHQ9
SUM OF ALL RESPONSES TO PHQ QUESTIONS 1-9: 0
2. FEELING DOWN, DEPRESSED OR HOPELESS: NOT AT ALL
1. LITTLE INTEREST OR PLEASURE IN DOING THINGS: NOT AT ALL
SUM OF ALL RESPONSES TO PHQ9 QUESTIONS 1 & 2: 0
SUM OF ALL RESPONSES TO PHQ QUESTIONS 1-9: 0

## 2025-01-27 ASSESSMENT — PAIN DESCRIPTION - ORIENTATION: ORIENTATION: RIGHT;LEFT

## 2025-01-27 ASSESSMENT — PAIN DESCRIPTION - LOCATION: LOCATION: WRIST

## 2025-01-27 ASSESSMENT — PAIN SCALES - GENERAL: PAINLEVEL_OUTOF10: 4

## 2025-01-27 NOTE — PROGRESS NOTES
[facility] [place] [facility]  [address], [address] [place] [address]  W: [phone]  F: [phone]      Reason for Visit:   Lindsay Schuler is a 54 y.o. female who is seen in follow up for breast cancer.    Breast Surgeon: Dr. Veras    Treatment History:   9/3/15 left breast lumpectomy:  IDC 0.3 cm, gr 1-2, ER + at 95%, CO + at 30%, HER 2 negative at IHC 1+; DCIS present, solid, papillary, gr 2; pT1a pNx cM0  10/7/15  left breast core bx:  IDC, gr 2, ER + at 100%, CO + at 50%, HER 2 negative at IHC 0; DCIS gr 2-3, ER + at 100%, CO + at 20%  Mammaprint shows high risk luminal B 2015  12/1/15:  SLN bx:  0/6 LN  [facility]:  NBN pathologic mutation; BRCA2 VUS  12/15/15:  left breast mastectomy:  IDC, 0.4 cm, gr 2, HER 2 negative at IHC 0; extensive DCIS 1.4 cm, solid, gr 3, no LVI, 0/6 LN, pT1a pN0 cM0  Declined chemotherapy and endocrine therapy  5/17/23 left breast mass core bx:  IDC, gr 1-2, 1.3 cm, ER + at 95%, CO < 1%, HER 2 negative at IHC 0, ki67 5%; DCIS ER + at 95%, CO negative; + skeletal muscle involvement  Mammaprint shows high risk luminal B (-0.273) (2023)  6/30/23 right clavicle bone lesion:  metastatic breast cancer, ER +, weakly 1-10%, CO negative, HER 2 negative at IHC 0  7/17/24- lupron  Zometa 8/14/23- current  Letrozole 8/15/23- 5/29/24 (joint pain)  Ribociclib 400 mg (pt preference) 8/15/23- current  Anastrozole 6/11/24 - 12/2024 (joint pain)  Exemestane 12/2/24-    History of Present Illness:   In 2015, abnormal discharge led to the original pathology.  Mamogram 12/2014 was negative.  Surgical bx originally.  She states she noticed an abnormality in her L breast in 2016, MRI negative that year, MRI shows abnormality in 2018, US called it fat necrosis.  Mass grew this year, biopsy above.    Interval history: Patient presents today for follow up on ribociclib. Reports gr 2 hot flashes, gr 1 insomnia, gr 1 loss of concentration, 5/10 wrist

## 2025-01-27 NOTE — PROGRESS NOTES
[facility] Chemotherapy Progress Note  Date: 2025  Name: Lindsay Schuler  MRN: [id number]       : 1970    Pt arrived ambulatory to [facility] for Lupron Injection.   Assessment completed, no new concerns voiced. Labs drawn via venipuncture from [facility] and sent for processing. OK to given lupron prior to MD visit per [place] office.       Ms. Schuler's vitals were reviewed.  Patient Vitals for the past 12 hrs:   Temp Pulse Resp BP SpO2   25 1300 97.8 °F (36.6 °C) 63 18 (!) 146/82 98 %       Lab results were obtained and reviewed.  Recent Results (from the past 12 hour(s))   Comprehensive Metabolic Panel    Collection Time: 25  1:03 PM   Result Value Ref Range    Sodium 138 136 - 145 mmol/L    Potassium 3.8 3.5 - 5.1 mmol/L    Chloride 109 (H) 97 - 108 mmol/L    CO2 26 21 - 32 mmol/L    Anion Gap 3 2 - 12 mmol/L    Glucose 149 (H) 65 - 100 mg/dL    BUN 16 6 - 20 MG/DL    Creatinine 1.02 0.55 - 1.02 MG/DL    BUN/Creatinine Ratio 16 12 - 20      Est, Glom Filt Rate 66 >60 ml/min/1.73m2    Calcium 9.2 8.5 - 10.1 MG/DL    Total Bilirubin 0.4 0.2 - 1.0 MG/DL    ALT 24 12 - 78 U/L    AST 23 15 - 37 U/L    Alk Phosphatase 61 45 - 117 U/L    Total Protein 7.4 6.4 - 8.2 g/dL    Albumin 3.7 3.5 - 5.0 g/dL    Globulin 3.7 2.0 - 4.0 g/dL    Albumin/Globulin Ratio 1.0 (L) 1.1 - 2.2     CBC with Auto Differential    Collection Time: 25  1:03 PM   Result Value Ref Range    WBC 4.1 3.6 - 11.0 K/uL    RBC 4.08 3.80 - 5.20 M/uL    Hemoglobin 13.6 11.5 - 16.0 g/dL    Hematocrit 39.5 35.0 - 47.0 %    MCV 96.8 80.0 - 99.0 FL    MCH 33.3 26.0 - 34.0 PG    MCHC 34.4 30.0 - 36.5 g/dL    RDW 12.7 11.5 - 14.5 %    Platelets 300 150 - 400 K/uL    MPV 9.3 8.9 - 12.9 FL    Nucleated RBCs 0.0 0  WBC    nRBC 0.00 0.00 - 0.01 K/uL    Neutrophils % 40.5 32.0 - 75.0 %    Lymphocytes % 45.0 12.0 - 49.0 %    Monocytes % 10.8 5.0 - 13.0 %    Eosinophils % 2.0 0.0 - 7.0 %    Basophils % 1.5 (H) 0.0 - 1.0 %    Immature

## 2025-01-27 NOTE — PROGRESS NOTES
Lindsay Schuler is a 54 y.o. female is here today for a breast cancer follow up.    1. Have you been to the ER, urgent care clinic since your last visit?  Hospitalized since your last visit?No    2. Have you seen or consulted any other health care providers outside of the [facility] since your last visit?  Include any pap smears or colon screening. No     1/27/2025  1:00 PM   Vitals    SYSTOLIC 146 !    DIASTOLIC 82 !    Site    Position    Cuff Size    Pulse 63    Temp 97.8 °F (36.6 °C)    Respirations 18    SpO2 98 %    Weight - Scale    Height    Body Mass Index    Pain Score    Pain Loc    Pain Level 4       Legend:  ! Abnormal

## 2025-01-28 ENCOUNTER — TELEPHONE (OUTPATIENT)
Age: 55
End: 2025-01-28

## 2025-01-28 NOTE — TELEPHONE ENCOUNTER
Lvm to let patient know we have moved her ov apt to march. Told her to call back [facility]

## 2025-01-28 NOTE — TELEPHONE ENCOUNTER
Called to patient. Agreed that she is doing well on therapy and very stable. OK with switching to seeing her every other month and with scans. Message sent to  to adjust her appointments.       Signed By: VERONICA Jose NP

## 2025-02-10 ENCOUNTER — APPOINTMENT (OUTPATIENT)
Facility: HOSPITAL | Age: 55
End: 2025-02-10
Payer: COMMERCIAL

## 2025-02-24 ENCOUNTER — HOSPITAL ENCOUNTER (OUTPATIENT)
Facility: HOSPITAL | Age: 55
Setting detail: INFUSION SERIES
Discharge: HOME | End: 2025-02-24
Payer: COMMERCIAL

## 2025-02-24 VITALS
RESPIRATION RATE: 18 BRPM | SYSTOLIC BLOOD PRESSURE: 121 MMHG | BODY MASS INDEX: 23.57 KG/M2 | HEART RATE: 54 BPM | HEIGHT: 58 IN | DIASTOLIC BLOOD PRESSURE: 73 MMHG | TEMPERATURE: 97.7 F | OXYGEN SATURATION: 100 % | WEIGHT: 112.3 LBS

## 2025-02-24 DIAGNOSIS — C50.919 MALIGNANT NEOPLASM OF BREAST, STAGE 1, UNSPECIFIED ESTROGEN RECEPTOR STATUS, UNSPECIFIED LATERALITY (HCC): Primary | ICD-10-CM

## 2025-02-24 DIAGNOSIS — R73.09 ELEVATED GLUCOSE LEVEL: ICD-10-CM

## 2025-02-24 LAB
ALBUMIN SERPL-MCNC: 3.7 G/DL (ref 3.5–5)
ALBUMIN/GLOB SERPL: 1.1 (ref 1.1–2.2)
ALP SERPL-CCNC: 60 U/L (ref 45–117)
ALT SERPL-CCNC: 25 U/L (ref 12–78)
ANION GAP SERPL CALC-SCNC: 6 MMOL/L (ref 2–12)
AST SERPL-CCNC: 21 U/L (ref 15–37)
BASOPHILS # BLD: 0.05 K/UL (ref 0–0.1)
BASOPHILS NFR BLD: 1.3 % (ref 0–1)
BILIRUB SERPL-MCNC: 0.5 MG/DL (ref 0.2–1)
BUN SERPL-MCNC: 27 MG/DL (ref 6–20)
BUN/CREAT SERPL: 31 (ref 12–20)
CALCIUM SERPL-MCNC: 9.1 MG/DL (ref 8.5–10.1)
CHLORIDE SERPL-SCNC: 108 MMOL/L (ref 97–108)
CO2 SERPL-SCNC: 26 MMOL/L (ref 21–32)
CREAT SERPL-MCNC: 0.87 MG/DL (ref 0.55–1.02)
DIFFERENTIAL METHOD BLD: ABNORMAL
EOSINOPHIL # BLD: 0.07 K/UL (ref 0–0.4)
EOSINOPHIL NFR BLD: 1.9 % (ref 0–7)
ERYTHROCYTE [DISTWIDTH] IN BLOOD BY AUTOMATED COUNT: 13.1 % (ref 11.5–14.5)
GLOBULIN SER CALC-MCNC: 3.4 G/DL (ref 2–4)
GLUCOSE SERPL-MCNC: 98 MG/DL (ref 65–100)
HCT VFR BLD AUTO: 39.6 % (ref 35–47)
HGB BLD-MCNC: 13.7 G/DL (ref 11.5–16)
IMM GRANULOCYTES # BLD AUTO: 0.01 K/UL (ref 0–0.04)
IMM GRANULOCYTES NFR BLD AUTO: 0.3 % (ref 0–0.5)
LYMPHOCYTES # BLD: 1.88 K/UL (ref 0.8–3.5)
LYMPHOCYTES NFR BLD: 50.3 % (ref 12–49)
MCH RBC QN AUTO: 34.3 PG (ref 26–34)
MCHC RBC AUTO-ENTMCNC: 34.6 G/DL (ref 30–36.5)
MCV RBC AUTO: 99 FL (ref 80–99)
MONOCYTES # BLD: 0.47 K/UL (ref 0–1)
MONOCYTES NFR BLD: 12.6 % (ref 5–13)
NEUTS SEG # BLD: 1.26 K/UL (ref 1.8–8)
NEUTS SEG NFR BLD: 33.6 % (ref 32–75)
NRBC # BLD: 0 K/UL (ref 0–0.01)
NRBC BLD-RTO: 0 PER 100 WBC
PLATELET # BLD AUTO: 262 K/UL (ref 150–400)
PMV BLD AUTO: 9.3 FL (ref 8.9–12.9)
POTASSIUM SERPL-SCNC: 4.4 MMOL/L (ref 3.5–5.1)
PROT SERPL-MCNC: 7.1 G/DL (ref 6.4–8.2)
RBC # BLD AUTO: 4 M/UL (ref 3.8–5.2)
SODIUM SERPL-SCNC: 140 MMOL/L (ref 136–145)
WBC # BLD AUTO: 3.7 K/UL (ref 3.6–11)

## 2025-02-24 PROCEDURE — 85025 COMPLETE CBC W/AUTO DIFF WBC: CPT

## 2025-02-24 PROCEDURE — 80053 COMPREHEN METABOLIC PANEL: CPT

## 2025-02-24 PROCEDURE — 96402 CHEMO HORMON ANTINEOPL SQ/IM: CPT

## 2025-02-24 PROCEDURE — 6360000002 HC RX W HCPCS: Performed by: NURSE PRACTITIONER

## 2025-02-24 PROCEDURE — 2580000003 HC RX 258: Performed by: INTERNAL MEDICINE

## 2025-02-24 PROCEDURE — 6360000002 HC RX W HCPCS: Performed by: INTERNAL MEDICINE

## 2025-02-24 PROCEDURE — 36415 COLL VENOUS BLD VENIPUNCTURE: CPT

## 2025-02-24 PROCEDURE — 96365 THER/PROPH/DIAG IV INF INIT: CPT

## 2025-02-24 RX ORDER — SODIUM CHLORIDE 9 MG/ML
INJECTION, SOLUTION INTRAVENOUS CONTINUOUS
OUTPATIENT
Start: 2025-05-18

## 2025-02-24 RX ORDER — ACETAMINOPHEN 325 MG/1
650 TABLET ORAL
OUTPATIENT
Start: 2025-05-18

## 2025-02-24 RX ORDER — DIPHENHYDRAMINE HYDROCHLORIDE 50 MG/ML
50 INJECTION INTRAMUSCULAR; INTRAVENOUS
OUTPATIENT
Start: 2025-03-24

## 2025-02-24 RX ORDER — ONDANSETRON 2 MG/ML
8 INJECTION INTRAMUSCULAR; INTRAVENOUS
OUTPATIENT
Start: 2025-05-18

## 2025-02-24 RX ORDER — ACETAMINOPHEN 325 MG/1
650 TABLET ORAL
OUTPATIENT
Start: 2025-03-24

## 2025-02-24 RX ORDER — FAMOTIDINE 10 MG/ML
20 INJECTION, SOLUTION INTRAVENOUS
OUTPATIENT
Start: 2025-05-18

## 2025-02-24 RX ORDER — EPINEPHRINE 1 MG/ML
0.3 INJECTION, SOLUTION INTRAMUSCULAR; SUBCUTANEOUS PRN
OUTPATIENT
Start: 2025-05-18

## 2025-02-24 RX ORDER — ALBUTEROL SULFATE 90 UG/1
4 INHALANT RESPIRATORY (INHALATION) PRN
OUTPATIENT
Start: 2025-03-24

## 2025-02-24 RX ORDER — ONDANSETRON 2 MG/ML
8 INJECTION INTRAMUSCULAR; INTRAVENOUS
OUTPATIENT
Start: 2025-03-24

## 2025-02-24 RX ORDER — SODIUM CHLORIDE 9 MG/ML
5-250 INJECTION, SOLUTION INTRAVENOUS PRN
OUTPATIENT
Start: 2025-05-18

## 2025-02-24 RX ORDER — DIPHENHYDRAMINE HYDROCHLORIDE 50 MG/ML
50 INJECTION INTRAMUSCULAR; INTRAVENOUS
OUTPATIENT
Start: 2025-05-18

## 2025-02-24 RX ORDER — FAMOTIDINE 10 MG/ML
20 INJECTION, SOLUTION INTRAVENOUS
OUTPATIENT
Start: 2025-03-24

## 2025-02-24 RX ORDER — HYDROCORTISONE SODIUM SUCCINATE 100 MG/2ML
100 INJECTION INTRAMUSCULAR; INTRAVENOUS
OUTPATIENT
Start: 2025-03-24

## 2025-02-24 RX ORDER — HYDROCORTISONE SODIUM SUCCINATE 100 MG/2ML
100 INJECTION INTRAMUSCULAR; INTRAVENOUS
OUTPATIENT
Start: 2025-05-18

## 2025-02-24 RX ORDER — HEPARIN 100 UNIT/ML
500 SYRINGE INTRAVENOUS PRN
OUTPATIENT
Start: 2025-05-18

## 2025-02-24 RX ORDER — EPINEPHRINE 1 MG/ML
0.3 INJECTION, SOLUTION INTRAMUSCULAR; SUBCUTANEOUS PRN
OUTPATIENT
Start: 2025-03-24

## 2025-02-24 RX ORDER — SODIUM CHLORIDE 9 MG/ML
5-250 INJECTION, SOLUTION INTRAVENOUS PRN
Status: DISCONTINUED | OUTPATIENT
Start: 2025-02-24 | End: 2025-02-25 | Stop reason: HOSPADM

## 2025-02-24 RX ORDER — SODIUM CHLORIDE 9 MG/ML
INJECTION, SOLUTION INTRAVENOUS CONTINUOUS
OUTPATIENT
Start: 2025-03-24

## 2025-02-24 RX ORDER — ZOLEDRONIC ACID 0.04 MG/ML
4 INJECTION, SOLUTION INTRAVENOUS ONCE
Status: COMPLETED | OUTPATIENT
Start: 2025-02-24 | End: 2025-02-24

## 2025-02-24 RX ORDER — ZOLEDRONIC ACID 0.04 MG/ML
4 INJECTION, SOLUTION INTRAVENOUS ONCE
OUTPATIENT
Start: 2025-05-18 | End: 2025-05-18

## 2025-02-24 RX ORDER — SODIUM CHLORIDE 0.9 % (FLUSH) 0.9 %
5-40 SYRINGE (ML) INJECTION PRN
OUTPATIENT
Start: 2025-05-18

## 2025-02-24 RX ORDER — ALBUTEROL SULFATE 90 UG/1
4 INHALANT RESPIRATORY (INHALATION) PRN
OUTPATIENT
Start: 2025-05-18

## 2025-02-24 RX ADMIN — SODIUM CHLORIDE 25 ML/HR: 9 INJECTION, SOLUTION INTRAVENOUS at 08:49

## 2025-02-24 RX ADMIN — ZOLEDRONIC ACID 4 MG: 0.04 INJECTION, SOLUTION INTRAVENOUS at 08:51

## 2025-02-24 RX ADMIN — LEUPROLIDE ACETATE 3.75 MG: KIT at 08:04

## 2025-02-24 ASSESSMENT — PAIN SCALES - GENERAL: PAINLEVEL_OUTOF10: 0

## 2025-02-24 NOTE — PROGRESS NOTES
[facility] Progress Note        Date: 25    Name: Lindsay Schuler    MRN: [id number]         : 1970    MD: Brandon Saldana MD       Ms. Schuler admitted to [facility] for Lupron/ Zometa ambulatory in stable condition. Assessment completed. Patient reported numbness to her right wrist (carpal tunnel), surgery scheduled in April. Otherwise, no new issues reported at this time.  24 gauge peripheral IV obtained in the right AC without difficulty, line flushed and capped Labs drawn and sent for processing.      ** Patient has received this medication in the past. Patient reports no previous reactions to the medication(s). Patient denies any recent or upcoming dental procedures.         Vitals:    25 0730 25 0915   BP: (!) 125/50 121/73   Pulse: 54    Resp: 18    Temp: 97.7 °F (36.5 °C)    TempSrc: Temporal    SpO2: 100%    Weight: 50.9 kg (112 lb 4.8 oz)    Height: 1.473 m (4' 10\")            Results for orders placed or performed during the hospital encounter of 25   CBC with Auto Differential   Result Value Ref Range    WBC 3.7 3.6 - 11.0 K/uL    RBC 4.00 3.80 - 5.20 M/uL    Hemoglobin 13.7 11.5 - 16.0 g/dL    Hematocrit 39.6 35.0 - 47.0 %    MCV 99.0 80.0 - 99.0 FL    MCH 34.3 (H) 26.0 - 34.0 PG    MCHC 34.6 30.0 - 36.5 g/dL    RDW 13.1 11.5 - 14.5 %    Platelets 262 150 - 400 K/uL    MPV 9.3 8.9 - 12.9 FL    Nucleated RBCs 0.0 0  WBC    nRBC 0.00 0.00 - 0.01 K/uL    Neutrophils % 33.6 32.0 - 75.0 %    Lymphocytes % 50.3 (H) 12.0 - 49.0 %    Monocytes % 12.6 5.0 - 13.0 %    Eosinophils % 1.9 0.0 - 7.0 %    Basophils % 1.3 (H) 0.0 - 1.0 %    Immature Granulocytes % 0.3 0.0 - 0.5 %    Neutrophils Absolute 1.26 (L) 1.80 - 8.00 K/UL    Lymphocytes Absolute 1.88 0.80 - 3.50 K/UL    Monocytes Absolute 0.47 0.00 - 1.00 K/UL    Eosinophils Absolute 0.07 0.00 - 0.40 K/UL    Basophils Absolute 0.05 0.00 - 0.10 K/UL    Immature Granulocytes Absolute 0.01 0.00 - 0.04 K/UL

## 2025-03-10 ENCOUNTER — APPOINTMENT (OUTPATIENT)
Facility: HOSPITAL | Age: 55
End: 2025-03-10
Payer: COMMERCIAL

## 2025-03-24 ENCOUNTER — APPOINTMENT (OUTPATIENT)
Facility: HOSPITAL | Age: 55
End: 2025-03-24
Payer: COMMERCIAL

## 2025-03-25 ENCOUNTER — HOSPITAL ENCOUNTER (OUTPATIENT)
Facility: HOSPITAL | Age: 55
Setting detail: INFUSION SERIES
Discharge: HOME | End: 2025-03-25
Payer: COMMERCIAL

## 2025-03-25 ENCOUNTER — OFFICE VISIT (OUTPATIENT)
Age: 55
End: 2025-03-25
Payer: COMMERCIAL

## 2025-03-25 ENCOUNTER — APPOINTMENT (OUTPATIENT)
Facility: HOSPITAL | Age: 55
End: 2025-03-25
Payer: COMMERCIAL

## 2025-03-25 VITALS
HEART RATE: 56 BPM | OXYGEN SATURATION: 99 % | DIASTOLIC BLOOD PRESSURE: 63 MMHG | SYSTOLIC BLOOD PRESSURE: 111 MMHG | BODY MASS INDEX: 23.51 KG/M2 | HEIGHT: 58 IN | WEIGHT: 112 LBS | TEMPERATURE: 98 F | RESPIRATION RATE: 16 BRPM

## 2025-03-25 VITALS
HEIGHT: 58 IN | RESPIRATION RATE: 16 BRPM | OXYGEN SATURATION: 99 % | BODY MASS INDEX: 23.66 KG/M2 | SYSTOLIC BLOOD PRESSURE: 111 MMHG | WEIGHT: 112.7 LBS | TEMPERATURE: 98 F | HEART RATE: 56 BPM | DIASTOLIC BLOOD PRESSURE: 63 MMHG

## 2025-03-25 DIAGNOSIS — Z51.81 ENCOUNTER FOR THERAPEUTIC DRUG LEVEL MONITORING: ICD-10-CM

## 2025-03-25 DIAGNOSIS — C79.51 CARCINOMA OF BREAST METASTATIC TO BONE, UNSPECIFIED LATERALITY: ICD-10-CM

## 2025-03-25 DIAGNOSIS — Z17.0 MALIGNANT NEOPLASM OF CENTRAL PORTION OF LEFT BREAST IN FEMALE, ESTROGEN RECEPTOR POSITIVE: Primary | ICD-10-CM

## 2025-03-25 DIAGNOSIS — C50.919 MALIGNANT NEOPLASM OF BREAST, STAGE 1, UNSPECIFIED ESTROGEN RECEPTOR STATUS, UNSPECIFIED LATERALITY: ICD-10-CM

## 2025-03-25 DIAGNOSIS — C50.112 MALIGNANT NEOPLASM OF CENTRAL PORTION OF LEFT BREAST IN FEMALE, ESTROGEN RECEPTOR POSITIVE: Primary | ICD-10-CM

## 2025-03-25 DIAGNOSIS — C50.919 CARCINOMA OF BREAST METASTATIC TO BONE, UNSPECIFIED LATERALITY: ICD-10-CM

## 2025-03-25 DIAGNOSIS — R73.09 ELEVATED GLUCOSE LEVEL: Primary | ICD-10-CM

## 2025-03-25 LAB
ALBUMIN SERPL-MCNC: 3.3 G/DL (ref 3.5–5)
ALBUMIN/GLOB SERPL: 0.9 (ref 1.1–2.2)
ALP SERPL-CCNC: 56 U/L (ref 45–117)
ALT SERPL-CCNC: 37 U/L (ref 12–78)
ANION GAP SERPL CALC-SCNC: 5 MMOL/L (ref 2–12)
AST SERPL-CCNC: 30 U/L (ref 15–37)
BASOPHILS # BLD: 0.04 K/UL (ref 0–0.1)
BASOPHILS NFR BLD: 1.6 % (ref 0–1)
BILIRUB SERPL-MCNC: 0.6 MG/DL (ref 0.2–1)
BUN SERPL-MCNC: 26 MG/DL (ref 6–20)
BUN/CREAT SERPL: 27 (ref 12–20)
CALCIUM SERPL-MCNC: 8.5 MG/DL (ref 8.5–10.1)
CHLORIDE SERPL-SCNC: 106 MMOL/L (ref 97–108)
CO2 SERPL-SCNC: 28 MMOL/L (ref 21–32)
CREAT SERPL-MCNC: 0.98 MG/DL (ref 0.55–1.02)
DIFFERENTIAL METHOD BLD: ABNORMAL
EOSINOPHIL # BLD: 0.06 K/UL (ref 0–0.4)
EOSINOPHIL NFR BLD: 2.4 % (ref 0–7)
ERYTHROCYTE [DISTWIDTH] IN BLOOD BY AUTOMATED COUNT: 12.4 % (ref 11.5–14.5)
GLOBULIN SER CALC-MCNC: 3.6 G/DL (ref 2–4)
GLUCOSE SERPL-MCNC: 98 MG/DL (ref 65–100)
HCT VFR BLD AUTO: 40 % (ref 35–47)
HGB BLD-MCNC: 13.4 G/DL (ref 11.5–16)
IMM GRANULOCYTES # BLD AUTO: 0 K/UL (ref 0–0.04)
IMM GRANULOCYTES NFR BLD AUTO: 0 % (ref 0–0.5)
LYMPHOCYTES # BLD: 1.18 K/UL (ref 0.8–3.5)
LYMPHOCYTES NFR BLD: 47.4 % (ref 12–49)
MCH RBC QN AUTO: 32.9 PG (ref 26–34)
MCHC RBC AUTO-ENTMCNC: 33.5 G/DL (ref 30–36.5)
MCV RBC AUTO: 98.3 FL (ref 80–99)
MONOCYTES # BLD: 0.38 K/UL (ref 0–1)
MONOCYTES NFR BLD: 15 % (ref 5–13)
NEUTS SEG # BLD: 0.84 K/UL (ref 1.8–8)
NEUTS SEG NFR BLD: 33.6 % (ref 32–75)
NRBC # BLD: 0 K/UL (ref 0–0.01)
NRBC BLD-RTO: 0 PER 100 WBC
PLATELET # BLD AUTO: 241 K/UL (ref 150–400)
PMV BLD AUTO: 9 FL (ref 8.9–12.9)
POTASSIUM SERPL-SCNC: 4.2 MMOL/L (ref 3.5–5.1)
PROT SERPL-MCNC: 6.9 G/DL (ref 6.4–8.2)
RBC # BLD AUTO: 4.07 M/UL (ref 3.8–5.2)
RBC MORPH BLD: ABNORMAL
SODIUM SERPL-SCNC: 139 MMOL/L (ref 136–145)
WBC # BLD AUTO: 2.5 K/UL (ref 3.6–11)
WBC MORPH BLD: ABNORMAL

## 2025-03-25 PROCEDURE — 99215 OFFICE O/P EST HI 40 MIN: CPT | Performed by: NURSE PRACTITIONER

## 2025-03-25 PROCEDURE — 36415 COLL VENOUS BLD VENIPUNCTURE: CPT

## 2025-03-25 PROCEDURE — 96402 CHEMO HORMON ANTINEOPL SQ/IM: CPT

## 2025-03-25 PROCEDURE — 6360000002 HC RX W HCPCS: Performed by: INTERNAL MEDICINE

## 2025-03-25 PROCEDURE — 80053 COMPREHEN METABOLIC PANEL: CPT

## 2025-03-25 PROCEDURE — 85025 COMPLETE CBC W/AUTO DIFF WBC: CPT

## 2025-03-25 RX ORDER — ALBUTEROL SULFATE 90 UG/1
4 INHALANT RESPIRATORY (INHALATION) PRN
OUTPATIENT
Start: 2025-04-20

## 2025-03-25 RX ORDER — SODIUM CHLORIDE 9 MG/ML
INJECTION, SOLUTION INTRAVENOUS CONTINUOUS
OUTPATIENT
Start: 2025-04-20

## 2025-03-25 RX ORDER — ANASTROZOLE 1 MG/1
1 TABLET ORAL DAILY
Qty: 90 TABLET | Refills: 3 | Status: SHIPPED | OUTPATIENT
Start: 2025-03-25

## 2025-03-25 RX ORDER — EPINEPHRINE 1 MG/ML
0.3 INJECTION, SOLUTION INTRAMUSCULAR; SUBCUTANEOUS PRN
OUTPATIENT
Start: 2025-04-20

## 2025-03-25 RX ORDER — ANASTROZOLE 1 MG/1
1 TABLET ORAL DAILY
COMMUNITY
End: 2025-03-25 | Stop reason: SDUPTHER

## 2025-03-25 RX ORDER — DIPHENHYDRAMINE HYDROCHLORIDE 50 MG/ML
50 INJECTION, SOLUTION INTRAMUSCULAR; INTRAVENOUS
OUTPATIENT
Start: 2025-04-20

## 2025-03-25 RX ORDER — ONDANSETRON 2 MG/ML
8 INJECTION INTRAMUSCULAR; INTRAVENOUS
OUTPATIENT
Start: 2025-04-20

## 2025-03-25 RX ORDER — FAMOTIDINE 10 MG/ML
20 INJECTION, SOLUTION INTRAVENOUS
OUTPATIENT
Start: 2025-04-20

## 2025-03-25 RX ORDER — ACETAMINOPHEN 325 MG/1
650 TABLET ORAL
OUTPATIENT
Start: 2025-04-20

## 2025-03-25 RX ORDER — HYDROCORTISONE SODIUM SUCCINATE 100 MG/2ML
100 INJECTION INTRAMUSCULAR; INTRAVENOUS
OUTPATIENT
Start: 2025-04-20

## 2025-03-25 RX ADMIN — LEUPROLIDE ACETATE 3.75 MG: KIT at 07:55

## 2025-03-25 ASSESSMENT — PATIENT HEALTH QUESTIONNAIRE - PHQ9
SUM OF ALL RESPONSES TO PHQ QUESTIONS 1-9: 0
SUM OF ALL RESPONSES TO PHQ QUESTIONS 1-9: 0
2. FEELING DOWN, DEPRESSED OR HOPELESS: NOT AT ALL
SUM OF ALL RESPONSES TO PHQ QUESTIONS 1-9: 0
1. LITTLE INTEREST OR PLEASURE IN DOING THINGS: NOT AT ALL
SUM OF ALL RESPONSES TO PHQ QUESTIONS 1-9: 0

## 2025-03-25 ASSESSMENT — PAIN SCALES - GENERAL: PAINLEVEL_OUTOF10: 0

## 2025-03-25 NOTE — PROGRESS NOTES
Lindsay Schuler is a 54 y.o. female is here today for a breast cancer follow up.    1. Have you been to the ER, urgent care clinic since your last visit?  Hospitalized since your last visit?No    2. Have you seen or consulted any other health care providers outside of the [facility] since your last visit?  Include any pap smears or colon screening. No     3/25/2025  7:36 AM   Vitals    SYSTOLIC 111    DIASTOLIC 63    BP Site    Patient Position    BP Cuff Size    Pulse 56    Temp 98 °F (36.7 °C)    Respirations 16    SpO2 99 %    Weight - Scale 112 lb 11.2 oz    Height 4' 10\"    Body Mass Index 23.55 kg/m2    Pain Score    Pain Loc    Pain Level 0        
medications for this visit.      Allergies   Allergen Reactions    Chlorhexidine Gluconate Rash        Review of Systems: A complete review of systems was obtained, negative except as described above.    Physical Exam:   /63   Pulse 56   Temp 98 °F (36.7 °C) (Temporal)   Resp 16   Ht 1.473 m (4' 10\")   Wt 50.8 kg (112 lb)   SpO2 99%   BMI 23.41 kg/m²   ECOG PS: 0    Constitutional: Appears well-developed and well-nourished in no apparent distress      Mental status: Alert and awake, Oriented to person/place/time, Able to follow commands    Eyes: EOM normal, Sclera normal, No visible ocular discharge    HENT: Normocephalic, atraumatic    Mouth/Throat: Moist mucous membranes    External Ears normal    Neck: No visualized mass    Pulmonary/Chest: Respiratory effort normal, No visualized signs of difficulty breathing or respiratory distress    Musculoskeletal: Normal gait with no signs of ataxia, Normal range of motion of neck    Neurological: No facial asymmetry (Cranial nerve 7 motor function), No gaze palsy    Skin: No significant exanthematous lesions or discoloration noted on facial skin    Psychiatric: Normal affect, No hallucinations     Breasts:  L breast with 1.8 cm mass at 12:00, right at nipple, no LAD       Results:     Lab Results   Component Value Date/Time    WBC 2.5 03/25/2025 07:38 AM    HGB 13.4 03/25/2025 07:38 AM    HCT 40.0 03/25/2025 07:38 AM     03/25/2025 07:38 AM    MCV 98.3 03/25/2025 07:38 AM     Lab Results   Component Value Date/Time     02/24/2025 07:33 AM    K 4.4 02/24/2025 07:33 AM     02/24/2025 07:33 AM    CO2 26 02/24/2025 07:33 AM    BUN 27 02/24/2025 07:33 AM     Lab Results   Component Value Date/Time    ALT 25 02/24/2025 07:33 AM    GLOB 3.4 02/24/2025 07:33 AM       5/19/23 bone scan  FINDINGS: There is focal increased activity right clavicular head where there is  a sclerotic lesion.. Low-grade activity mid cervical spine is most

## 2025-03-25 NOTE — PROGRESS NOTES
[facility] Progress Note        Date: 25    Name: Lindsay Schuler    MRN: [id number]         : 1970    MD: Brandon Saldana MD       Ms. Schuler admitted to [facility] for Lupron ambulatory in stable condition. Assessment completed, no acute issues at this time. No new concerns voiced. Labs drawn peripherally and sent for processing. Labs pending in [facility].        Vitals:    25 0736   BP: 111/63   Pulse: 56   Resp: 16   Temp: 98 °F (36.7 °C)   TempSrc: Temporal   SpO2: 99%   Weight: 51.1 kg (112 lb 11.2 oz)   Height: 1.473 m (4' 10\")         Results for orders placed or performed during the hospital encounter of 25   CBC with Auto Differential   Result Value Ref Range    WBC 2.5 (L) 3.6 - 11.0 K/uL    RBC 4.07 3.80 - 5.20 M/uL    Hemoglobin 13.4 11.5 - 16.0 g/dL    Hematocrit 40.0 35.0 - 47.0 %    MCV 98.3 80.0 - 99.0 FL    MCH 32.9 26.0 - 34.0 PG    MCHC 33.5 30.0 - 36.5 g/dL    RDW 12.4 11.5 - 14.5 %    Platelets 241 150 - 400 K/uL    MPV 9.0 8.9 - 12.9 FL    Nucleated RBCs 0.0 0  WBC    nRBC 0.00 0.00 - 0.01 K/uL    Neutrophils % PENDING %    Lymphocytes % PENDING %    Monocytes % PENDING %    Eosinophils % PENDING %    Basophils % PENDING %    Immature Granulocytes % PENDING %    Neutrophils Absolute PENDING K/UL    Lymphocytes Absolute PENDING K/UL    Monocytes Absolute PENDING K/UL    Eosinophils Absolute PENDING K/UL    Basophils Absolute PENDING K/UL    Immature Granulocytes Absolute PENDING K/UL    Differential Type PENDING              Medications:  MEDICATIONS GIVEN:  Medications Administered         leuprolide (LUPRON) injection 3.75 mg Admin Date  2025 Action  Given Dose  3.75 mg Route  IntraMUSCular Documented By  Chas Knott, ABAD          Administered in [facility].        Pt tolerated treatment well, no adverse reactions noted. D/c home ambulatory in no distress. Patient is aware of next appointment on 2025 @ 1130 at the [facility]note 3[facility]  location.        Future Appointments:  Future Appointments   Date Time Provider Department Center   4/21/2025 11:30 AM SS FASTTRACK 2 [facility]   5/19/2025  1:30 PM SS FASTTRACK 1 [facility]   6/16/2025  2:00 PM SS FASTTRACK 3 [facility]   7/14/2025  1:30 PM SS FASTTRACK 1 [facility]   8/11/2025  1:30 PM SS FASTTRACK 1 [facility]   9/8/2025  1:30 PM SS FASTTRACK 1 [facility]   12/9/2025  1:30 PM SS FASTTRACK 1 [facility]

## 2025-03-31 ENCOUNTER — RESULTS FOLLOW-UP (OUTPATIENT)
Age: 55
End: 2025-03-31

## 2025-03-31 DIAGNOSIS — C50.919 MALIGNANT NEOPLASM OF BREAST, STAGE 1, UNSPECIFIED ESTROGEN RECEPTOR STATUS, UNSPECIFIED LATERALITY: ICD-10-CM

## 2025-03-31 DIAGNOSIS — R73.09 ELEVATED GLUCOSE LEVEL: ICD-10-CM

## 2025-03-31 LAB
ALBUMIN SERPL-MCNC: 3.9 G/DL (ref 3.5–5)
ALBUMIN/GLOB SERPL: 1.2 (ref 1.1–2.2)
ALP SERPL-CCNC: 65 U/L (ref 45–117)
ALT SERPL-CCNC: 46 U/L (ref 12–78)
ANION GAP SERPL CALC-SCNC: 3 MMOL/L (ref 2–12)
AST SERPL-CCNC: 31 U/L (ref 15–37)
BASOPHILS # BLD: 0.08 K/UL (ref 0–0.1)
BASOPHILS NFR BLD: 1.8 % (ref 0–1)
BILIRUB SERPL-MCNC: 0.3 MG/DL (ref 0.2–1)
BUN SERPL-MCNC: 20 MG/DL (ref 6–20)
BUN/CREAT SERPL: 22 (ref 12–20)
CALCIUM SERPL-MCNC: 9.9 MG/DL (ref 8.5–10.1)
CHLORIDE SERPL-SCNC: 105 MMOL/L (ref 97–108)
CO2 SERPL-SCNC: 30 MMOL/L (ref 21–32)
CREAT SERPL-MCNC: 0.91 MG/DL (ref 0.55–1.02)
DIFFERENTIAL METHOD BLD: ABNORMAL
EOSINOPHIL # BLD: 0.13 K/UL (ref 0–0.4)
EOSINOPHIL NFR BLD: 2.9 % (ref 0–7)
ERYTHROCYTE [DISTWIDTH] IN BLOOD BY AUTOMATED COUNT: 12.2 % (ref 11.5–14.5)
GLOBULIN SER CALC-MCNC: 3.2 G/DL (ref 2–4)
GLUCOSE SERPL-MCNC: 118 MG/DL (ref 65–100)
HCT VFR BLD AUTO: 40.6 % (ref 35–47)
HGB BLD-MCNC: 13.6 G/DL (ref 11.5–16)
IMM GRANULOCYTES # BLD AUTO: 0.01 K/UL (ref 0–0.04)
IMM GRANULOCYTES NFR BLD AUTO: 0.2 % (ref 0–0.5)
LYMPHOCYTES # BLD: 1.91 K/UL (ref 0.8–3.5)
LYMPHOCYTES NFR BLD: 43.3 % (ref 12–49)
MCH RBC QN AUTO: 33.3 PG (ref 26–34)
MCHC RBC AUTO-ENTMCNC: 33.5 G/DL (ref 30–36.5)
MCV RBC AUTO: 99.3 FL (ref 80–99)
MONOCYTES # BLD: 0.49 K/UL (ref 0–1)
MONOCYTES NFR BLD: 11.1 % (ref 5–13)
NEUTS SEG # BLD: 1.79 K/UL (ref 1.8–8)
NEUTS SEG NFR BLD: 40.7 % (ref 32–75)
NRBC # BLD: 0 K/UL (ref 0–0.01)
NRBC BLD-RTO: 0 PER 100 WBC
PLATELET # BLD AUTO: 295 K/UL (ref 150–400)
PMV BLD AUTO: 9.6 FL (ref 8.9–12.9)
POTASSIUM SERPL-SCNC: 4.7 MMOL/L (ref 3.5–5.1)
PROT SERPL-MCNC: 7.1 G/DL (ref 6.4–8.2)
RBC # BLD AUTO: 4.09 M/UL (ref 3.8–5.2)
SODIUM SERPL-SCNC: 138 MMOL/L (ref 136–145)
WBC # BLD AUTO: 4.4 K/UL (ref 3.6–11)

## 2025-03-31 NOTE — TELEPHONE ENCOUNTER
[facility]  [phone]    3/31/25 1700 Called patient to inform that per Dr. Saldana she is okay to restart her ribociclib as planned. No answer, left massage to call with any questions

## 2025-03-31 NOTE — TELEPHONE ENCOUNTER
----- Message from Dr. Brandon Saldana MD sent at 3/31/2025  4:33 PM EDT -----  She may restart the ribociclib

## 2025-04-15 RX ORDER — ZOLEDRONIC ACID 0.04 MG/ML
4 INJECTION, SOLUTION INTRAVENOUS ONCE
OUTPATIENT
Start: 2025-05-19 | End: 2025-05-19

## 2025-04-15 RX ORDER — SODIUM CHLORIDE 9 MG/ML
5-250 INJECTION, SOLUTION INTRAVENOUS PRN
OUTPATIENT
Start: 2025-05-19

## 2025-04-22 ENCOUNTER — RESULTS FOLLOW-UP (OUTPATIENT)
Age: 55
End: 2025-04-22

## 2025-04-22 ENCOUNTER — HOSPITAL ENCOUNTER (OUTPATIENT)
Facility: HOSPITAL | Age: 55
Setting detail: INFUSION SERIES
Discharge: HOME | End: 2025-04-22
Payer: COMMERCIAL

## 2025-04-22 ENCOUNTER — APPOINTMENT (OUTPATIENT)
Facility: HOSPITAL | Age: 55
End: 2025-04-22
Payer: COMMERCIAL

## 2025-04-22 VITALS
HEART RATE: 62 BPM | BODY MASS INDEX: 23.63 KG/M2 | RESPIRATION RATE: 16 BRPM | TEMPERATURE: 97.7 F | OXYGEN SATURATION: 99 % | SYSTOLIC BLOOD PRESSURE: 107 MMHG | DIASTOLIC BLOOD PRESSURE: 68 MMHG | WEIGHT: 112.6 LBS | HEIGHT: 58 IN

## 2025-04-22 DIAGNOSIS — C50.919 MALIGNANT NEOPLASM OF BREAST, STAGE 1, UNSPECIFIED ESTROGEN RECEPTOR STATUS, UNSPECIFIED LATERALITY (HCC): ICD-10-CM

## 2025-04-22 DIAGNOSIS — R73.09 ELEVATED GLUCOSE LEVEL: Primary | ICD-10-CM

## 2025-04-22 LAB
ALBUMIN SERPL-MCNC: 3.8 G/DL (ref 3.5–5)
ALBUMIN/GLOB SERPL: 1.2 (ref 1.1–2.2)
ALP SERPL-CCNC: 58 U/L (ref 45–117)
ALT SERPL-CCNC: 38 U/L (ref 12–78)
ANION GAP SERPL CALC-SCNC: 4 MMOL/L (ref 2–12)
AST SERPL-CCNC: 27 U/L (ref 15–37)
BASOPHILS # BLD: 0.05 K/UL (ref 0–0.1)
BASOPHILS NFR BLD: 1.4 % (ref 0–1)
BILIRUB SERPL-MCNC: 0.3 MG/DL (ref 0.2–1)
BUN SERPL-MCNC: 20 MG/DL (ref 6–20)
BUN/CREAT SERPL: 22 (ref 12–20)
CALCIUM SERPL-MCNC: 9.1 MG/DL (ref 8.5–10.1)
CHLORIDE SERPL-SCNC: 106 MMOL/L (ref 97–108)
CO2 SERPL-SCNC: 29 MMOL/L (ref 21–32)
CREAT SERPL-MCNC: 0.91 MG/DL (ref 0.55–1.02)
DIFFERENTIAL METHOD BLD: ABNORMAL
EOSINOPHIL # BLD: 0.15 K/UL (ref 0–0.4)
EOSINOPHIL NFR BLD: 4.1 % (ref 0–7)
ERYTHROCYTE [DISTWIDTH] IN BLOOD BY AUTOMATED COUNT: 12 % (ref 11.5–14.5)
GLOBULIN SER CALC-MCNC: 3.2 G/DL (ref 2–4)
GLUCOSE SERPL-MCNC: 117 MG/DL (ref 65–100)
HCT VFR BLD AUTO: 39.8 % (ref 35–47)
HGB BLD-MCNC: 13.5 G/DL (ref 11.5–16)
IMM GRANULOCYTES # BLD AUTO: 0.01 K/UL (ref 0–0.04)
IMM GRANULOCYTES NFR BLD AUTO: 0.3 % (ref 0–0.5)
LYMPHOCYTES # BLD: 1.72 K/UL (ref 0.8–3.5)
LYMPHOCYTES NFR BLD: 47 % (ref 12–49)
MCH RBC QN AUTO: 33.3 PG (ref 26–34)
MCHC RBC AUTO-ENTMCNC: 33.9 G/DL (ref 30–36.5)
MCV RBC AUTO: 98 FL (ref 80–99)
MONOCYTES # BLD: 0.41 K/UL (ref 0–1)
MONOCYTES NFR BLD: 11.2 % (ref 5–13)
NEUTS SEG # BLD: 1.32 K/UL (ref 1.8–8)
NEUTS SEG NFR BLD: 36 % (ref 32–75)
NRBC # BLD: 0 K/UL (ref 0–0.01)
NRBC BLD-RTO: 0 PER 100 WBC
PLATELET # BLD AUTO: 273 K/UL (ref 150–400)
PMV BLD AUTO: 9.4 FL (ref 8.9–12.9)
POTASSIUM SERPL-SCNC: 4.2 MMOL/L (ref 3.5–5.1)
PROT SERPL-MCNC: 7 G/DL (ref 6.4–8.2)
RBC # BLD AUTO: 4.06 M/UL (ref 3.8–5.2)
SODIUM SERPL-SCNC: 139 MMOL/L (ref 136–145)
WBC # BLD AUTO: 3.7 K/UL (ref 3.6–11)

## 2025-04-22 PROCEDURE — 6360000002 HC RX W HCPCS: Performed by: INTERNAL MEDICINE

## 2025-04-22 PROCEDURE — 80053 COMPREHEN METABOLIC PANEL: CPT

## 2025-04-22 PROCEDURE — 85025 COMPLETE CBC W/AUTO DIFF WBC: CPT

## 2025-04-22 PROCEDURE — 96402 CHEMO HORMON ANTINEOPL SQ/IM: CPT

## 2025-04-22 PROCEDURE — 36415 COLL VENOUS BLD VENIPUNCTURE: CPT

## 2025-04-22 RX ORDER — SODIUM CHLORIDE 9 MG/ML
INJECTION, SOLUTION INTRAVENOUS CONTINUOUS
OUTPATIENT
Start: 2025-05-19

## 2025-04-22 RX ORDER — DIPHENHYDRAMINE HYDROCHLORIDE 50 MG/ML
50 INJECTION, SOLUTION INTRAMUSCULAR; INTRAVENOUS
OUTPATIENT
Start: 2025-05-19

## 2025-04-22 RX ORDER — ONDANSETRON 2 MG/ML
8 INJECTION INTRAMUSCULAR; INTRAVENOUS
OUTPATIENT
Start: 2025-05-19

## 2025-04-22 RX ORDER — EPINEPHRINE 1 MG/ML
0.3 INJECTION, SOLUTION INTRAMUSCULAR; SUBCUTANEOUS PRN
OUTPATIENT
Start: 2025-05-19

## 2025-04-22 RX ORDER — HYDROCORTISONE SODIUM SUCCINATE 100 MG/2ML
100 INJECTION INTRAMUSCULAR; INTRAVENOUS
OUTPATIENT
Start: 2025-05-19

## 2025-04-22 RX ORDER — ACETAMINOPHEN 325 MG/1
650 TABLET ORAL
OUTPATIENT
Start: 2025-05-19

## 2025-04-22 RX ORDER — ALBUTEROL SULFATE 90 UG/1
4 INHALANT RESPIRATORY (INHALATION) PRN
OUTPATIENT
Start: 2025-05-19

## 2025-04-22 RX ORDER — FAMOTIDINE 10 MG/ML
20 INJECTION, SOLUTION INTRAVENOUS
OUTPATIENT
Start: 2025-05-19

## 2025-04-22 RX ADMIN — LEUPROLIDE ACETATE 3.75 MG: KIT at 07:43

## 2025-04-22 ASSESSMENT — PAIN SCALES - GENERAL: PAINLEVEL_OUTOF10: 0

## 2025-04-22 NOTE — TELEPHONE ENCOUNTER
----- Message from Dr. Brandon Saldana MD sent at 4/22/2025  9:29 AM EDT -----  Please let her know ok to resume ribociclib when she is supposed to

## 2025-04-22 NOTE — PROGRESS NOTES
Outpatient Infusion Center Progress Note        Date: 25    Name: Lindsay Schuler    MRN: [id number]         : 1970    MD: Brandon Saldana MD       Ms. Schuler admitted to [facility] for Maintenance Lupron ambulatory in stable condition. Assessment completed. No new concerns voiced. Labs drawn peripherally and sent for processing.      ** Patient has received this medication in the past. Patient reports no previous reactions to the medication(s).        Vitals:    25 0726   BP: 107/68   Pulse: 62   Resp: 16   Temp: 97.7 °F (36.5 °C)   TempSrc: Temporal   SpO2: 99%   Weight: 51.1 kg (112 lb 9.6 oz)   Height: 1.473 m (4' 10\")             Medications:  MEDICATIONS GIVEN:  Medications Administered         leuprolide (LUPRON) injection 3.75 mg Admin Date  2025 Action  Given Dose  3.75 mg Route  IntraMUSCular Documented By  Luna Olivera RN          Given in the [facility]    Pt tolerated treatment well. D/c home ambulatory in no distress. Patient is aware of next appointment on 25 @ 0830 at the [facility] location.        Future Appointments:  Future Appointments   Date Time Provider Department Center   5/15/2025  7:30 AM [facility] NM INJ RM 1 [facility]NM [facility]   5/15/2025  8:30 AM [facility] CT 1 SFMRCT [facility]   5/15/2025 10:30 AM [facility] NM RM 1 [facility]   2025  8:30 AM SS FASTTRACK 2 [facility] IN[facility]   2025  9:00 AM Brandon Saldana MD [facility] BS AMB   2025  7:30 AM SS FASTTRACK 1 [facility]   7/15/2025  7:30 AM SS FASTTRACK 1 [facility]   2025  7:30 AM SS FASTTRACK 1 [facility]   2025  7:30 AM SS FASTTRACK 1 [facility]   2025  1:30 PM SS FASTTRACK 1 [facility]

## 2025-04-22 NOTE — TELEPHONE ENCOUNTER
[facility] [place] [facility]  [phone]    4/22/25 6434 Call placed to patient regarding lab results. Informed per Dr. Saldana she is is okay to resume ribociclib as planned. Patient stated understanding and has no further questions at this time.

## 2025-05-05 ENCOUNTER — APPOINTMENT (OUTPATIENT)
Facility: HOSPITAL | Age: 55
End: 2025-05-05
Payer: COMMERCIAL

## 2025-05-14 RX ORDER — IOPAMIDOL 755 MG/ML
100 INJECTION, SOLUTION INTRAVASCULAR
Status: COMPLETED | OUTPATIENT
Start: 2025-05-14 | End: 2025-05-15

## 2025-05-15 ENCOUNTER — HOSPITAL ENCOUNTER (OUTPATIENT)
Facility: HOSPITAL | Age: 55
Discharge: HOME | End: 2025-05-18
Payer: COMMERCIAL

## 2025-05-15 DIAGNOSIS — C50.919 CARCINOMA OF BREAST METASTATIC TO BONE, UNSPECIFIED LATERALITY (HCC): ICD-10-CM

## 2025-05-15 DIAGNOSIS — C79.51 CARCINOMA OF BREAST METASTATIC TO BONE, UNSPECIFIED LATERALITY (HCC): ICD-10-CM

## 2025-05-15 DIAGNOSIS — Z17.0 MALIGNANT NEOPLASM OF CENTRAL PORTION OF LEFT BREAST IN FEMALE, ESTROGEN RECEPTOR POSITIVE (HCC): ICD-10-CM

## 2025-05-15 DIAGNOSIS — C50.112 MALIGNANT NEOPLASM OF CENTRAL PORTION OF LEFT BREAST IN FEMALE, ESTROGEN RECEPTOR POSITIVE (HCC): ICD-10-CM

## 2025-05-15 PROCEDURE — 6360000004 HC RX CONTRAST MEDICATION: Performed by: NURSE PRACTITIONER

## 2025-05-15 PROCEDURE — A9503 TC99M MEDRONATE: HCPCS | Performed by: NURSE PRACTITIONER

## 2025-05-15 PROCEDURE — 78306 BONE IMAGING WHOLE BODY: CPT | Performed by: NURSE PRACTITIONER

## 2025-05-15 PROCEDURE — 74177 CT ABD & PELVIS W/CONTRAST: CPT

## 2025-05-15 PROCEDURE — 3430000000 HC RX DIAGNOSTIC RADIOPHARMACEUTICAL: Performed by: NURSE PRACTITIONER

## 2025-05-15 RX ORDER — TC 99M MEDRONATE 20 MG/10ML
25 INJECTION, POWDER, LYOPHILIZED, FOR SOLUTION INTRAVENOUS
Status: COMPLETED | OUTPATIENT
Start: 2025-05-15 | End: 2025-05-15

## 2025-05-15 RX ADMIN — IOPAMIDOL 100 ML: 755 INJECTION, SOLUTION INTRAVENOUS at 07:51

## 2025-05-15 RX ADMIN — TC 99M MEDRONATE 25 MILLICURIE: 20 INJECTION, POWDER, LYOPHILIZED, FOR SOLUTION INTRAVENOUS at 07:43

## 2025-05-19 ENCOUNTER — APPOINTMENT (OUTPATIENT)
Facility: HOSPITAL | Age: 55
End: 2025-05-19
Payer: COMMERCIAL

## 2025-05-19 ENCOUNTER — HOSPITAL ENCOUNTER (OUTPATIENT)
Facility: HOSPITAL | Age: 55
Setting detail: INFUSION SERIES
Discharge: HOME | End: 2025-05-19
Payer: COMMERCIAL

## 2025-05-19 ENCOUNTER — OFFICE VISIT (OUTPATIENT)
Age: 55
End: 2025-05-19
Payer: COMMERCIAL

## 2025-05-19 VITALS
BODY MASS INDEX: 23.93 KG/M2 | SYSTOLIC BLOOD PRESSURE: 138 MMHG | HEART RATE: 65 BPM | TEMPERATURE: 97.7 F | HEIGHT: 58 IN | OXYGEN SATURATION: 99 % | RESPIRATION RATE: 18 BRPM | DIASTOLIC BLOOD PRESSURE: 65 MMHG | WEIGHT: 114 LBS

## 2025-05-19 VITALS
WEIGHT: 114 LBS | HEART RATE: 65 BPM | OXYGEN SATURATION: 99 % | HEIGHT: 57 IN | DIASTOLIC BLOOD PRESSURE: 65 MMHG | TEMPERATURE: 97.7 F | SYSTOLIC BLOOD PRESSURE: 138 MMHG | RESPIRATION RATE: 18 BRPM | BODY MASS INDEX: 24.59 KG/M2

## 2025-05-19 DIAGNOSIS — Z17.0 MALIGNANT NEOPLASM OF CENTRAL PORTION OF LEFT BREAST IN FEMALE, ESTROGEN RECEPTOR POSITIVE (HCC): ICD-10-CM

## 2025-05-19 DIAGNOSIS — C50.919 MALIGNANT NEOPLASM OF BREAST, STAGE 1, UNSPECIFIED ESTROGEN RECEPTOR STATUS, UNSPECIFIED LATERALITY (HCC): ICD-10-CM

## 2025-05-19 DIAGNOSIS — C79.51 CARCINOMA OF BREAST METASTATIC TO BONE, UNSPECIFIED LATERALITY (HCC): Primary | ICD-10-CM

## 2025-05-19 DIAGNOSIS — C50.112 MALIGNANT NEOPLASM OF CENTRAL PORTION OF LEFT BREAST IN FEMALE, ESTROGEN RECEPTOR POSITIVE (HCC): ICD-10-CM

## 2025-05-19 DIAGNOSIS — R73.09 ELEVATED GLUCOSE LEVEL: Primary | ICD-10-CM

## 2025-05-19 DIAGNOSIS — Z51.81 ENCOUNTER FOR THERAPEUTIC DRUG LEVEL MONITORING: ICD-10-CM

## 2025-05-19 DIAGNOSIS — C50.919 CARCINOMA OF BREAST METASTATIC TO BONE, UNSPECIFIED LATERALITY (HCC): Primary | ICD-10-CM

## 2025-05-19 LAB
ALBUMIN SERPL-MCNC: 3.8 G/DL (ref 3.5–5)
ALBUMIN/GLOB SERPL: 1.1 (ref 1.1–2.2)
ALP SERPL-CCNC: 68 U/L (ref 45–117)
ALT SERPL-CCNC: 28 U/L (ref 12–78)
ANION GAP BLD CALC-SCNC: 9.2 MMOL/L (ref 10–20)
ANION GAP SERPL CALC-SCNC: 5 MMOL/L (ref 2–12)
AST SERPL-CCNC: 24 U/L (ref 15–37)
BASOPHILS # BLD: 0.05 K/UL (ref 0–0.1)
BASOPHILS NFR BLD: 1.4 % (ref 0–1)
BILIRUB SERPL-MCNC: 0.3 MG/DL (ref 0.2–1)
BUN SERPL-MCNC: 22 MG/DL (ref 6–20)
BUN/CREAT SERPL: 22 (ref 12–20)
CA-I BLD-MCNC: 1.22 MMOL/L (ref 1.15–1.33)
CALCIUM SERPL-MCNC: 9.2 MG/DL (ref 8.5–10.1)
CHLORIDE BLD-SCNC: 106 MMOL/L (ref 98–107)
CHLORIDE SERPL-SCNC: 107 MMOL/L (ref 97–108)
CO2 BLD-SCNC: 24.8 MMOL/L (ref 21–32)
CO2 SERPL-SCNC: 25 MMOL/L (ref 21–32)
CREAT BLD-MCNC: 0.91 MG/DL (ref 0.6–1.3)
CREAT SERPL-MCNC: 0.99 MG/DL (ref 0.55–1.02)
DIFFERENTIAL METHOD BLD: ABNORMAL
EOSINOPHIL # BLD: 0.12 K/UL (ref 0–0.4)
EOSINOPHIL NFR BLD: 3.4 % (ref 0–7)
ERYTHROCYTE [DISTWIDTH] IN BLOOD BY AUTOMATED COUNT: 11.7 % (ref 11.5–14.5)
GLOBULIN SER CALC-MCNC: 3.5 G/DL (ref 2–4)
GLUCOSE BLD-MCNC: 120 MG/DL (ref 74–99)
GLUCOSE SERPL-MCNC: 132 MG/DL (ref 65–100)
HCT VFR BLD AUTO: 40.8 % (ref 35–47)
HGB BLD-MCNC: 13.9 G/DL (ref 11.5–16)
IMM GRANULOCYTES # BLD AUTO: 0.01 K/UL (ref 0–0.04)
IMM GRANULOCYTES NFR BLD AUTO: 0.3 % (ref 0–0.5)
LYMPHOCYTES # BLD: 1.68 K/UL (ref 0.8–3.5)
LYMPHOCYTES NFR BLD: 47.7 % (ref 12–49)
MCH RBC QN AUTO: 33.3 PG (ref 26–34)
MCHC RBC AUTO-ENTMCNC: 34.1 G/DL (ref 30–36.5)
MCV RBC AUTO: 97.6 FL (ref 80–99)
MONOCYTES # BLD: 0.39 K/UL (ref 0–1)
MONOCYTES NFR BLD: 11.1 % (ref 5–13)
NEUTS SEG # BLD: 1.27 K/UL (ref 1.8–8)
NEUTS SEG NFR BLD: 36.1 % (ref 32–75)
NRBC # BLD: 0 K/UL (ref 0–0.01)
NRBC BLD-RTO: 0 PER 100 WBC
PLATELET # BLD AUTO: 289 K/UL (ref 150–400)
PMV BLD AUTO: 9.1 FL (ref 8.9–12.9)
POTASSIUM BLD-SCNC: 4.3 MMOL/L (ref 3.5–5.1)
POTASSIUM SERPL-SCNC: 4.3 MMOL/L (ref 3.5–5.1)
PROT SERPL-MCNC: 7.3 G/DL (ref 6.4–8.2)
RBC # BLD AUTO: 4.18 M/UL (ref 3.8–5.2)
SERVICE CMNT-IMP: ABNORMAL
SODIUM BLD-SCNC: 140 MMOL/L (ref 136–145)
SODIUM SERPL-SCNC: 137 MMOL/L (ref 136–145)
WBC # BLD AUTO: 3.5 K/UL (ref 3.6–11)

## 2025-05-19 PROCEDURE — 80047 BASIC METABLC PNL IONIZED CA: CPT

## 2025-05-19 PROCEDURE — 2580000003 HC RX 258: Performed by: INTERNAL MEDICINE

## 2025-05-19 PROCEDURE — 99215 OFFICE O/P EST HI 40 MIN: CPT | Performed by: NURSE PRACTITIONER

## 2025-05-19 PROCEDURE — 36415 COLL VENOUS BLD VENIPUNCTURE: CPT

## 2025-05-19 PROCEDURE — 85025 COMPLETE CBC W/AUTO DIFF WBC: CPT

## 2025-05-19 PROCEDURE — 96365 THER/PROPH/DIAG IV INF INIT: CPT

## 2025-05-19 PROCEDURE — 80053 COMPREHEN METABOLIC PANEL: CPT

## 2025-05-19 PROCEDURE — 6360000002 HC RX W HCPCS: Performed by: INTERNAL MEDICINE

## 2025-05-19 PROCEDURE — 96402 CHEMO HORMON ANTINEOPL SQ/IM: CPT

## 2025-05-19 RX ORDER — SODIUM CHLORIDE 9 MG/ML
INJECTION, SOLUTION INTRAVENOUS CONTINUOUS
OUTPATIENT
Start: 2025-06-17

## 2025-05-19 RX ORDER — HYDROCORTISONE SODIUM SUCCINATE 100 MG/2ML
100 INJECTION INTRAMUSCULAR; INTRAVENOUS
OUTPATIENT
Start: 2025-06-17

## 2025-05-19 RX ORDER — ONDANSETRON 2 MG/ML
8 INJECTION INTRAMUSCULAR; INTRAVENOUS
OUTPATIENT
Start: 2025-06-17

## 2025-05-19 RX ORDER — DIPHENHYDRAMINE HYDROCHLORIDE 50 MG/ML
50 INJECTION, SOLUTION INTRAMUSCULAR; INTRAVENOUS
OUTPATIENT
Start: 2025-06-17

## 2025-05-19 RX ORDER — SODIUM CHLORIDE 9 MG/ML
5-250 INJECTION, SOLUTION INTRAVENOUS PRN
OUTPATIENT
Start: 2025-08-11

## 2025-05-19 RX ORDER — SODIUM CHLORIDE 0.9 % (FLUSH) 0.9 %
5-40 SYRINGE (ML) INJECTION PRN
OUTPATIENT
Start: 2025-08-11

## 2025-05-19 RX ORDER — ALBUTEROL SULFATE 90 UG/1
4 INHALANT RESPIRATORY (INHALATION) PRN
OUTPATIENT
Start: 2025-06-17

## 2025-05-19 RX ORDER — EPINEPHRINE 1 MG/ML
0.3 INJECTION, SOLUTION INTRAMUSCULAR; SUBCUTANEOUS PRN
OUTPATIENT
Start: 2025-06-17

## 2025-05-19 RX ORDER — ONDANSETRON 2 MG/ML
8 INJECTION INTRAMUSCULAR; INTRAVENOUS
OUTPATIENT
Start: 2025-08-11

## 2025-05-19 RX ORDER — ACETAMINOPHEN 325 MG/1
650 TABLET ORAL
OUTPATIENT
Start: 2025-08-11

## 2025-05-19 RX ORDER — FAMOTIDINE 10 MG/ML
20 INJECTION, SOLUTION INTRAVENOUS
OUTPATIENT
Start: 2025-06-17

## 2025-05-19 RX ORDER — HEPARIN 100 UNIT/ML
500 SYRINGE INTRAVENOUS PRN
OUTPATIENT
Start: 2025-08-11

## 2025-05-19 RX ORDER — HYDROCORTISONE SODIUM SUCCINATE 100 MG/2ML
100 INJECTION INTRAMUSCULAR; INTRAVENOUS
OUTPATIENT
Start: 2025-08-11

## 2025-05-19 RX ORDER — EPINEPHRINE 1 MG/ML
0.3 INJECTION, SOLUTION INTRAMUSCULAR; SUBCUTANEOUS PRN
OUTPATIENT
Start: 2025-08-11

## 2025-05-19 RX ORDER — DIPHENHYDRAMINE HYDROCHLORIDE 50 MG/ML
50 INJECTION, SOLUTION INTRAMUSCULAR; INTRAVENOUS
OUTPATIENT
Start: 2025-08-11

## 2025-05-19 RX ORDER — ZOLEDRONIC ACID 0.04 MG/ML
4 INJECTION, SOLUTION INTRAVENOUS ONCE
OUTPATIENT
Start: 2025-08-11 | End: 2025-08-11

## 2025-05-19 RX ORDER — SODIUM CHLORIDE 9 MG/ML
INJECTION, SOLUTION INTRAVENOUS CONTINUOUS
OUTPATIENT
Start: 2025-08-11

## 2025-05-19 RX ORDER — ALBUTEROL SULFATE 90 UG/1
4 INHALANT RESPIRATORY (INHALATION) PRN
OUTPATIENT
Start: 2025-08-11

## 2025-05-19 RX ORDER — ACETAMINOPHEN 325 MG/1
650 TABLET ORAL
OUTPATIENT
Start: 2025-06-17

## 2025-05-19 RX ORDER — SODIUM CHLORIDE 9 MG/ML
5-250 INJECTION, SOLUTION INTRAVENOUS PRN
Status: DISCONTINUED | OUTPATIENT
Start: 2025-05-19 | End: 2025-05-20 | Stop reason: HOSPADM

## 2025-05-19 RX ORDER — FAMOTIDINE 10 MG/ML
20 INJECTION, SOLUTION INTRAVENOUS
OUTPATIENT
Start: 2025-08-11

## 2025-05-19 RX ADMIN — LEUPROLIDE ACETATE 3.75 MG: KIT at 10:35

## 2025-05-19 RX ADMIN — ZOLEDRONIC ACID 3.5 MG: 4 INJECTION, SOLUTION, CONCENTRATE INTRAVENOUS at 10:37

## 2025-05-19 ASSESSMENT — PATIENT HEALTH QUESTIONNAIRE - PHQ9
2. FEELING DOWN, DEPRESSED OR HOPELESS: NOT AT ALL
1. LITTLE INTEREST OR PLEASURE IN DOING THINGS: NOT AT ALL
SUM OF ALL RESPONSES TO PHQ QUESTIONS 1-9: 0

## 2025-05-19 ASSESSMENT — PAIN SCALES - GENERAL: PAINLEVEL_OUTOF10: 5

## 2025-05-19 NOTE — PROGRESS NOTES
Total Bilirubin 0.3 0.2 - 1.0 MG/DL    ALT 28 12 - 78 U/L    AST 24 15 - 37 U/L    Alk Phosphatase 68 45 - 117 U/L    Total Protein 7.3 6.4 - 8.2 g/dL    Albumin 3.8 3.5 - 5.0 g/dL    Globulin 3.5 2.0 - 4.0 g/dL    Albumin/Globulin Ratio 1.1 1.1 - 2.2     POC CHEM 8    Collection Time: 05/19/25  8:49 AM   Result Value Ref Range    POC Ionized Calcium 1.22 1.15 - 1.33 mmol/L    POC Sodium 140 136 - 145 mmol/L    POC Potassium 4.3 3.5 - 5.1 mmol/L    POC Chloride 106 98 - 107 mmol/L    POC TCO2 24.8 21 - 32 mmol/L    Anion Gap, POC 9.2 (L) 10 - 20 mmol/L    POC Glucose 120 (H) 74 - 99 mg/dL    POC Creatinine 0.91 0.6 - 1.3 mg/dL    eGFR, POC 76 >60 ml/min/1.73m2    UA Comment Comment Not Indicated.           Medications Administered         leuprolide (LUPRON) injection 3.75 mg Admin Date  05/19/2025 Action  Given Dose  3.75 mg Rate   Route  IntraMUSCular Documented By  Kiran Niño RN        zoledronic acid (ZOMETA) 3.5 mg in sodium chloride 0.9 % 100 mL IVPB Admin Date  05/19/2025 Action  New Bag Dose  3.5 mg Rate  300 mL/hr Route  IntraVENous Documented By  Kiran Niño RN        Lupron RGM       Ms. Schuler tolerated the infusion, and had no complaints.    PIV flushed and removed. 2x2 and coban placed    Ms. Schuler was [facility] in stable condition. Patient is aware of next scheduled [facility] appointment.         Future Appointments   Date Time Provider Department Center   6/17/2025  7:30 AM SS FASTTRACK 1 [facility]   7/15/2025  7:30 AM SS FASTTRACK 1 [facility]   7/15/2025  8:30 AM Brandon Saldana MD [facility] BS AMB   8/12/2025  7:30 AM SS FASTTRACK 1 [facility]   9/9/2025  7:30 AM SS FASTTRACK 1 [facility]   12/9/2025  1:30 PM SS FASTTRACK 1 [facility]         KIRAN NIÑO, RN, RN  May 19, 2025

## 2025-05-19 NOTE — PROGRESS NOTES
Lindsay Schuler is a 54 y.o. female is here today for a follow up.    1. Have you been to the ER, urgent care clinic since your last visit?  Hospitalized since your last visit?No    2. Have you seen or consulted any other health care providers outside of the [facility] since your last visit?  Include any pap smears or colon screening. No       5/19/2025  8:36 AM   Vitals    SYSTOLIC 138    DIASTOLIC 65    BP Site    Patient Position    BP Cuff Size    Pulse 65    Temp 97.7 °F (36.5 °C)    Respirations 18    SpO2 99 %    Weight - Scale 114 lb    Height 4' 9.992\"    Body Mass Index 23.83 kg/m2    Pain Score    Pain Loc    Pain Level 5        
instruments. Switched back to anastrozole. This continues but tolerable.     I appreciate the opportunity to participate in Ms. Lindsay Schuler's care.    Signed By: VERONICA Jose NP      No follow-ups on file.

## 2025-05-20 ENCOUNTER — RESULTS FOLLOW-UP (OUTPATIENT)
Age: 55
End: 2025-05-20

## 2025-06-03 DIAGNOSIS — F33.9 RECURRENT DEPRESSION: ICD-10-CM

## 2025-06-03 DIAGNOSIS — C50.112 MALIGNANT NEOPLASM OF CENTRAL PORTION OF LEFT BREAST IN FEMALE, ESTROGEN RECEPTOR POSITIVE (HCC): ICD-10-CM

## 2025-06-03 DIAGNOSIS — Z17.0 MALIGNANT NEOPLASM OF CENTRAL PORTION OF LEFT BREAST IN FEMALE, ESTROGEN RECEPTOR POSITIVE (HCC): ICD-10-CM

## 2025-06-03 RX ORDER — ESCITALOPRAM OXALATE 10 MG/1
10 TABLET ORAL DAILY
Qty: 90 TABLET | Refills: 1 | Status: SHIPPED | OUTPATIENT
Start: 2025-06-03

## 2025-06-09 ENCOUNTER — PATIENT MESSAGE (OUTPATIENT)
Age: 55
End: 2025-06-09

## 2025-06-16 ENCOUNTER — APPOINTMENT (OUTPATIENT)
Facility: HOSPITAL | Age: 55
End: 2025-06-16
Payer: COMMERCIAL

## 2025-06-17 ENCOUNTER — HOSPITAL ENCOUNTER (OUTPATIENT)
Facility: HOSPITAL | Age: 55
Setting detail: INFUSION SERIES
Discharge: HOME | End: 2025-06-17
Payer: COMMERCIAL

## 2025-06-17 VITALS
DIASTOLIC BLOOD PRESSURE: 67 MMHG | TEMPERATURE: 97.2 F | SYSTOLIC BLOOD PRESSURE: 106 MMHG | HEART RATE: 61 BPM | OXYGEN SATURATION: 97 % | WEIGHT: 113.6 LBS | BODY MASS INDEX: 23.85 KG/M2 | RESPIRATION RATE: 16 BRPM | HEIGHT: 58 IN

## 2025-06-17 DIAGNOSIS — R73.09 ELEVATED GLUCOSE LEVEL: Primary | ICD-10-CM

## 2025-06-17 DIAGNOSIS — C50.919 MALIGNANT NEOPLASM OF BREAST, STAGE 1, UNSPECIFIED ESTROGEN RECEPTOR STATUS, UNSPECIFIED LATERALITY (HCC): ICD-10-CM

## 2025-06-17 LAB
ALBUMIN SERPL-MCNC: 3.7 G/DL (ref 3.5–5)
ALBUMIN/GLOB SERPL: 1.1 (ref 1.1–2.2)
ALP SERPL-CCNC: 66 U/L (ref 45–117)
ALT SERPL-CCNC: 48 U/L (ref 12–78)
ANION GAP SERPL CALC-SCNC: 6 MMOL/L (ref 2–12)
AST SERPL-CCNC: 38 U/L (ref 15–37)
BASOPHILS # BLD: 0.04 K/UL (ref 0–0.1)
BASOPHILS NFR BLD: 1.2 % (ref 0–1)
BILIRUB SERPL-MCNC: 0.3 MG/DL (ref 0.2–1)
BUN SERPL-MCNC: 20 MG/DL (ref 6–20)
BUN/CREAT SERPL: 24 (ref 12–20)
CALCIUM SERPL-MCNC: 8.6 MG/DL (ref 8.5–10.1)
CHLORIDE SERPL-SCNC: 108 MMOL/L (ref 97–108)
CO2 SERPL-SCNC: 24 MMOL/L (ref 21–32)
CREAT SERPL-MCNC: 0.82 MG/DL (ref 0.55–1.02)
DIFFERENTIAL METHOD BLD: ABNORMAL
EOSINOPHIL # BLD: 0.13 K/UL (ref 0–0.4)
EOSINOPHIL NFR BLD: 3.8 % (ref 0–7)
ERYTHROCYTE [DISTWIDTH] IN BLOOD BY AUTOMATED COUNT: 11.5 % (ref 11.5–14.5)
GLOBULIN SER CALC-MCNC: 3.4 G/DL (ref 2–4)
GLUCOSE SERPL-MCNC: 99 MG/DL (ref 65–100)
HCT VFR BLD AUTO: 39.6 % (ref 35–47)
HGB BLD-MCNC: 13.9 G/DL (ref 11.5–16)
IMM GRANULOCYTES # BLD AUTO: 0.01 K/UL (ref 0–0.04)
IMM GRANULOCYTES NFR BLD AUTO: 0.3 % (ref 0–0.5)
LYMPHOCYTES # BLD: 1.75 K/UL (ref 0.8–3.5)
LYMPHOCYTES NFR BLD: 51 % (ref 12–49)
MCH RBC QN AUTO: 33.3 PG (ref 26–34)
MCHC RBC AUTO-ENTMCNC: 35.1 G/DL (ref 30–36.5)
MCV RBC AUTO: 95 FL (ref 80–99)
MONOCYTES # BLD: 0.33 K/UL (ref 0–1)
MONOCYTES NFR BLD: 9.6 % (ref 5–13)
NEUTS SEG # BLD: 1.17 K/UL (ref 1.8–8)
NEUTS SEG NFR BLD: 34.1 % (ref 32–75)
NRBC # BLD: 0 K/UL (ref 0–0.01)
NRBC BLD-RTO: 0 PER 100 WBC
PLATELET # BLD AUTO: 241 K/UL (ref 150–400)
PMV BLD AUTO: 9.6 FL (ref 8.9–12.9)
POTASSIUM SERPL-SCNC: 4.3 MMOL/L (ref 3.5–5.1)
PROT SERPL-MCNC: 7.1 G/DL (ref 6.4–8.2)
RBC # BLD AUTO: 4.17 M/UL (ref 3.8–5.2)
SODIUM SERPL-SCNC: 138 MMOL/L (ref 136–145)
WBC # BLD AUTO: 3.4 K/UL (ref 3.6–11)

## 2025-06-17 PROCEDURE — 6360000002 HC RX W HCPCS: Performed by: INTERNAL MEDICINE

## 2025-06-17 PROCEDURE — 96402 CHEMO HORMON ANTINEOPL SQ/IM: CPT

## 2025-06-17 PROCEDURE — 85025 COMPLETE CBC W/AUTO DIFF WBC: CPT

## 2025-06-17 PROCEDURE — 80053 COMPREHEN METABOLIC PANEL: CPT

## 2025-06-17 PROCEDURE — 36415 COLL VENOUS BLD VENIPUNCTURE: CPT

## 2025-06-17 RX ORDER — ALBUTEROL SULFATE 90 UG/1
4 INHALANT RESPIRATORY (INHALATION) PRN
OUTPATIENT
Start: 2025-07-13

## 2025-06-17 RX ORDER — SODIUM CHLORIDE 9 MG/ML
INJECTION, SOLUTION INTRAVENOUS CONTINUOUS
OUTPATIENT
Start: 2025-07-13

## 2025-06-17 RX ORDER — ACETAMINOPHEN 325 MG/1
650 TABLET ORAL
OUTPATIENT
Start: 2025-07-13

## 2025-06-17 RX ORDER — EPINEPHRINE 1 MG/ML
0.3 INJECTION, SOLUTION INTRAMUSCULAR; SUBCUTANEOUS PRN
OUTPATIENT
Start: 2025-07-13

## 2025-06-17 RX ORDER — ONDANSETRON 2 MG/ML
8 INJECTION INTRAMUSCULAR; INTRAVENOUS
OUTPATIENT
Start: 2025-07-13

## 2025-06-17 RX ORDER — DIPHENHYDRAMINE HYDROCHLORIDE 50 MG/ML
50 INJECTION, SOLUTION INTRAMUSCULAR; INTRAVENOUS
OUTPATIENT
Start: 2025-07-13

## 2025-06-17 RX ORDER — HYDROCORTISONE SODIUM SUCCINATE 100 MG/2ML
100 INJECTION INTRAMUSCULAR; INTRAVENOUS
OUTPATIENT
Start: 2025-07-13

## 2025-06-17 RX ORDER — FAMOTIDINE 10 MG/ML
20 INJECTION, SOLUTION INTRAVENOUS
OUTPATIENT
Start: 2025-07-13

## 2025-06-17 RX ADMIN — LEUPROLIDE ACETATE 3.75 MG: KIT at 07:48

## 2025-06-17 ASSESSMENT — PAIN SCALES - GENERAL: PAINLEVEL_OUTOF10: 0

## 2025-06-17 NOTE — PROGRESS NOTES
[facility] Progress Note    Date: 2025    Name: Lindsay Schuler    MRN: [id number]         : 1970    Ms. Schuler arrived ambulatory and in no distress for lupron Regimen.  Assessment was completed, no acute issues at this time, no new complaints voiced.  Labs drawn peripherally and sent for processing.     Criteria met for today's treatment.          No data to display                 Ms. Schuler's vitals were reviewed.  Vitals:    25 0715   BP: 106/67   Pulse: 61   Resp: 16   Temp: 97.2 °F (36.2 °C)   SpO2: 97%       Lab results were obtained and reviewed.  Recent Results (from the past 12 hours)   Comprehensive Metabolic Panel    Collection Time: 25  7:35 AM   Result Value Ref Range    Sodium 138 136 - 145 mmol/L    Potassium 4.3 3.5 - 5.1 mmol/L    Chloride 108 97 - 108 mmol/L    CO2 24 21 - 32 mmol/L    Anion Gap 6 2 - 12 mmol/L    Glucose 99 65 - 100 mg/dL    BUN 20 6 - 20 MG/DL    Creatinine 0.82 0.55 - 1.02 MG/DL    BUN/Creatinine Ratio 24 (H) 12 - 20      Est, Glom Filt Rate 86 >60 ml/min/1.73m2    Calcium 8.6 8.5 - 10.1 MG/DL    Total Bilirubin 0.3 0.2 - 1.0 MG/DL    ALT 48 12 - 78 U/L    AST 38 (H) 15 - 37 U/L    Alk Phosphatase 66 45 - 117 U/L    Total Protein 7.1 6.4 - 8.2 g/dL    Albumin 3.7 3.5 - 5.0 g/dL    Globulin 3.4 2.0 - 4.0 g/dL    Albumin/Globulin Ratio 1.1 1.1 - 2.2     CBC with Auto Differential    Collection Time: 25  7:35 AM   Result Value Ref Range    WBC 3.4 (L) 3.6 - 11.0 K/uL    RBC 4.17 3.80 - 5.20 M/uL    Hemoglobin 13.9 11.5 - 16.0 g/dL    Hematocrit 39.6 35.0 - 47.0 %    MCV 95.0 80.0 - 99.0 FL    MCH 33.3 26.0 - 34.0 PG    MCHC 35.1 30.0 - 36.5 g/dL    RDW 11.5 11.5 - 14.5 %    Platelets 241 150 - 400 K/uL    MPV 9.6 8.9 - 12.9 FL    Nucleated RBCs 0.0 0  WBC    nRBC 0.00 0.00 - 0.01 K/uL    Neutrophils % 34.1 32.0 - 75.0 %    Lymphocytes % 51.0 (H) 12.0 - 49.0 %    Monocytes % 9.6 5.0 - 13.0 %    Eosinophils % 3.8 0.0 - 7.0 %    Basophils % 1.2 (H)

## 2025-07-03 DIAGNOSIS — C50.112 MALIGNANT NEOPLASM OF CENTRAL PORTION OF LEFT BREAST IN FEMALE, ESTROGEN RECEPTOR POSITIVE (HCC): Primary | ICD-10-CM

## 2025-07-03 DIAGNOSIS — Z17.0 MALIGNANT NEOPLASM OF CENTRAL PORTION OF LEFT BREAST IN FEMALE, ESTROGEN RECEPTOR POSITIVE (HCC): Primary | ICD-10-CM

## 2025-07-08 RX ORDER — HYDROXYZINE HYDROCHLORIDE 25 MG/1
25 TABLET, FILM COATED ORAL 2 TIMES DAILY PRN
Qty: 180 TABLET | Refills: 1 | Status: SHIPPED | OUTPATIENT
Start: 2025-07-08

## 2025-07-14 ENCOUNTER — APPOINTMENT (OUTPATIENT)
Facility: HOSPITAL | Age: 55
End: 2025-07-14
Payer: COMMERCIAL

## 2025-07-15 ENCOUNTER — OFFICE VISIT (OUTPATIENT)
Age: 55
End: 2025-07-15

## 2025-07-15 ENCOUNTER — HOSPITAL ENCOUNTER (OUTPATIENT)
Facility: HOSPITAL | Age: 55
Setting detail: INFUSION SERIES
Discharge: HOME | End: 2025-07-15
Payer: COMMERCIAL

## 2025-07-15 VITALS
DIASTOLIC BLOOD PRESSURE: 60 MMHG | WEIGHT: 111.8 LBS | TEMPERATURE: 97.1 F | OXYGEN SATURATION: 100 % | HEART RATE: 58 BPM | SYSTOLIC BLOOD PRESSURE: 130 MMHG | RESPIRATION RATE: 16 BRPM | BODY MASS INDEX: 23.47 KG/M2 | HEIGHT: 58 IN

## 2025-07-15 VITALS
SYSTOLIC BLOOD PRESSURE: 130 MMHG | WEIGHT: 111 LBS | BODY MASS INDEX: 23.3 KG/M2 | HEART RATE: 58 BPM | RESPIRATION RATE: 16 BRPM | DIASTOLIC BLOOD PRESSURE: 60 MMHG | TEMPERATURE: 97.1 F | HEIGHT: 58 IN | OXYGEN SATURATION: 100 %

## 2025-07-15 DIAGNOSIS — Z17.0 MALIGNANT NEOPLASM OF CENTRAL PORTION OF LEFT BREAST IN FEMALE, ESTROGEN RECEPTOR POSITIVE (HCC): Primary | ICD-10-CM

## 2025-07-15 DIAGNOSIS — C79.51 CARCINOMA OF BREAST METASTATIC TO BONE, UNSPECIFIED LATERALITY (HCC): ICD-10-CM

## 2025-07-15 DIAGNOSIS — R73.09 ELEVATED GLUCOSE LEVEL: Primary | ICD-10-CM

## 2025-07-15 DIAGNOSIS — Z51.81 ENCOUNTER FOR THERAPEUTIC DRUG LEVEL MONITORING: ICD-10-CM

## 2025-07-15 DIAGNOSIS — C50.112 MALIGNANT NEOPLASM OF CENTRAL PORTION OF LEFT BREAST IN FEMALE, ESTROGEN RECEPTOR POSITIVE (HCC): Primary | ICD-10-CM

## 2025-07-15 DIAGNOSIS — C50.919 CARCINOMA OF BREAST METASTATIC TO BONE, UNSPECIFIED LATERALITY (HCC): ICD-10-CM

## 2025-07-15 DIAGNOSIS — C50.919 MALIGNANT NEOPLASM OF BREAST, STAGE 1, UNSPECIFIED ESTROGEN RECEPTOR STATUS, UNSPECIFIED LATERALITY (HCC): ICD-10-CM

## 2025-07-15 LAB
ALBUMIN SERPL-MCNC: 3.6 G/DL (ref 3.5–5)
ALBUMIN/GLOB SERPL: 1 (ref 1.1–2.2)
ALP SERPL-CCNC: 52 U/L (ref 45–117)
ALT SERPL-CCNC: 32 U/L (ref 12–78)
ANION GAP SERPL CALC-SCNC: 4 MMOL/L (ref 2–12)
AST SERPL-CCNC: 26 U/L (ref 15–37)
BASOPHILS # BLD: 0.05 K/UL (ref 0–0.1)
BASOPHILS NFR BLD: 1.5 % (ref 0–1)
BILIRUB SERPL-MCNC: 0.3 MG/DL (ref 0.2–1)
BUN SERPL-MCNC: 23 MG/DL (ref 6–20)
BUN/CREAT SERPL: 27 (ref 12–20)
CALCIUM SERPL-MCNC: 9 MG/DL (ref 8.5–10.1)
CHLORIDE SERPL-SCNC: 107 MMOL/L (ref 97–108)
CO2 SERPL-SCNC: 27 MMOL/L (ref 21–32)
CREAT SERPL-MCNC: 0.84 MG/DL (ref 0.55–1.02)
DIFFERENTIAL METHOD BLD: ABNORMAL
EOSINOPHIL # BLD: 0.1 K/UL (ref 0–0.4)
EOSINOPHIL NFR BLD: 2.9 % (ref 0–7)
ERYTHROCYTE [DISTWIDTH] IN BLOOD BY AUTOMATED COUNT: 11.9 % (ref 11.5–14.5)
GLOBULIN SER CALC-MCNC: 3.5 G/DL (ref 2–4)
GLUCOSE SERPL-MCNC: 103 MG/DL (ref 65–100)
HCT VFR BLD AUTO: 39.2 % (ref 35–47)
HGB BLD-MCNC: 13.7 G/DL (ref 11.5–16)
IMM GRANULOCYTES # BLD AUTO: 0.01 K/UL (ref 0–0.04)
IMM GRANULOCYTES NFR BLD AUTO: 0.3 % (ref 0–0.5)
LYMPHOCYTES # BLD: 1.58 K/UL (ref 0.8–3.5)
LYMPHOCYTES NFR BLD: 46.3 % (ref 12–49)
MCH RBC QN AUTO: 33.3 PG (ref 26–34)
MCHC RBC AUTO-ENTMCNC: 34.9 G/DL (ref 30–36.5)
MCV RBC AUTO: 95.1 FL (ref 80–99)
MONOCYTES # BLD: 0.36 K/UL (ref 0–1)
MONOCYTES NFR BLD: 10.6 % (ref 5–13)
NEUTS SEG # BLD: 1.31 K/UL (ref 1.8–8)
NEUTS SEG NFR BLD: 38.4 % (ref 32–75)
NRBC # BLD: 0 K/UL (ref 0–0.01)
NRBC BLD-RTO: 0 PER 100 WBC
PLATELET # BLD AUTO: 261 K/UL (ref 150–400)
PMV BLD AUTO: 9 FL (ref 8.9–12.9)
POTASSIUM SERPL-SCNC: 4.4 MMOL/L (ref 3.5–5.1)
PROT SERPL-MCNC: 7.1 G/DL (ref 6.4–8.2)
RBC # BLD AUTO: 4.12 M/UL (ref 3.8–5.2)
SODIUM SERPL-SCNC: 138 MMOL/L (ref 136–145)
WBC # BLD AUTO: 3.4 K/UL (ref 3.6–11)

## 2025-07-15 PROCEDURE — 36415 COLL VENOUS BLD VENIPUNCTURE: CPT

## 2025-07-15 PROCEDURE — 80053 COMPREHEN METABOLIC PANEL: CPT

## 2025-07-15 PROCEDURE — 96402 CHEMO HORMON ANTINEOPL SQ/IM: CPT

## 2025-07-15 PROCEDURE — 6360000002 HC RX W HCPCS: Performed by: INTERNAL MEDICINE

## 2025-07-15 PROCEDURE — 85025 COMPLETE CBC W/AUTO DIFF WBC: CPT

## 2025-07-15 RX ORDER — EPINEPHRINE 1 MG/ML
0.3 INJECTION, SOLUTION INTRAMUSCULAR; SUBCUTANEOUS PRN
OUTPATIENT
Start: 2025-08-12

## 2025-07-15 RX ORDER — SODIUM CHLORIDE 9 MG/ML
INJECTION, SOLUTION INTRAVENOUS CONTINUOUS
OUTPATIENT
Start: 2025-08-12

## 2025-07-15 RX ORDER — FAMOTIDINE 10 MG/ML
20 INJECTION, SOLUTION INTRAVENOUS
OUTPATIENT
Start: 2025-08-12

## 2025-07-15 RX ORDER — HYDROCORTISONE SODIUM SUCCINATE 100 MG/2ML
100 INJECTION INTRAMUSCULAR; INTRAVENOUS
OUTPATIENT
Start: 2025-08-12

## 2025-07-15 RX ORDER — ONDANSETRON 2 MG/ML
8 INJECTION INTRAMUSCULAR; INTRAVENOUS
OUTPATIENT
Start: 2025-08-12

## 2025-07-15 RX ORDER — ACETAMINOPHEN 325 MG/1
650 TABLET ORAL
OUTPATIENT
Start: 2025-08-12

## 2025-07-15 RX ORDER — DIPHENHYDRAMINE HYDROCHLORIDE 50 MG/ML
50 INJECTION, SOLUTION INTRAMUSCULAR; INTRAVENOUS
OUTPATIENT
Start: 2025-08-12

## 2025-07-15 RX ORDER — ALBUTEROL SULFATE 90 UG/1
4 INHALANT RESPIRATORY (INHALATION) PRN
OUTPATIENT
Start: 2025-08-12

## 2025-07-15 RX ADMIN — LEUPROLIDE ACETATE 3.75 MG: KIT at 07:47

## 2025-07-15 ASSESSMENT — PATIENT HEALTH QUESTIONNAIRE - PHQ9
SUM OF ALL RESPONSES TO PHQ QUESTIONS 1-9: 1
2. FEELING DOWN, DEPRESSED OR HOPELESS: SEVERAL DAYS
1. LITTLE INTEREST OR PLEASURE IN DOING THINGS: NOT AT ALL

## 2025-07-15 ASSESSMENT — PAIN DESCRIPTION - LOCATION: LOCATION: WRIST

## 2025-07-15 ASSESSMENT — PAIN SCALES - GENERAL: PAINLEVEL_OUTOF10: 0

## 2025-07-15 ASSESSMENT — PAIN DESCRIPTION - ORIENTATION: ORIENTATION: RIGHT

## 2025-07-15 ASSESSMENT — PAIN DESCRIPTION - PAIN TYPE: TYPE: CHRONIC PAIN

## 2025-07-15 NOTE — PROGRESS NOTES
Outpatient Infusion Center Progress Note        Date: 07/15/25    Name: Lindsay Schuler    MRN: [id number]         : 1970    MD: Brandon Saldana MD       Ms. Schuler admitted to [facility] for Lupron ambulatory in stable condition. Assessment completed, no acute issues at this time. No new concerns voiced. Labs drawn peripherally and sent for processing.        Vitals:    07/15/25 0736   BP: 130/60   Pulse: 58   Resp: 16   Temp: 97.1 °F (36.2 °C)   TempSrc: Temporal   SpO2: 100%   Weight: 50.7 kg (111 lb 12.8 oz)   Height: 1.473 m (4' 10\")               Medications:  MEDICATIONS GIVEN:  Medications Administered         leuprolide (LUPRON) injection 3.75 mg Admin Date  07/15/2025 Action  Given Dose  3.75 mg Route  IntraMUSCular Documented By  Chas Knott, RN          Administered RGM          Pt tolerated treatment well, no adverse reactions noted/ D/c home ambulatory in no distress. Patient is aware of next appointment on 2025 @ 0730 at the [place] location.        Future Appointments:  Future Appointments   Date Time Provider Department Center   7/15/2025  8:30 AM Brandon Saldana MD [facility] BS AMB   2025  7:30 AM SS FASTTRACK 1 [facility] IN[facility]   2025  7:30 AM [facility]   2025 10:00 AM [facility] CT 2 SFMRCT [facility]   2025 10:30 AM [facility] NM RM 2 SFMRNM [facility]   2025  7:30 AM SS FASTTRACK 1 [facility]   2025  1:30 PM SS FASTTRACK 1 [facility]

## 2025-07-15 NOTE — PROGRESS NOTES
Lindsay Schuler is a 55 y.o. female is here today for a follow up.    1. Have you been to the ER, urgent care clinic since your last visit?  Hospitalized since your last visit?No    2. Have you seen or consulted any other health care providers outside of the [facility] since your last visit?  Include any pap smears or colon screening. No       7/15/2025  7:36 AM   Vitals    SYSTOLIC 130    DIASTOLIC 60    BP Site    Patient Position    BP Cuff Size    Pulse 58    Temp 97.1 °F (36.2 °C)    Respirations 16    SpO2 100 %    Weight - Scale 111 lb 12.8 oz    Height 4' 10\"    Body Mass Index 23.37 kg/m2    Pain Score    Pain Loc    Pain Level 0

## 2025-07-15 NOTE — PROGRESS NOTES
[facility] [place] at [facility]  [address], [address] [place] [address]  W: [phone]  F: [phone]      Reason for Visit:   Lindsay Schuler is a 55 y.o. female who is seen in follow up for breast cancer.    Breast Surgeon: Dr. Veras    Treatment History:   9/3/15 left breast lumpectomy:  IDC 0.3 cm, gr 1-2, ER + at 95%, DE + at 30%, HER 2 negative at IHC 1+; DCIS present, solid, papillary, gr 2; pT1a pNx cM0  10/7/15  left breast core bx:  IDC, gr 2, ER + at 100%, DE + at 50%, HER 2 negative at IHC 0; DCIS gr 2-3, ER + at 100%, DE + at 20%  Mammaprint shows high risk luminal B 2015  12/1/15:  SLN bx:  0/6 LN  [facility]:  NBN pathologic mutation; BRCA2 VUS  12/15/15:  left breast mastectomy:  IDC, 0.4 cm, gr 2, HER 2 negative at IHC 0; extensive DCIS 1.4 cm, solid, gr 3, no LVI, 0/6 LN, pT1a pN0 cM0  Declined chemotherapy and endocrine therapy  5/17/23 left breast mass core bx:  IDC, gr 1-2, 1.3 cm, ER + at 95%, DE < 1%, HER 2 negative at IHC 0, ki67 5%; DCIS ER + at 95%, DE negative; + skeletal muscle involvement  Mammaprint shows high risk luminal B (-0.273) (2023)  6/30/23 right clavicle bone lesion:  metastatic breast cancer, ER +, weakly 1-10%, DE negative, HER 2 negative at IHC 0  7/17/24- lupron  Zometa 8/14/23- current  Letrozole 8/15/23- 5/29/24 (joint pain)  Ribociclib 400 mg (pt preference) 8/15/23- current  Held 3/25/25 due to neutropenia  Anastrozole 6/11/24 - 12/2/24 (joint pain) restarted 1/27/25 - current  Exemestane 12/2/24 - 1/27/25    History of Present Illness:   In 2015, abnormal discharge led to the original pathology.  Mamogram 12/2014 was negative.  Surgical bx originally.  She states she noticed an abnormality in her L breast in 2016, MRI negative that year, MRI shows abnormality in 2018, US called it fat necrosis.  Mass grew this year, biopsy above.    Interval history: Patient presents today for follow up on ribociclib/anastrozole. Reports

## 2025-08-07 DIAGNOSIS — C50.919 MALIGNANT NEOPLASM OF BREAST, STAGE 1, UNSPECIFIED ESTROGEN RECEPTOR STATUS, UNSPECIFIED LATERALITY (HCC): Primary | ICD-10-CM

## 2025-08-07 RX ORDER — ZOLEDRONIC ACID 0.04 MG/ML
4 INJECTION, SOLUTION INTRAVENOUS ONCE
OUTPATIENT
Start: 2025-08-12 | End: 2025-08-12

## 2025-08-07 RX ORDER — SODIUM CHLORIDE 9 MG/ML
5-250 INJECTION, SOLUTION INTRAVENOUS PRN
OUTPATIENT
Start: 2025-08-12

## 2025-08-11 ENCOUNTER — APPOINTMENT (OUTPATIENT)
Facility: HOSPITAL | Age: 55
End: 2025-08-11
Payer: COMMERCIAL

## 2025-08-11 DIAGNOSIS — C50.919 MALIGNANT NEOPLASM OF BREAST, STAGE 1, UNSPECIFIED ESTROGEN RECEPTOR STATUS, UNSPECIFIED LATERALITY (HCC): ICD-10-CM

## 2025-08-11 DIAGNOSIS — R73.09 ELEVATED GLUCOSE LEVEL: Primary | ICD-10-CM

## 2025-08-11 RX ORDER — EPINEPHRINE 1 MG/ML
0.3 INJECTION, SOLUTION, CONCENTRATE INTRAVENOUS PRN
OUTPATIENT
Start: 2025-08-12

## 2025-08-11 RX ORDER — ONDANSETRON 2 MG/ML
8 INJECTION INTRAMUSCULAR; INTRAVENOUS
OUTPATIENT
Start: 2025-08-12

## 2025-08-11 RX ORDER — SODIUM CHLORIDE 9 MG/ML
INJECTION, SOLUTION INTRAVENOUS CONTINUOUS
OUTPATIENT
Start: 2025-08-12

## 2025-08-11 RX ORDER — DIPHENHYDRAMINE HYDROCHLORIDE 50 MG/ML
50 INJECTION, SOLUTION INTRAMUSCULAR; INTRAVENOUS
OUTPATIENT
Start: 2025-08-12

## 2025-08-11 RX ORDER — HYDROCORTISONE SODIUM SUCCINATE 100 MG/2ML
100 INJECTION INTRAMUSCULAR; INTRAVENOUS
OUTPATIENT
Start: 2025-08-12

## 2025-08-11 RX ORDER — ACETAMINOPHEN 325 MG/1
650 TABLET ORAL
OUTPATIENT
Start: 2025-08-12

## 2025-08-11 RX ORDER — ALBUTEROL SULFATE 90 UG/1
4 INHALANT RESPIRATORY (INHALATION) PRN
OUTPATIENT
Start: 2025-08-12

## 2025-08-12 ENCOUNTER — OFFICE VISIT (OUTPATIENT)
Age: 55
End: 2025-08-12
Payer: COMMERCIAL

## 2025-08-12 ENCOUNTER — HOSPITAL ENCOUNTER (OUTPATIENT)
Facility: HOSPITAL | Age: 55
Setting detail: INFUSION SERIES
Discharge: HOME | End: 2025-08-12
Payer: COMMERCIAL

## 2025-08-12 VITALS
DIASTOLIC BLOOD PRESSURE: 73 MMHG | BODY MASS INDEX: 23.3 KG/M2 | OXYGEN SATURATION: 99 % | RESPIRATION RATE: 18 BRPM | WEIGHT: 111 LBS | HEART RATE: 57 BPM | TEMPERATURE: 97.7 F | HEIGHT: 58 IN | SYSTOLIC BLOOD PRESSURE: 112 MMHG

## 2025-08-12 VITALS
OXYGEN SATURATION: 98 % | BODY MASS INDEX: 23.34 KG/M2 | RESPIRATION RATE: 18 BRPM | DIASTOLIC BLOOD PRESSURE: 73 MMHG | SYSTOLIC BLOOD PRESSURE: 112 MMHG | HEIGHT: 58 IN | HEART RATE: 57 BPM | TEMPERATURE: 97.7 F | WEIGHT: 111.2 LBS

## 2025-08-12 DIAGNOSIS — R73.09 ELEVATED GLUCOSE LEVEL: ICD-10-CM

## 2025-08-12 DIAGNOSIS — R30.0 DYSURIA: ICD-10-CM

## 2025-08-12 DIAGNOSIS — C50.112 MALIGNANT NEOPLASM OF CENTRAL PORTION OF LEFT BREAST IN FEMALE, ESTROGEN RECEPTOR POSITIVE (HCC): Primary | ICD-10-CM

## 2025-08-12 DIAGNOSIS — Z17.0 MALIGNANT NEOPLASM OF CENTRAL PORTION OF LEFT BREAST IN FEMALE, ESTROGEN RECEPTOR POSITIVE (HCC): Primary | ICD-10-CM

## 2025-08-12 DIAGNOSIS — C50.919 MALIGNANT NEOPLASM OF BREAST, STAGE 1, UNSPECIFIED ESTROGEN RECEPTOR STATUS, UNSPECIFIED LATERALITY (HCC): ICD-10-CM

## 2025-08-12 LAB
ALBUMIN SERPL-MCNC: 3.7 G/DL (ref 3.5–5.2)
ALBUMIN/GLOB SERPL: 1.2 (ref 1.1–2.2)
ALP SERPL-CCNC: 49 U/L (ref 35–104)
ALT SERPL-CCNC: 18 U/L (ref 10–35)
ANION GAP SERPL CALC-SCNC: 9 MMOL/L (ref 2–14)
APPEARANCE UR: ABNORMAL
AST SERPL-CCNC: 22 U/L (ref 10–35)
BACTERIA URNS QL MICRO: NEGATIVE /HPF
BASOPHILS # BLD: 0.04 K/UL (ref 0–0.1)
BASOPHILS NFR BLD: 1.2 % (ref 0–1)
BILIRUB SERPL-MCNC: 0.3 MG/DL (ref 0–1.2)
BILIRUB UR QL: NEGATIVE
BUN SERPL-MCNC: 23 MG/DL (ref 6–20)
BUN/CREAT SERPL: 23 (ref 12–20)
CALCIUM SERPL-MCNC: 9.3 MG/DL (ref 8.6–10)
CHLORIDE SERPL-SCNC: 103 MMOL/L (ref 98–107)
CO2 SERPL-SCNC: 25 MMOL/L (ref 20–29)
COLOR UR: ABNORMAL
CREAT SERPL-MCNC: 1 MG/DL (ref 0.6–1)
DIFFERENTIAL METHOD BLD: ABNORMAL
EOSINOPHIL # BLD: 0.16 K/UL (ref 0–0.4)
EOSINOPHIL NFR BLD: 4.8 % (ref 0–7)
EPITH CASTS URNS QL MICRO: ABNORMAL /LPF
ERYTHROCYTE [DISTWIDTH] IN BLOOD BY AUTOMATED COUNT: 12.4 % (ref 11.5–14.5)
GLOBULIN SER CALC-MCNC: 3.1 G/DL (ref 2–4)
GLUCOSE SERPL-MCNC: 96 MG/DL (ref 65–100)
GLUCOSE UR STRIP.AUTO-MCNC: NEGATIVE MG/DL
HCT VFR BLD AUTO: 38.3 % (ref 35–47)
HGB BLD-MCNC: 13.2 G/DL (ref 11.5–16)
HGB UR QL STRIP: NEGATIVE
HYALINE CASTS URNS QL MICRO: ABNORMAL /LPF (ref 0–2)
IMM GRANULOCYTES # BLD AUTO: 0.01 K/UL (ref 0–0.04)
IMM GRANULOCYTES NFR BLD AUTO: 0.3 % (ref 0–0.5)
KETONES UR QL STRIP.AUTO: NEGATIVE MG/DL
LEUKOCYTE ESTERASE UR QL STRIP.AUTO: ABNORMAL
LYMPHOCYTES # BLD: 1.44 K/UL (ref 0.8–3.5)
LYMPHOCYTES NFR BLD: 43.6 % (ref 12–49)
MCH RBC QN AUTO: 32.5 PG (ref 26–34)
MCHC RBC AUTO-ENTMCNC: 34.5 G/DL (ref 30–36.5)
MCV RBC AUTO: 94.3 FL (ref 80–99)
MONOCYTES # BLD: 0.42 K/UL (ref 0–1)
MONOCYTES NFR BLD: 12.7 % (ref 5–13)
NEUTS SEG # BLD: 1.23 K/UL (ref 1.8–8)
NEUTS SEG NFR BLD: 37.4 % (ref 32–75)
NITRITE UR QL STRIP.AUTO: NEGATIVE
NRBC # BLD: 0 K/UL (ref 0–0.01)
NRBC BLD-RTO: 0 PER 100 WBC
PH UR STRIP: 7.5 (ref 5–8)
PLATELET # BLD AUTO: 252 K/UL (ref 150–400)
PMV BLD AUTO: 9.1 FL (ref 8.9–12.9)
POTASSIUM SERPL-SCNC: 4.1 MMOL/L (ref 3.5–5.1)
PROT SERPL-MCNC: 6.8 G/DL (ref 6.4–8.3)
PROT UR STRIP-MCNC: NEGATIVE MG/DL
RBC # BLD AUTO: 4.06 M/UL (ref 3.8–5.2)
RBC #/AREA URNS HPF: ABNORMAL /HPF (ref 0–5)
SODIUM SERPL-SCNC: 137 MMOL/L (ref 136–145)
SP GR UR REFRACTOMETRY: 1.02 (ref 1–1.03)
URINE CULTURE IF INDICATED: ABNORMAL
UROBILINOGEN UR QL STRIP.AUTO: 0.2 EU/DL (ref 0.2–1)
WBC # BLD AUTO: 3.3 K/UL (ref 3.6–11)
WBC URNS QL MICRO: ABNORMAL /HPF (ref 0–4)

## 2025-08-12 PROCEDURE — 99215 OFFICE O/P EST HI 40 MIN: CPT | Performed by: INTERNAL MEDICINE

## 2025-08-12 PROCEDURE — 36415 COLL VENOUS BLD VENIPUNCTURE: CPT

## 2025-08-12 PROCEDURE — 81001 URINALYSIS AUTO W/SCOPE: CPT

## 2025-08-12 PROCEDURE — 85025 COMPLETE CBC W/AUTO DIFF WBC: CPT

## 2025-08-12 PROCEDURE — 80053 COMPREHEN METABOLIC PANEL: CPT

## 2025-08-12 ASSESSMENT — PAIN SCALES - GENERAL: PAINLEVEL_OUTOF10: 0

## 2025-09-04 RX ORDER — IOPAMIDOL 755 MG/ML
100 INJECTION, SOLUTION INTRAVASCULAR
Status: COMPLETED | OUTPATIENT
Start: 2025-09-04 | End: 2025-09-05

## 2025-09-05 ENCOUNTER — HOSPITAL ENCOUNTER (OUTPATIENT)
Facility: HOSPITAL | Age: 55
End: 2025-09-05
Payer: COMMERCIAL

## 2025-09-05 ENCOUNTER — HOSPITAL ENCOUNTER (OUTPATIENT)
Facility: HOSPITAL | Age: 55
Discharge: HOME | End: 2025-09-05
Payer: COMMERCIAL

## 2025-09-05 DIAGNOSIS — C50.112 MALIGNANT NEOPLASM OF CENTRAL PORTION OF LEFT BREAST IN FEMALE, ESTROGEN RECEPTOR POSITIVE (HCC): ICD-10-CM

## 2025-09-05 DIAGNOSIS — Z17.0 MALIGNANT NEOPLASM OF CENTRAL PORTION OF LEFT BREAST IN FEMALE, ESTROGEN RECEPTOR POSITIVE (HCC): ICD-10-CM

## 2025-09-05 PROCEDURE — A9503 TC99M MEDRONATE: HCPCS | Performed by: NURSE PRACTITIONER

## 2025-09-05 PROCEDURE — 74177 CT ABD & PELVIS W/CONTRAST: CPT

## 2025-09-05 PROCEDURE — 3430000000 HC RX DIAGNOSTIC RADIOPHARMACEUTICAL: Performed by: NURSE PRACTITIONER

## 2025-09-05 PROCEDURE — 78306 BONE IMAGING WHOLE BODY: CPT | Performed by: NURSE PRACTITIONER

## 2025-09-05 PROCEDURE — 6360000004 HC RX CONTRAST MEDICATION: Performed by: NURSE PRACTITIONER

## 2025-09-05 RX ORDER — TC 99M MEDRONATE 20 MG/10ML
25 INJECTION, POWDER, LYOPHILIZED, FOR SOLUTION INTRAVENOUS
Status: COMPLETED | OUTPATIENT
Start: 2025-09-05 | End: 2025-09-05

## 2025-09-05 RX ADMIN — IOPAMIDOL 80 ML: 755 INJECTION, SOLUTION INTRAVENOUS at 07:45

## 2025-09-05 RX ADMIN — TC 99M MEDRONATE 25 MILLICURIE: 20 INJECTION, POWDER, LYOPHILIZED, FOR SOLUTION INTRAVENOUS at 07:38
